# Patient Record
Sex: MALE | Race: WHITE | NOT HISPANIC OR LATINO | Employment: OTHER | URBAN - METROPOLITAN AREA
[De-identification: names, ages, dates, MRNs, and addresses within clinical notes are randomized per-mention and may not be internally consistent; named-entity substitution may affect disease eponyms.]

---

## 2021-08-11 ENCOUNTER — OFFICE VISIT (OUTPATIENT)
Dept: ENDOCRINOLOGY | Facility: CLINIC | Age: 66
End: 2021-08-11

## 2021-08-11 ENCOUNTER — LAB (OUTPATIENT)
Dept: LAB | Facility: HOSPITAL | Age: 66
End: 2021-08-11

## 2021-08-11 VITALS
DIASTOLIC BLOOD PRESSURE: 84 MMHG | OXYGEN SATURATION: 98 % | HEIGHT: 67 IN | BODY MASS INDEX: 33.9 KG/M2 | WEIGHT: 216 LBS | SYSTOLIC BLOOD PRESSURE: 132 MMHG | HEART RATE: 56 BPM

## 2021-08-11 DIAGNOSIS — E83.52 HYPERCALCEMIA: Primary | ICD-10-CM

## 2021-08-11 LAB
ALBUMIN SERPL-MCNC: 4.7 G/DL (ref 3.5–5.2)
ANION GAP SERPL CALCULATED.3IONS-SCNC: 10.8 MMOL/L (ref 5–15)
BUN SERPL-MCNC: 22 MG/DL (ref 8–23)
BUN/CREAT SERPL: 27.8 (ref 7–25)
CALCIUM SPEC-SCNC: 11 MG/DL (ref 8.6–10.5)
CHLORIDE SERPL-SCNC: 105 MMOL/L (ref 98–107)
CO2 SERPL-SCNC: 26.2 MMOL/L (ref 22–29)
CREAT SERPL-MCNC: 0.79 MG/DL (ref 0.76–1.27)
GFR SERPL CREATININE-BSD FRML MDRD: 98 ML/MIN/1.73
GLUCOSE SERPL-MCNC: 94 MG/DL (ref 65–99)
PHOSPHATE SERPL-MCNC: 2.7 MG/DL (ref 2.5–4.5)
POTASSIUM SERPL-SCNC: 5.2 MMOL/L (ref 3.5–5.2)
PTH-INTACT SERPL-MCNC: 45.8 PG/ML (ref 15–65)
SODIUM SERPL-SCNC: 142 MMOL/L (ref 136–145)

## 2021-08-11 PROCEDURE — 82652 VIT D 1 25-DIHYDROXY: CPT | Performed by: INTERNAL MEDICINE

## 2021-08-11 PROCEDURE — 83970 ASSAY OF PARATHORMONE: CPT | Performed by: INTERNAL MEDICINE

## 2021-08-11 PROCEDURE — 80069 RENAL FUNCTION PANEL: CPT | Performed by: INTERNAL MEDICINE

## 2021-08-11 PROCEDURE — 82306 VITAMIN D 25 HYDROXY: CPT | Performed by: INTERNAL MEDICINE

## 2021-08-11 PROCEDURE — 99203 OFFICE O/P NEW LOW 30 MIN: CPT | Performed by: INTERNAL MEDICINE

## 2021-08-11 RX ORDER — PHENOL 1.4 %
AEROSOL, SPRAY (ML) MUCOUS MEMBRANE
COMMUNITY

## 2021-08-11 RX ORDER — SODIUM PHOSPHATE,MONO-DIBASIC 19G-7G/118
ENEMA (ML) RECTAL
COMMUNITY
End: 2021-11-18 | Stop reason: HOSPADM

## 2021-08-11 RX ORDER — CHOLECALCIFEROL (VITAMIN D3) 125 MCG
4000 CAPSULE ORAL DAILY
COMMUNITY

## 2021-08-11 RX ORDER — CETIRIZINE HYDROCHLORIDE 10 MG/1
10 TABLET ORAL DAILY
COMMUNITY

## 2021-08-11 RX ORDER — LISINOPRIL 20 MG/1
20 TABLET ORAL NIGHTLY
COMMUNITY
Start: 2021-05-04

## 2021-08-11 NOTE — PROGRESS NOTES
Chief Complaint   Patient presents with   • Abnormal Lab        New patient who is being seen in consultation regarding hypercalcemia at the request of Mali Scanlon PA-C HPI   Chaz Chung is a 66 y.o. male who presents for evaluation of hypercalcemia.    Patient reports that diagnosis of elevated calcium was made earlier this year.  Patient denies taking calcium supplements or OTC medications containing calcium, such as TUMS.   Patient reports that he is taking vitamin D, 400 international units daily, started a couple of months ago.  Patient reports a history of hypertension but has never been treated with HCTZ or chlorthalidone.  Patient  denies history of kidney dysfunction.  Patient denies history of kidney stones.  Patient denies history of osteopenia or osteoporosis.  He has never had a bone density scan.  Patient denies family history of calcium disorders.  Patient reports reports a high volume of cheese intake.  He estimates that he eats 2 to 3 pounds of cheese per week.    Patient reports energy level is good, denies constipation, myalgias.  He does have some chronic knee pain.  He reports that he is diligent in maintaining hydration.  He denies any acute concerns.    Past Medical History:   Diagnosis Date   • Arthritis    • Hearing loss    • Hypertension      Past Surgical History:   Procedure Laterality Date   • APPENDECTOMY     • HERNIA REPAIR     • KNEE SURGERY     • SPINE SURGERY     • TONSILLECTOMY        Family History   Problem Relation Age of Onset   • Heart disease Mother    • Arthritis Mother    • Heart disease Father    • Heart disease Sister    • Asthma Sister    • No Known Problems Brother    • No Known Problems Sister       Social History     Socioeconomic History   • Marital status:      Spouse name: Not on file   • Number of children: Not on file   • Years of education: Not on file   • Highest education level: Not on file   Tobacco Use   • Smoking status: Never Smoker   •  "Smokeless tobacco: Former User     Types: Chew   Vaping Use   • Vaping Use: Never used   Substance and Sexual Activity   • Alcohol use: Yes     Alcohol/week: 5.0 standard drinks     Types: 4 Cans of beer, 1 Shots of liquor per week   • Drug use: Never   • Sexual activity: Defer      Allergies   Allergen Reactions   • Penicillins Hives      Current Outpatient Medications on File Prior to Visit   Medication Sig Dispense Refill   • cetirizine (zyrTEC) 10 MG tablet Take 10 mg by mouth Daily.     • cholecalciferol (VITAMIN D3) 10 MCG (400 UNIT) tablet Take 400 Units by mouth Daily.     • glucosamine sulfate 500 MG capsule capsule Take  by mouth 3 (Three) Times a Day With Meals.     • lisinopril (PRINIVIL,ZESTRIL) 20 MG tablet Take 20 mg by mouth Daily.     • Melatonin 10 MG tablet Take  by mouth.       No current facility-administered medications on file prior to visit.        Review of Systems   Constitutional: Negative for fatigue, unexpected weight gain and unexpected weight loss.   HENT: Negative for trouble swallowing and voice change.    Eyes: Negative for pain and visual disturbance.   Respiratory: Negative for cough and shortness of breath.    Cardiovascular: Negative for palpitations and leg swelling.   Gastrointestinal: Negative for constipation and diarrhea.   Endocrine: Negative for cold intolerance and heat intolerance.   Musculoskeletal: Positive for arthralgias. Negative for myalgias.   Skin: Negative for dry skin and rash.   Neurological: Negative for tremors and headache.   Psychiatric/Behavioral: Negative for sleep disturbance. The patient is not nervous/anxious.         Vitals:    08/11/21 0838   BP: 132/84   BP Location: Left arm   Patient Position: Sitting   Cuff Size: Adult   Pulse: 56   SpO2: 98%   Weight: 98 kg (216 lb)   Height: 170.2 cm (67\")   Body mass index is 33.83 kg/m².     Physical Exam  Vitals reviewed.   Constitutional:       General: He is not in acute distress.     Appearance: He is " obese.   HENT:      Head: Normocephalic and atraumatic.      Right Ear: Decreased hearing noted.      Left Ear: Decreased hearing noted.      Nose: Nose normal.   Eyes:      General: Lids are normal.      Conjunctiva/sclera: Conjunctivae normal.   Neck:      Thyroid: No thyromegaly or thyroid tenderness.   Cardiovascular:      Rate and Rhythm: Normal rate and regular rhythm.      Heart sounds: No murmur heard.     Pulmonary:      Effort: Pulmonary effort is normal.      Breath sounds: Normal breath sounds and air entry.   Abdominal:      General: Abdomen is protuberant. Bowel sounds are normal.      Palpations: Abdomen is soft.      Tenderness: There is no abdominal tenderness.   Lymphadenopathy:      Head:      Right side of head: No submandibular adenopathy.      Left side of head: No submandibular adenopathy.      Cervical: No cervical adenopathy.   Skin:     General: Skin is warm and dry.      Findings: No rash.   Neurological:      General: No focal deficit present.      Mental Status: He is alert.      Deep Tendon Reflexes: Reflexes are normal and symmetric.   Psychiatric:         Mood and Affect: Mood and affect normal.         Behavior: Behavior is cooperative.        Labs/Imaging  Labs dated 6/10/2020  eGFR 87  Calcium 10.6  Ionized calcium 6.1  TSH 2.57  PTH 41    Labs dated 5/4/2021  Calcium 9.5  Albumin 4.3    Labs dated 10/22/2020  Calcium 10.2  Albumin 4.4  25 hydroxy vitamin D 33.8    Assessment and Plan    Diagnoses and all orders for this visit:    1. Hypercalcemia (Primary)  -Patient initially noted to have mild hypercalcemia on labs in May 2021, repeat persistently elevated in June 2021 with concurrent PTH of 41.  -Vitamin D was normal in October 2020.  Discussed that patient's prior PTH level is inappropriately normal given mild hypercalcemia.  Discussed that this likely represents mild primary hyperparathyroidism.  Discussed evaluation required to confirm hyperparathyroidism.  We will plan to  repeat labs today to determine next steps.  -Reviewed surgical indications for primary hyperparathyroidism, patient has never had bone density evaluation and otherwise would not have an indication for surgical intervention.  Patient reports that he strongly prefers to avoid surgery unless absolutely indicated.  -Reviewed potential symptoms of hypercalcemia, patient not currently symptomatic.  Reviewed potential dangers of an acute rise in serum calcium including cardiac arrhythmias, mental status changes, renal dysfunction.  Discussed the role of hydration in preventing acute rise in serum calcium.  -     PTH, Intact  -     Renal Function Panel  -     Vitamin D 1,25 Dihydroxy  -     Vitamin D 25 Hydroxy      Return in about 4 months (around 12/11/2021). The patient was instructed to contact the clinic with any interval questions or concerns.    Margo Garg MD     Please note that portions of this document were completed using a voice recognition program. Efforts were made to edit the dictations, but occasionally words are mis-transcribed.

## 2021-08-12 LAB — 25(OH)D3 SERPL-MCNC: 48.3 NG/ML (ref 30–100)

## 2021-08-13 LAB — 1,25(OH)2D SERPL-MCNC: 45.5 PG/ML (ref 19.9–79.3)

## 2021-08-31 ENCOUNTER — PREP FOR SURGERY (OUTPATIENT)
Dept: OTHER | Facility: HOSPITAL | Age: 66
End: 2021-08-31

## 2021-08-31 ENCOUNTER — OFFICE VISIT (OUTPATIENT)
Dept: ORTHOPEDIC SURGERY | Facility: CLINIC | Age: 66
End: 2021-08-31

## 2021-08-31 VITALS
BODY MASS INDEX: 33.91 KG/M2 | SYSTOLIC BLOOD PRESSURE: 179 MMHG | WEIGHT: 216.05 LBS | HEART RATE: 65 BPM | HEIGHT: 67 IN | DIASTOLIC BLOOD PRESSURE: 80 MMHG

## 2021-08-31 DIAGNOSIS — M17.0 PRIMARY OSTEOARTHRITIS OF BOTH KNEES: Primary | ICD-10-CM

## 2021-08-31 DIAGNOSIS — M17.12 PRIMARY OSTEOARTHRITIS OF LEFT KNEE: Primary | ICD-10-CM

## 2021-08-31 DIAGNOSIS — M25.562 PAIN IN BOTH KNEES, UNSPECIFIED CHRONICITY: ICD-10-CM

## 2021-08-31 DIAGNOSIS — M25.561 PAIN IN BOTH KNEES, UNSPECIFIED CHRONICITY: ICD-10-CM

## 2021-08-31 PROBLEM — M17.9 OA (OSTEOARTHRITIS) OF KNEE: Status: ACTIVE | Noted: 2021-08-31

## 2021-08-31 PROCEDURE — 99204 OFFICE O/P NEW MOD 45 MIN: CPT | Performed by: ORTHOPAEDIC SURGERY

## 2021-08-31 RX ORDER — OXYCODONE HCL 10 MG/1
10 TABLET, FILM COATED, EXTENDED RELEASE ORAL ONCE
Status: CANCELLED | OUTPATIENT
Start: 2021-08-31 | End: 2021-08-31

## 2021-08-31 RX ORDER — TRANEXAMIC ACID 10 MG/ML
1000 INJECTION, SOLUTION INTRAVENOUS ONCE
Status: CANCELLED | OUTPATIENT
Start: 2021-08-31 | End: 2021-08-31

## 2021-08-31 RX ORDER — ACETAMINOPHEN 500 MG
1000 TABLET ORAL ONCE
Status: CANCELLED | OUTPATIENT
Start: 2021-08-31 | End: 2021-08-31

## 2021-08-31 RX ORDER — CHLORHEXIDINE GLUCONATE 4 G/100ML
SOLUTION TOPICAL DAILY
Qty: 236 ML | Refills: 0 | Status: ON HOLD | OUTPATIENT
Start: 2021-08-31 | End: 2021-11-17

## 2021-08-31 NOTE — PROGRESS NOTES
[facility] [place] [facility] Note        Subjective     Pain of the Left Knee and Pain of the Right Knee      HPI    Chaz Chung is a 66 y.o. male who presents with new problem of: bilateral knee pain.  Onset: atraumatic and gradual in nature. The issue has been ongoing for 8 year(s). Pain is a 5/10 on the pain scale. Pain is described as dull and aching. Associated symptoms include pain, swelling, grinding and giving way/buckling. The pain is worse with walking, standing, climbing stairs, working, leisure and rising from seated position; ice and pain medication and/or NSAID improve the pain. Previous treatments have included: bracing, NSAIDS, physical therapy and viscosupplementation (last injection January 2021 ).    I have reviewed the following portions of the patient's history:History of Present Illness and review of systems.      Patient is here today for evaluation of bilateral knee pain left greater than right.  This is bothered him for many years.  He had a left knee arthroscopy years ago by Dr. Rolo Nicholson in [place].  Patient has been reluctant to undergo arthroplasty because of a staph infection that occurred spontaneously in his spine (epidural abscess?).  Patient has tried injections and physical therapy and anti-inflammatories and bracing without a lot of long-term benefit.  He has significant pain anytime he is on his feet for long periods of time.    Past Medical History:   Diagnosis Date   • Arthritis    • Hearing loss    • Hypertension       Past Surgical History:   Procedure Laterality Date   • APPENDECTOMY     • HERNIA REPAIR     • KNEE SURGERY     • SPINE SURGERY     • TONSILLECTOMY        Family History   Problem Relation Age of Onset   • Heart disease Mother    • Arthritis Mother    • Heart disease Father    • Heart disease Sister    • Asthma Sister    • No Known Problems Brother    • No Known Problems Sister      Social History     Socioeconomic History   • Marital status:      " Spouse name: Not on file   • Number of children: Not on file   • Years of education: Not on file   • Highest education level: Not on file   Tobacco Use   • Smoking status: Never Smoker   • Smokeless tobacco: Former User     Types: Chew   Vaping Use   • Vaping Use: Never used   Substance and Sexual Activity   • Alcohol use: Yes     Alcohol/week: 5.0 standard drinks     Types: 4 Cans of beer, 1 Shots of liquor per week   • Drug use: Never   • Sexual activity: Defer      Current Outpatient Medications on File Prior to Visit   Medication Sig Dispense Refill   • cetirizine (zyrTEC) 10 MG tablet Take 10 mg by mouth Daily.     • cholecalciferol (VITAMIN D3) 10 MCG (400 UNIT) tablet Take 400 Units by mouth Daily.     • glucosamine sulfate 500 MG capsule capsule Take  by mouth 3 (Three) Times a Day With Meals.     • lisinopril (PRINIVIL,ZESTRIL) 20 MG tablet Take 20 mg by mouth Daily.     • Melatonin 10 MG tablet Take  by mouth.       No current facility-administered medications on file prior to visit.      Allergies   Allergen Reactions   • Penicillins Hives   • Sulfa Antibiotics Hives          Review of Systems   Constitutional: Negative.    HENT: Negative.    Eyes: Negative.    Respiratory: Negative.    Cardiovascular: Negative.    Gastrointestinal: Negative.    Endocrine: Negative.    Genitourinary: Negative.    Musculoskeletal: Positive for arthralgias.   Skin: Negative.    Allergic/Immunologic: Negative.    Neurological: Negative.    Hematological: Negative.    Psychiatric/Behavioral: Negative.         I reviewed the patient's chief complaint, history of present illness, review of systems, past medical history, surgical history, family history, social history, medications and allergy list.        Objective      Physical Exam  /80   Pulse 65   Ht 170.2 cm (67.01\")   Wt 98 kg (216 lb 0.8 oz)   BMI 33.83 kg/m²     Body mass index is 33.83 kg/m².    General  Mental Status - alert  General Appearance - " cooperative, well groomed, not in acute distress  Orientation - Oriented X3  Build & Nutrition - well developed and well nourished  Posture - normal posture  Gait - normal gait       Ortho Exam  Peripheral Vascular:    Upper Extremity:   Inspection:  Left--no cyanotic nail beds Right--no cyanotic nail beds   Bilateral:  Pink nail beds with brisk capillary refill   Palpation:  Bilateral radial pulse normal    Musculoskeletal:  Global Assessment:  Overall assessment of Lower Extremity Muscle Strength and Tone:  Left quadriceps--5/5   Left hamstrings--5/5       Left tibialis anterior--5/5  Left gastroc-soleus--5/5  Left EHL--5/5    Right quadriceps--5/5  Right hamstrings--5/5  Right tibialis anterior--5/5  Right gastroc soleus--5/5  Right EHL--5/5    Lower Extremity:  Knee/Patella:  No digital clubbing or cyanosis.    Examination of left and right knees reveals:  Normal deep tendon reflexes, coordination, strength, tone, sensation.  No known fractures or deformities.    Inspection and Palpation:    Left knee:  Tenderness:  Over the medial joint line and moderate severity  Effusion:  1+  Crepitus:  Positive  Pulses:  2+  Ecchymosis:  None  Warmth:  None     Right knee:  Tenderness:  Over the medial joint line and moderate severity  Effusion:  1+  Crepitus:  Positive  Pulses:  2+  Ecchymosis:  None  Warmth:  None     ROM:  Right:  Extension:5    Flexion:120  Left:  Extension:5     Flexion:120    Instability:    Left:  Lachman Test:  Negative, Varus stress test negative, Valgus stress test negative  Right:  Lachman Test:  Negative, Varus stress test negative, Valgus stress test negative    Deformities/Malalignments/Discrepancies:    Left:  Genu Varum   Right:  Genu Varum    Functional Testing:  Right:  Te's test:  Negative  Patella grind test:  Positive  Q-angle:  Normal    Left:  Te's test:  Negative  Patella grind test:  Positive  Q-angle:  normal    Imaging/Studies  Imaging Results (Last 24 Hours)      Procedure Component Value Units Date/Time    XR Knee 4+ View Bilateral [[id number]] Resulted: 08/31/21 1004     Updated: 08/31/21 1005    Narrative:      Right knee X-Ray    Indication: Pain    Study:  Upright AP, Skiers, Lateral, and Sunrise views of Right knee(s)    Comparison: None    Findings:    Patient appears to have severe hypertrophic degenerative changes in the   medial compartment.    There are mild early moderate degenerative changes in the lateral   compartment.    There are moderate changes in the patellofemoral compartment.    Patient has overall varus alignment.    Kellgren-Tino ndGndrndanddndend:nd nd2nd Impression:   Severe medial compartment and moderate patellofemoral compartment   degnerative changes of the knee           Left knee X-Ray    Indication: Pain    Study:  Upright AP, Skiers, Lateral, and Sunrise views of Left knee(s)    Comparison: None    Findings:    Patient appears to have severe hypertrophic degenerative changes in the   medial compartment.  There is mild subluxation of the femur on the tibia.  There are moderate to early severe degenerative changes in the lateral   compartment.    There are severe changes in the patellofemoral compartment.    Patient has overall varus alignment.    Kellgren-Tino thGthrthathdtheth:th th5th Impression:   Severe medial and patellofemoral compartment degnerative changes of the   knee.  Overall when compared to the right knee, changes are more   significant.            Assessment    Assessment:  1. Primary osteoarthritis of both knees    2. Pain in both knees, unspecified chronicity        Plan:  1. Continue over-the-counter medication as needed for discomfort  2. Bilateral knee arthritis left greater than right--patient would like to pursue arthroplasty on the left side.  We will do 1 knee at a time.  The risk, benefits, potential hazards of the surgery were discussed at length with him.  He has no personal history of DVT or first-degree relatives with DVT and  therefore we will use a full dose aspirin daily for 6 weeks.  He will do his physical therapy in [place].  We will likely need to do a 23-hour stay.  We stressed the importance of physical therapy.  3. Patient's deformity is fairly significant on the left side.  He is hypertrophic with regards to his arthritic pattern but is not clearly correctable.  He will need a fairly sizable medial dissection and possibly 5 degrees of external rotation on his cut.  I will see him back preoperatively and we will likely get a surgery done sometime in November provided no hospital shutdown secondary to Covid.        Connor Anderson MD  08/31/21  19:02 EDT    Dragon disclaimer:  Much of this encounter note is an electronic transcription/translation of spoken language to printed text. The electronic translation of spoken language may permit erroneous, or at times, nonsensical words or phrases to be inadvertently transcribed; Although I have reviewed the note for such errors, some may still exist.

## 2021-09-16 ENCOUNTER — LAB (OUTPATIENT)
Dept: ENDOCRINOLOGY | Facility: CLINIC | Age: 66
End: 2021-09-16

## 2021-09-16 ENCOUNTER — LAB (OUTPATIENT)
Dept: LAB | Facility: HOSPITAL | Age: 66
End: 2021-09-16

## 2021-09-16 DIAGNOSIS — E83.52 HYPERCALCEMIA: ICD-10-CM

## 2021-09-16 PROCEDURE — 82340 ASSAY OF CALCIUM IN URINE: CPT | Performed by: INTERNAL MEDICINE

## 2021-09-16 PROCEDURE — 81050 URINALYSIS VOLUME MEASURE: CPT | Performed by: INTERNAL MEDICINE

## 2021-09-17 LAB
CALCIUM 24H UR-MCNC: 15.8 MG/DL
CALCIUM 24H UR-MRATE: 347.6 MG/24 HR (ref 100–300)
COLLECT DURATION TIME UR: 24 HRS
SPECIMEN VOL 24H UR: 2200 ML

## 2021-11-11 ENCOUNTER — OFFICE VISIT (OUTPATIENT)
Dept: ORTHOPEDIC SURGERY | Facility: CLINIC | Age: 66
End: 2021-11-11

## 2021-11-11 ENCOUNTER — PRE-ADMISSION TESTING (OUTPATIENT)
Dept: PREADMISSION TESTING | Facility: HOSPITAL | Age: 66
End: 2021-11-11

## 2021-11-11 VITALS
DIASTOLIC BLOOD PRESSURE: 84 MMHG | BODY MASS INDEX: 32.8 KG/M2 | SYSTOLIC BLOOD PRESSURE: 138 MMHG | HEIGHT: 67 IN | WEIGHT: 209 LBS

## 2021-11-11 VITALS — BODY MASS INDEX: 33.49 KG/M2 | WEIGHT: 213.4 LBS | HEIGHT: 67 IN

## 2021-11-11 DIAGNOSIS — M17.12 PRIMARY OSTEOARTHRITIS OF LEFT KNEE: ICD-10-CM

## 2021-11-11 DIAGNOSIS — M17.0 PRIMARY OSTEOARTHRITIS OF BOTH KNEES: Primary | ICD-10-CM

## 2021-11-11 LAB
ANION GAP SERPL CALCULATED.3IONS-SCNC: 12 MMOL/L (ref 5–15)
BASOPHILS # BLD AUTO: 0.05 10*3/MM3 (ref 0–0.2)
BASOPHILS NFR BLD AUTO: 0.6 % (ref 0–1.5)
BUN SERPL-MCNC: 26 MG/DL (ref 8–23)
BUN/CREAT SERPL: 31 (ref 7–25)
CALCIUM SPEC-SCNC: 11.1 MG/DL (ref 8.6–10.5)
CHLORIDE SERPL-SCNC: 102 MMOL/L (ref 98–107)
CO2 SERPL-SCNC: 24 MMOL/L (ref 22–29)
CREAT SERPL-MCNC: 0.84 MG/DL (ref 0.76–1.27)
CRP SERPL-MCNC: 0.31 MG/DL (ref 0–0.5)
DEPRECATED RDW RBC AUTO: 42.4 FL (ref 37–54)
EOSINOPHIL # BLD AUTO: 0.33 10*3/MM3 (ref 0–0.4)
EOSINOPHIL NFR BLD AUTO: 3.9 % (ref 0.3–6.2)
ERYTHROCYTE [DISTWIDTH] IN BLOOD BY AUTOMATED COUNT: 13 % (ref 12.3–15.4)
ERYTHROCYTE [SEDIMENTATION RATE] IN BLOOD: 10 MM/HR (ref 0–20)
GFR SERPL CREATININE-BSD FRML MDRD: 91 ML/MIN/1.73
GLUCOSE SERPL-MCNC: 112 MG/DL (ref 65–99)
HBA1C MFR BLD: 5.6 % (ref 4.8–5.6)
HCT VFR BLD AUTO: 45.9 % (ref 37.5–51)
HGB BLD-MCNC: 15.2 G/DL (ref 13–17.7)
IMM GRANULOCYTES # BLD AUTO: 0.02 10*3/MM3 (ref 0–0.05)
IMM GRANULOCYTES NFR BLD AUTO: 0.2 % (ref 0–0.5)
LYMPHOCYTES # BLD AUTO: 1.11 10*3/MM3 (ref 0.7–3.1)
LYMPHOCYTES NFR BLD AUTO: 13.2 % (ref 19.6–45.3)
MCH RBC QN AUTO: 30.2 PG (ref 26.6–33)
MCHC RBC AUTO-ENTMCNC: 33.1 G/DL (ref 31.5–35.7)
MCV RBC AUTO: 91.1 FL (ref 79–97)
MONOCYTES # BLD AUTO: 0.47 10*3/MM3 (ref 0.1–0.9)
MONOCYTES NFR BLD AUTO: 5.6 % (ref 5–12)
NEUTROPHILS NFR BLD AUTO: 6.45 10*3/MM3 (ref 1.7–7)
NEUTROPHILS NFR BLD AUTO: 76.5 % (ref 42.7–76)
NRBC BLD AUTO-RTO: 0 /100 WBC (ref 0–0.2)
PLATELET # BLD AUTO: 234 10*3/MM3 (ref 140–450)
PMV BLD AUTO: 10 FL (ref 6–12)
POTASSIUM SERPL-SCNC: 5 MMOL/L (ref 3.5–5.2)
QT INTERVAL: 376 MS
QTC INTERVAL: 381 MS
RBC # BLD AUTO: 5.04 10*6/MM3 (ref 4.14–5.8)
SODIUM SERPL-SCNC: 138 MMOL/L (ref 136–145)
WBC # BLD AUTO: 8.43 10*3/MM3 (ref 3.4–10.8)

## 2021-11-11 PROCEDURE — 85652 RBC SED RATE AUTOMATED: CPT

## 2021-11-11 PROCEDURE — 93005 ELECTROCARDIOGRAM TRACING: CPT

## 2021-11-11 PROCEDURE — 36415 COLL VENOUS BLD VENIPUNCTURE: CPT

## 2021-11-11 PROCEDURE — 80048 BASIC METABOLIC PNL TOTAL CA: CPT

## 2021-11-11 PROCEDURE — 93010 ELECTROCARDIOGRAM REPORT: CPT | Performed by: INTERNAL MEDICINE

## 2021-11-11 PROCEDURE — 83036 HEMOGLOBIN GLYCOSYLATED A1C: CPT

## 2021-11-11 PROCEDURE — S0260 H&P FOR SURGERY: HCPCS | Performed by: ORTHOPAEDIC SURGERY

## 2021-11-11 PROCEDURE — 85025 COMPLETE CBC W/AUTO DIFF WBC: CPT

## 2021-11-11 PROCEDURE — 86140 C-REACTIVE PROTEIN: CPT

## 2021-11-11 ASSESSMENT — KOOS JR
KOOS JR SCORE: 44.905
KOOS JR SCORE: 17

## 2021-11-11 NOTE — H&P (VIEW-ONLY)
"    [facility] Note        Subjective     CC: Follow-up (Primary osteoarthritis of both knees)      HPI    Ashok Chung is a 66 y.o. male.  Patient here today for his preop appointment for left total knee arthroplasty.  Patient is with his wife today.    Overall, patient's symptoms are unchanged from 8/31/2021.  Please see note from 8/31/2021 for complete history.  Patient does have history of a cervical spine infection that occurred spontaneously that has made him a little bit leery.    ROS:    Constiutional:Pt denies fever, chills, nausea, or vomiting.  MSK:as above        Objective      Past Medical History  Past Medical History:   Diagnosis Date   • Arthritis    • Elevated cholesterol    • Hearing loss    • History of COVID-19 02/2021    blood test showed positive antibodies in Feb 2021 (no symptoms)   • History of staph infection 2004    Lost two vertebrae as a result of staph infection   • Hypercalcemia    • Hypertension    • Wears glasses          Physical Exam  /84   Ht 170.2 cm (67.01\")   Wt 94.8 kg (209 lb)   BMI 32.73 kg/m²     Body mass index is 32.73 kg/m².    Patient is well nourished and well developed.        Ortho Exam      Musculoskeletal:  Global Assessment:  Overall assessment of Lower Extremity Muscle Strength and Tone:  Left quadriceps--5/5   Left hamstrings--5/5       Left tibialis anterior--5/5  Left gastroc-soleus--5/5  Left EHL --5/5    Lower Extremity:    Inspection and Palpation:  Left knee:  Tenderness:  Over the medial joint line and moderate severity  Effusion:  1+  Crepitus:  Positive  Pulses:  2+  Ecchymosis:  None  Warmth:  None     ROM:  Left:  Extension: 5     Flexion:120    Instability:    Left:    Lachman Test:  Negative   Varus stress test negative  Valgus stress test negative    Deformities/Malalignments/Discrepancies:    Left:  Genu Varum     Functional Testing:  Te's test:  Negative  Patella grind test:  Positive  Q-angle:  " normal      Imaging/Labs/EMG Reviewed:  Imaging Results (Last 24 Hours)     ** No results found for the last 24 hours. **        We reviewed his laboratory studies from today at the time of this dictation.  Patient's sed rate and CRP are normal.  Hemoglobin A1c is within normal limits.  Hematocrit and platelet count are normal.  Creatinine is normal.    Assessment    Assessment:  1. Primary osteoarthritis of both knees        Plan:  1. Recommend over the counter anti-inflammatories for pain and/or swelling  2. Severe osteoarthritis bilateral knees left greater than right--we had a lengthy discussion with the patient regarding treatment options and alternatives.  At this point, we will continue to plan on 1 to 2 weeks of home health followed by outpatient PT.  We will use [facility] in [place].  He will get a full dose aspirin for DVT prophylaxis for 6 weeks.  He will need vancomycin and will need the entire dose in prior to starting surgery.  We will do the surgery as a 23-hour admit.  Will need likely significant releases given the severity of his varus deformity.      Connor Anderson MD  11/11/21  13:04 EST      Dictated Utilizing Dragon Dictation.

## 2021-11-11 NOTE — PAT
An arrival time for procedure was not given during PAT visit. If patient had any questions or concerns about their arrival time, they were instructed to contact their surgeon/physician.  Additionally, if the patient referred to an arrival time that was acquired from their my chart account, patient was encouraged to verify that time with their surgeon/physician.  NO arrival times given in Pre Admission Testing Department.    Patient to apply Chlorhexadine wipes  to surgical area (as instructed) the night before procedure and the AM of procedure. Wipes provided.    Patient instructed to drink 20 ounces (or until full) of Gatorade and it needs to be completed 1 hour (for Main OR patients) or 2 hours (scheduled  section patients) before given arrival time for procedure (NO RED Gatorade)    Patient verbalized understanding.    Prescription for Bactroban and Chlorhexidine shower called into patient's pharmacy or [facility] pharmacy by patient's surgeon.  Reinforced with patient to  the prescription from applicable pharmacy if they haven't already.  Verbal and written instructions given regarding proper use of the Bactroban and Chlorhexidine to patient and/or famlily during PAT visit. Patient/family also instructed to complete checklist and return it to Pre-op on the day of surgery.  Patient and/or family verbalized understanding.    Per Anesthesia Request, patient instructed not to take their ACE/ARB medications on the AM of surgery.    Discussed with patient options for receiving total joint replacement education and assessed patient's ability and preference. Joint Replacement Guide given to patient during PAT visit since not received a copy within the last year. Encouraged patient/family to read guide thoroughly and notify PAT staff with any questions or concerns. Handout provided directing patient to links to watch online videos related to joint replacement surgery on the [facility] website. The handout  gives detailed instructions for joining an online joint replacement class through Zoom or phone conference offered on Thursdays. Patient agreed to participate by watching videos online. Patient verbalized understanding of instructions and to complete the online learning tool survey. Encouraged to share information with family and/or . An overview of the joint replacement education was provided during the visit including general perioperative instructions that are routine for all surgical patients (PAT PASS, wipes, directions to pre-op, etc.).    Clean catch urinalysis not indicated because patient denied recent urinary frequency, urinary urgency, burning or pain upon urination, or flank pain. No recent UTIs.    Patient is aware of COVID test appointment at [facility] on 11/15/21.

## 2021-11-11 NOTE — PROGRESS NOTES
"    [facility] [place] [facility] Note        Subjective     CC: Follow-up (Primary osteoarthritis of both knees)      HPI    Ashok Chung is a 66 y.o. male.  Patient here today for his preop appointment for left total knee arthroplasty.  Patient is with his wife today.    Overall, patient's symptoms are unchanged from 8/31/2021.  Please see note from 8/31/2021 for complete history.  Patient does have history of a cervical spine infection that occurred spontaneously that has made him a little bit leery.    ROS:    Constiutional:Pt denies fever, chills, nausea, or vomiting.  MSK:as above        Objective      Past Medical History  Past Medical History:   Diagnosis Date   • Arthritis    • Elevated cholesterol    • Hearing loss    • History of COVID-19 02/2021    blood test showed positive antibodies in Feb 2021 (no symptoms)   • History of staph infection 2004    Lost two vertebrae as a result of staph infection   • Hypercalcemia    • Hypertension    • Wears glasses          Physical Exam  /84   Ht 170.2 cm (67.01\")   Wt 94.8 kg (209 lb)   BMI 32.73 kg/m²     Body mass index is 32.73 kg/m².    Patient is well nourished and well developed.        Ortho Exam      Musculoskeletal:  Global Assessment:  Overall assessment of Lower Extremity Muscle Strength and Tone:  Left quadriceps--5/5   Left hamstrings--5/5       Left tibialis anterior--5/5  Left gastroc-soleus--5/5  Left EHL --5/5    Lower Extremity:    Inspection and Palpation:  Left knee:  Tenderness:  Over the medial joint line and moderate severity  Effusion:  1+  Crepitus:  Positive  Pulses:  2+  Ecchymosis:  None  Warmth:  None     ROM:  Left:  Extension: 5     Flexion:120    Instability:    Left:    Lachman Test:  Negative   Varus stress test negative  Valgus stress test negative    Deformities/Malalignments/Discrepancies:    Left:  Genu Varum     Functional Testing:  Te's test:  Negative  Patella grind test:  Positive  Q-angle:  " normal      Imaging/Labs/EMG Reviewed:  Imaging Results (Last 24 Hours)     ** No results found for the last 24 hours. **        We reviewed his laboratory studies from today at the time of this dictation.  Patient's sed rate and CRP are normal.  Hemoglobin A1c is within normal limits.  Hematocrit and platelet count are normal.  Creatinine is normal.    Assessment    Assessment:  1. Primary osteoarthritis of both knees        Plan:  1. Recommend over the counter anti-inflammatories for pain and/or swelling  2. Severe osteoarthritis bilateral knees left greater than right--we had a lengthy discussion with the patient regarding treatment options and alternatives.  At this point, we will continue to plan on 1 to 2 weeks of home health followed by outpatient PT.  We will use [facility] in [place].  He will get a full dose aspirin for DVT prophylaxis for 6 weeks.  He will need vancomycin and will need the entire dose in prior to starting surgery.  We will do the surgery as a 23-hour admit.  Will need likely significant releases given the severity of his varus deformity.      Connor Anderson MD  11/11/21  13:04 EST      Dictated Utilizing Dragon Dictation.

## 2021-11-12 DIAGNOSIS — M17.0 PRIMARY OSTEOARTHRITIS OF BOTH KNEES: Primary | ICD-10-CM

## 2021-11-15 ENCOUNTER — APPOINTMENT (OUTPATIENT)
Dept: PREADMISSION TESTING | Facility: HOSPITAL | Age: 66
End: 2021-11-15

## 2021-11-15 DIAGNOSIS — M17.0 PRIMARY OSTEOARTHRITIS OF BOTH KNEES: ICD-10-CM

## 2021-11-15 LAB — SARS-COV-2 RNA PNL SPEC NAA+PROBE: NOT DETECTED

## 2021-11-15 PROCEDURE — U0004 COV-19 TEST NON-CDC HGH THRU: HCPCS

## 2021-11-15 PROCEDURE — C9803 HOPD COVID-19 SPEC COLLECT: HCPCS

## 2021-11-15 PROCEDURE — U0005 INFEC AGEN DETEC AMPLI PROBE: HCPCS

## 2021-11-16 ENCOUNTER — ANESTHESIA EVENT (OUTPATIENT)
Dept: PERIOP | Facility: HOSPITAL | Age: 66
End: 2021-11-16

## 2021-11-16 RX ORDER — FAMOTIDINE 10 MG/ML
20 INJECTION, SOLUTION INTRAVENOUS ONCE
Status: CANCELLED | OUTPATIENT
Start: 2021-11-16 | End: 2021-11-16

## 2021-11-17 ENCOUNTER — ANESTHESIA EVENT CONVERTED (OUTPATIENT)
Dept: ANESTHESIOLOGY | Facility: HOSPITAL | Age: 66
End: 2021-11-17

## 2021-11-17 ENCOUNTER — ANESTHESIA (OUTPATIENT)
Dept: PERIOP | Facility: HOSPITAL | Age: 66
End: 2021-11-17

## 2021-11-17 ENCOUNTER — APPOINTMENT (OUTPATIENT)
Dept: GENERAL RADIOLOGY | Facility: HOSPITAL | Age: 66
End: 2021-11-17

## 2021-11-17 ENCOUNTER — HOSPITAL ENCOUNTER (OUTPATIENT)
Facility: HOSPITAL | Age: 66
Discharge: HOME | End: 2021-11-18
Attending: ORTHOPAEDIC SURGERY | Admitting: ORTHOPAEDIC SURGERY

## 2021-11-17 DIAGNOSIS — Z96.652 STATUS POST TOTAL LEFT KNEE REPLACEMENT: Primary | ICD-10-CM

## 2021-11-17 DIAGNOSIS — M17.12 PRIMARY OSTEOARTHRITIS OF LEFT KNEE: ICD-10-CM

## 2021-11-17 PROBLEM — I10 HYPERTENSION: Status: ACTIVE | Noted: 2021-11-17

## 2021-11-17 LAB — GLUCOSE BLDC GLUCOMTR-MCNC: 94 MG/DL (ref 70–130)

## 2021-11-17 PROCEDURE — 97161 PT EVAL LOW COMPLEX 20 MIN: CPT

## 2021-11-17 PROCEDURE — A9270 NON-COVERED ITEM OR SERVICE: HCPCS | Performed by: ANESTHESIOLOGY

## 2021-11-17 PROCEDURE — 63710000001 LISINOPRIL 20 MG TABLET: Performed by: NURSE PRACTITIONER

## 2021-11-17 PROCEDURE — A9270 NON-COVERED ITEM OR SERVICE: HCPCS | Performed by: NURSE PRACTITIONER

## 2021-11-17 PROCEDURE — C1776 JOINT DEVICE (IMPLANTABLE): HCPCS | Performed by: ORTHOPAEDIC SURGERY

## 2021-11-17 PROCEDURE — 27447 TOTAL KNEE ARTHROPLASTY: CPT | Performed by: ORTHOPAEDIC SURGERY

## 2021-11-17 PROCEDURE — 25010000002 DEXAMETHASONE PER 1 MG: Performed by: NURSE ANESTHETIST, CERTIFIED REGISTERED

## 2021-11-17 PROCEDURE — 63710000001 OXYCODONE-ACETAMINOPHEN 7.5-325 MG TABLET: Performed by: ORTHOPAEDIC SURGERY

## 2021-11-17 PROCEDURE — 97116 GAIT TRAINING THERAPY: CPT

## 2021-11-17 PROCEDURE — C1713 ANCHOR/SCREW BN/BN,TIS/BN: HCPCS | Performed by: ORTHOPAEDIC SURGERY

## 2021-11-17 PROCEDURE — 25010000002 ROPIVACAINE PER 1 MG: Performed by: ORTHOPAEDIC SURGERY

## 2021-11-17 PROCEDURE — 63710000001 OXYCODONE 10 MG TABLET EXTENDED-RELEASE 12 HOUR: Performed by: ORTHOPAEDIC SURGERY

## 2021-11-17 PROCEDURE — C1889 IMPLANT/INSERT DEVICE, NOC: HCPCS | Performed by: ORTHOPAEDIC SURGERY

## 2021-11-17 PROCEDURE — 25010000002 VANCOMYCIN 10 G RECONSTITUTED SOLUTION: Performed by: ORTHOPAEDIC SURGERY

## 2021-11-17 PROCEDURE — A9270 NON-COVERED ITEM OR SERVICE: HCPCS | Performed by: ORTHOPAEDIC SURGERY

## 2021-11-17 PROCEDURE — 63710000001 POVIDONE-IODINE 10 % SOLUTION 30 ML BOTTLE: Performed by: ORTHOPAEDIC SURGERY

## 2021-11-17 PROCEDURE — C1755 CATHETER, INTRASPINAL: HCPCS | Performed by: ORTHOPAEDIC SURGERY

## 2021-11-17 PROCEDURE — 82962 GLUCOSE BLOOD TEST: CPT

## 2021-11-17 PROCEDURE — 25010000002 ONDANSETRON PER 1 MG: Performed by: NURSE ANESTHETIST, CERTIFIED REGISTERED

## 2021-11-17 PROCEDURE — 25010000002 KETOROLAC TROMETHAMINE PER 15 MG: Performed by: ORTHOPAEDIC SURGERY

## 2021-11-17 PROCEDURE — 27447 TOTAL KNEE ARTHROPLASTY: CPT | Performed by: PHYSICIAN ASSISTANT

## 2021-11-17 PROCEDURE — 73560 X-RAY EXAM OF KNEE 1 OR 2: CPT

## 2021-11-17 PROCEDURE — 25010000002 PROPOFOL 10 MG/ML EMULSION: Performed by: NURSE ANESTHETIST, CERTIFIED REGISTERED

## 2021-11-17 PROCEDURE — 63710000001 FAMOTIDINE 20 MG TABLET: Performed by: ANESTHESIOLOGY

## 2021-11-17 PROCEDURE — 25010000002 MORPHINE PER 10 MG: Performed by: ORTHOPAEDIC SURGERY

## 2021-11-17 PROCEDURE — 63710000001 ACETAMINOPHEN 500 MG TABLET: Performed by: ORTHOPAEDIC SURGERY

## 2021-11-17 PROCEDURE — 25010000002 SODIUM CHLORIDE 0.9 % WITH KCL 20 MEQ 20-0.9 MEQ/L-% SOLUTION: Performed by: ORTHOPAEDIC SURGERY

## 2021-11-17 PROCEDURE — 25010000002 ROPIVACINE HCL-NACL: Performed by: NURSE ANESTHETIST, CERTIFIED REGISTERED

## 2021-11-17 DEVICE — CAP TOTL KN CMT PRIMARY: Type: IMPLANTABLE DEVICE | Site: KNEE | Status: FUNCTIONAL

## 2021-11-17 DEVICE — COMP FEM/KN PERSONA CR CMT COCR STD SZ8 LT: Type: IMPLANTABLE DEVICE | Site: KNEE | Status: FUNCTIONAL

## 2021-11-17 DEVICE — CMT BONE R 1X40: Type: IMPLANTABLE DEVICE | Site: KNEE | Status: FUNCTIONAL

## 2021-11-17 DEVICE — IMPLANTABLE DEVICE
Type: IMPLANTABLE DEVICE | Site: KNEE | Status: FUNCTIONAL
Brand: PERSONA® NATURAL TIBIA®

## 2021-11-17 DEVICE — IMPLANTABLE DEVICE
Type: IMPLANTABLE DEVICE | Site: KNEE | Status: FUNCTIONAL
Brand: PERSONA® VIVACIT-E®

## 2021-11-17 DEVICE — DEV CONTRL TISS STRATAFIX SYMM PDS PLUS VIL CT-1 45CM: Type: IMPLANTABLE DEVICE | Site: KNEE | Status: FUNCTIONAL

## 2021-11-17 DEVICE — IMPLANTABLE DEVICE
Type: IMPLANTABLE DEVICE | Site: KNEE | Status: FUNCTIONAL
Brand: PERSONA™

## 2021-11-17 RX ORDER — LABETALOL HYDROCHLORIDE 5 MG/ML
10 INJECTION, SOLUTION INTRAVENOUS EVERY 4 HOURS PRN
Status: DISCONTINUED | OUTPATIENT
Start: 2021-11-17 | End: 2021-11-18 | Stop reason: HOSPADM

## 2021-11-17 RX ORDER — FAMOTIDINE 20 MG/1
20 TABLET, FILM COATED ORAL ONCE
Status: COMPLETED | OUTPATIENT
Start: 2021-11-17 | End: 2021-11-17

## 2021-11-17 RX ORDER — BUPIVACAINE HYDROCHLORIDE 2.5 MG/ML
INJECTION, SOLUTION EPIDURAL; INFILTRATION; INTRACAUDAL
Status: COMPLETED | OUTPATIENT
Start: 2021-11-17 | End: 2021-11-17

## 2021-11-17 RX ORDER — ONDANSETRON 2 MG/ML
INJECTION INTRAMUSCULAR; INTRAVENOUS AS NEEDED
Status: DISCONTINUED | OUTPATIENT
Start: 2021-11-17 | End: 2021-11-17 | Stop reason: SURG

## 2021-11-17 RX ORDER — OXYCODONE AND ACETAMINOPHEN 7.5; 325 MG/1; MG/1
1 TABLET ORAL EVERY 4 HOURS PRN
Status: DISCONTINUED | OUTPATIENT
Start: 2021-11-17 | End: 2021-11-18 | Stop reason: HOSPADM

## 2021-11-17 RX ORDER — LISINOPRIL 20 MG/1
20 TABLET ORAL NIGHTLY
Status: DISCONTINUED | OUTPATIENT
Start: 2021-11-17 | End: 2021-11-18 | Stop reason: HOSPADM

## 2021-11-17 RX ORDER — LIDOCAINE HYDROCHLORIDE 10 MG/ML
0.5 INJECTION, SOLUTION EPIDURAL; INFILTRATION; INTRACAUDAL; PERINEURAL ONCE AS NEEDED
Status: COMPLETED | OUTPATIENT
Start: 2021-11-17 | End: 2021-11-17

## 2021-11-17 RX ORDER — PROPOFOL 10 MG/ML
VIAL (ML) INTRAVENOUS AS NEEDED
Status: DISCONTINUED | OUTPATIENT
Start: 2021-11-17 | End: 2021-11-17 | Stop reason: SURG

## 2021-11-17 RX ORDER — SODIUM CHLORIDE AND POTASSIUM CHLORIDE 150; 900 MG/100ML; MG/100ML
50 INJECTION, SOLUTION INTRAVENOUS CONTINUOUS
Status: DISCONTINUED | OUTPATIENT
Start: 2021-11-17 | End: 2021-11-18 | Stop reason: HOSPADM

## 2021-11-17 RX ORDER — SODIUM CHLORIDE 0.9 % (FLUSH) 0.9 %
3-10 SYRINGE (ML) INJECTION AS NEEDED
Status: DISCONTINUED | OUTPATIENT
Start: 2021-11-17 | End: 2021-11-18 | Stop reason: HOSPADM

## 2021-11-17 RX ORDER — SODIUM CHLORIDE 0.9 % (FLUSH) 0.9 %
3 SYRINGE (ML) INJECTION EVERY 12 HOURS SCHEDULED
Status: DISCONTINUED | OUTPATIENT
Start: 2021-11-17 | End: 2021-11-18 | Stop reason: HOSPADM

## 2021-11-17 RX ORDER — DEXAMETHASONE SODIUM PHOSPHATE 4 MG/ML
INJECTION, SOLUTION INTRA-ARTICULAR; INTRALESIONAL; INTRAMUSCULAR; INTRAVENOUS; SOFT TISSUE AS NEEDED
Status: DISCONTINUED | OUTPATIENT
Start: 2021-11-17 | End: 2021-11-17 | Stop reason: SURG

## 2021-11-17 RX ORDER — DROPERIDOL 2.5 MG/ML
0.62 INJECTION, SOLUTION INTRAMUSCULAR; INTRAVENOUS ONCE AS NEEDED
Status: DISCONTINUED | OUTPATIENT
Start: 2021-11-17 | End: 2021-11-17 | Stop reason: HOSPADM

## 2021-11-17 RX ORDER — HYDROMORPHONE HYDROCHLORIDE 1 MG/ML
0.5 INJECTION, SOLUTION INTRAMUSCULAR; INTRAVENOUS; SUBCUTANEOUS
Status: DISCONTINUED | OUTPATIENT
Start: 2021-11-17 | End: 2021-11-17 | Stop reason: HOSPADM

## 2021-11-17 RX ORDER — SODIUM CHLORIDE 0.9 % (FLUSH) 0.9 %
10 SYRINGE (ML) INJECTION EVERY 12 HOURS SCHEDULED
Status: DISCONTINUED | OUTPATIENT
Start: 2021-11-17 | End: 2021-11-17 | Stop reason: HOSPADM

## 2021-11-17 RX ORDER — NALOXONE HCL 0.4 MG/ML
0.4 VIAL (ML) INJECTION
Status: DISCONTINUED | OUTPATIENT
Start: 2021-11-17 | End: 2021-11-18 | Stop reason: HOSPADM

## 2021-11-17 RX ORDER — ACETAMINOPHEN 500 MG
1000 TABLET ORAL ONCE
Status: COMPLETED | OUTPATIENT
Start: 2021-11-17 | End: 2021-11-17

## 2021-11-17 RX ORDER — ONDANSETRON 2 MG/ML
4 INJECTION INTRAMUSCULAR; INTRAVENOUS EVERY 6 HOURS PRN
Status: DISCONTINUED | OUTPATIENT
Start: 2021-11-17 | End: 2021-11-17 | Stop reason: SDUPTHER

## 2021-11-17 RX ORDER — OXYCODONE HCL 10 MG/1
10 TABLET, FILM COATED, EXTENDED RELEASE ORAL ONCE
Status: COMPLETED | OUTPATIENT
Start: 2021-11-17 | End: 2021-11-17

## 2021-11-17 RX ORDER — SODIUM CHLORIDE, SODIUM LACTATE, POTASSIUM CHLORIDE, CALCIUM CHLORIDE 600; 310; 30; 20 MG/100ML; MG/100ML; MG/100ML; MG/100ML
9 INJECTION, SOLUTION INTRAVENOUS CONTINUOUS
Status: DISCONTINUED | OUTPATIENT
Start: 2021-11-17 | End: 2021-11-18 | Stop reason: HOSPADM

## 2021-11-17 RX ORDER — CHOLECALCIFEROL (VITAMIN D3) 125 MCG
10 CAPSULE ORAL NIGHTLY PRN
Status: DISCONTINUED | OUTPATIENT
Start: 2021-11-17 | End: 2021-11-18 | Stop reason: HOSPADM

## 2021-11-17 RX ORDER — MORPHINE SULFATE 4 MG/ML
4 INJECTION, SOLUTION INTRAMUSCULAR; INTRAVENOUS
Status: DISCONTINUED | OUTPATIENT
Start: 2021-11-17 | End: 2021-11-18 | Stop reason: HOSPADM

## 2021-11-17 RX ORDER — ONDANSETRON 2 MG/ML
4 INJECTION INTRAMUSCULAR; INTRAVENOUS EVERY 6 HOURS PRN
Status: DISCONTINUED | OUTPATIENT
Start: 2021-11-17 | End: 2021-11-18 | Stop reason: HOSPADM

## 2021-11-17 RX ORDER — MAGNESIUM HYDROXIDE 1200 MG/15ML
LIQUID ORAL AS NEEDED
Status: DISCONTINUED | OUTPATIENT
Start: 2021-11-17 | End: 2021-11-17 | Stop reason: HOSPADM

## 2021-11-17 RX ORDER — LIDOCAINE HYDROCHLORIDE 10 MG/ML
INJECTION, SOLUTION EPIDURAL; INFILTRATION; INTRACAUDAL; PERINEURAL AS NEEDED
Status: DISCONTINUED | OUTPATIENT
Start: 2021-11-17 | End: 2021-11-17 | Stop reason: SURG

## 2021-11-17 RX ORDER — MIDAZOLAM HYDROCHLORIDE 1 MG/ML
0.5 INJECTION INTRAMUSCULAR; INTRAVENOUS
Status: DISCONTINUED | OUTPATIENT
Start: 2021-11-17 | End: 2021-11-17 | Stop reason: HOSPADM

## 2021-11-17 RX ORDER — LABETALOL HYDROCHLORIDE 5 MG/ML
5 INJECTION, SOLUTION INTRAVENOUS
Status: DISCONTINUED | OUTPATIENT
Start: 2021-11-17 | End: 2021-11-17 | Stop reason: HOSPADM

## 2021-11-17 RX ORDER — FENTANYL CITRATE 50 UG/ML
50 INJECTION, SOLUTION INTRAMUSCULAR; INTRAVENOUS
Status: DISCONTINUED | OUTPATIENT
Start: 2021-11-17 | End: 2021-11-17 | Stop reason: HOSPADM

## 2021-11-17 RX ORDER — SODIUM CHLORIDE 0.9 % (FLUSH) 0.9 %
10 SYRINGE (ML) INJECTION AS NEEDED
Status: DISCONTINUED | OUTPATIENT
Start: 2021-11-17 | End: 2021-11-17 | Stop reason: HOSPADM

## 2021-11-17 RX ORDER — ONDANSETRON 4 MG/1
4 TABLET, FILM COATED ORAL EVERY 6 HOURS PRN
Status: DISCONTINUED | OUTPATIENT
Start: 2021-11-17 | End: 2021-11-18 | Stop reason: HOSPADM

## 2021-11-17 RX ORDER — ASPIRIN 325 MG
325 TABLET, DELAYED RELEASE (ENTERIC COATED) ORAL DAILY
Status: DISCONTINUED | OUTPATIENT
Start: 2021-11-18 | End: 2021-11-18 | Stop reason: HOSPADM

## 2021-11-17 RX ORDER — BUPIVACAINE HYDROCHLORIDE 5 MG/ML
INJECTION, SOLUTION PERINEURAL
Status: COMPLETED | OUTPATIENT
Start: 2021-11-17 | End: 2021-11-17

## 2021-11-17 RX ADMIN — ONDANSETRON 4 MG: 2 INJECTION INTRAMUSCULAR; INTRAVENOUS at 12:42

## 2021-11-17 RX ADMIN — POTASSIUM CHLORIDE AND SODIUM CHLORIDE 50 ML/HR: 900; 150 INJECTION, SOLUTION INTRAVENOUS at 16:19

## 2021-11-17 RX ADMIN — ACETAMINOPHEN 1000 MG: 500 TABLET ORAL at 08:24

## 2021-11-17 RX ADMIN — OXYCODONE HYDROCHLORIDE 10 MG: 10 TABLET, FILM COATED, EXTENDED RELEASE ORAL at 08:24

## 2021-11-17 RX ADMIN — SODIUM CHLORIDE, POTASSIUM CHLORIDE, SODIUM LACTATE AND CALCIUM CHLORIDE: 600; 310; 30; 20 INJECTION, SOLUTION INTRAVENOUS at 13:11

## 2021-11-17 RX ADMIN — OXYCODONE HYDROCHLORIDE AND ACETAMINOPHEN 1 TABLET: 7.5; 325 TABLET ORAL at 22:54

## 2021-11-17 RX ADMIN — SODIUM CHLORIDE, PRESERVATIVE FREE 3 ML: 5 INJECTION INTRAVENOUS at 22:00

## 2021-11-17 RX ADMIN — DEXAMETHASONE SODIUM PHOSPHATE 8 MG: 4 INJECTION, SOLUTION INTRA-ARTICULAR; INTRALESIONAL; INTRAMUSCULAR; INTRAVENOUS; SOFT TISSUE at 10:33

## 2021-11-17 RX ADMIN — PROPOFOL 50 MG: 10 INJECTION, EMULSION INTRAVENOUS at 10:18

## 2021-11-17 RX ADMIN — BUPIVACAINE HYDROCHLORIDE 30 ML: 2.5 INJECTION, SOLUTION EPIDURAL; INFILTRATION; INTRACAUDAL; PERINEURAL at 13:29

## 2021-11-17 RX ADMIN — LISINOPRIL 20 MG: 20 TABLET ORAL at 21:58

## 2021-11-17 RX ADMIN — OXYCODONE HYDROCHLORIDE AND ACETAMINOPHEN 1 TABLET: 7.5; 325 TABLET ORAL at 17:07

## 2021-11-17 RX ADMIN — ROPIVACAINE HYDROCHLORIDE 10 ML/HR: 2 INJECTION, SOLUTION EPIDURAL; INFILTRATION at 13:32

## 2021-11-17 RX ADMIN — LIDOCAINE HYDROCHLORIDE 50 MG: 10 INJECTION, SOLUTION EPIDURAL; INFILTRATION; INTRACAUDAL; PERINEURAL at 10:18

## 2021-11-17 RX ADMIN — FAMOTIDINE 20 MG: 20 TABLET ORAL at 08:24

## 2021-11-17 RX ADMIN — PROPOFOL 85 MCG/KG/MIN: 10 INJECTION, EMULSION INTRAVENOUS at 10:22

## 2021-11-17 RX ADMIN — VANCOMYCIN HYDROCHLORIDE 1500 MG: 10 INJECTION, POWDER, LYOPHILIZED, FOR SOLUTION INTRAVENOUS at 08:25

## 2021-11-17 RX ADMIN — BUPIVACAINE HYDROCHLORIDE 2 ML: 5 INJECTION, SOLUTION PERINEURAL at 10:19

## 2021-11-17 RX ADMIN — VANCOMYCIN HYDROCHLORIDE 1500 MG: 10 INJECTION, POWDER, LYOPHILIZED, FOR SOLUTION INTRAVENOUS at 21:57

## 2021-11-17 RX ADMIN — PROPOFOL 20 MG: 10 INJECTION, EMULSION INTRAVENOUS at 10:21

## 2021-11-17 RX ADMIN — LIDOCAINE HYDROCHLORIDE 0.5 ML: 10 INJECTION, SOLUTION EPIDURAL; INFILTRATION; INTRACAUDAL; PERINEURAL at 08:24

## 2021-11-17 RX ADMIN — SODIUM CHLORIDE, POTASSIUM CHLORIDE, SODIUM LACTATE AND CALCIUM CHLORIDE 9 ML/HR: 600; 310; 30; 20 INJECTION, SOLUTION INTRAVENOUS at 08:30

## 2021-11-17 NOTE — ANESTHESIA POSTPROCEDURE EVALUATION
Patient: Ashok Chung    Procedure Summary     Date: 11/17/21 [facility]  [facility]    Anesthesia Start: 1013 Anesthesia Stop:     Procedure: LEFT TOTAL KNEE ARTHROPLASTY (Left Knee) Diagnosis:       Primary osteoarthritis of left knee      (Primary osteoarthritis of left knee [M17.12])    Surgeons: Connor Anderson MD Provider: Mikael Carrasco MD    Anesthesia Type: MAC, spinal ASA Status: 2          Anesthesia Type: MAC, spinal    Vitals  Vitals Value Taken Time   /67 11/17/21 1325   Temp 97 °F (36.1 °C) 11/17/21 1321   Pulse 59 11/17/21 1330   Resp 16 11/17/21 1321   SpO2 99 % 11/17/21 1330   Vitals shown include unvalidated device data.        Post Anesthesia Care and Evaluation    Patient location during evaluation: PACU  Patient participation: complete - patient participated  Level of consciousness: awake and alert  Pain score: 0  Pain management: adequate  Airway patency: patent  Anesthetic complications: No anesthetic complications  PONV Status: none  Cardiovascular status: hemodynamically stable and acceptable  Respiratory status: nonlabored ventilation, acceptable and nasal cannula  Hydration status: acceptable    Comments: Pt transferred to PACU with O2. Vital signs stable. Report to PACU RN and care accepted.

## 2021-11-17 NOTE — THERAPY EVALUATION
Patient Name: Ashok Chung  : 1955    MRN: [id number]                              Today's Date: 2021       Admit Date: 2021    Visit Dx:     ICD-10-CM ICD-9-CM   1. Primary osteoarthritis of left knee  M17.12 715.16     Patient Active Problem List   Diagnosis   • OA (osteoarthritis) of knee   • Status post total left knee replacement   • Hypertension     Past Medical History:   Diagnosis Date   • Arthritis    • Elevated cholesterol    • Hearing loss    • History of COVID-19 2021    blood test showed positive antibodies in 2021 (no symptoms)   • History of staph infection 2004    Lost two vertebrae as a result of staph infection   • Hypercalcemia    • Hypertension    • Wears glasses      Past Surgical History:   Procedure Laterality Date   • APPENDECTOMY     • COLONOSCOPY     • HERNIA REPAIR     • KNEE ARTHROSCOPY Left    • KNEE SURGERY     • SPINE SURGERY     • TONSILLECTOMY        General Information     Row Name 21 1555          Physical Therapy Time and Intention    Document Type evaluation  -TATIANA     Mode of Treatment individual therapy; physical therapy  -TATIANA     Row Name 21 1556          General Information    Patient Profile Reviewed yes  -TATIANA     Prior Level of Function min assist:; all household mobility; transfer; bed mobility; ADL's  -TATIANA     Existing Precautions/Restrictions fall; other (see comments)  L adductor nerve cath  -TATIANA     Barriers to Rehab none identified  -TATIANA     Row Name 21 1557          Living Environment    Lives With spouse  -TATIANA     Row Name 21 1558          Home Main Entrance    Number of Stairs, Main Entrance three  -TATIANA     Stair Railings, Main Entrance none  -TATIANA     Row Name 21 1554          Stairs Within Home, Primary    Stairs, Within Home, Primary 0  -TATIANA     Number of Stairs, Within Home, Primary none  -TATIANA     Row Name 21 1558          Cognition    Orientation Status (Cognition) oriented x 4  -TATIANA     Row Name 21  1555          Safety Issues, Functional Mobility    Safety Issues Affecting Function (Mobility) safety precaution awareness; safety precautions follow-through/compliance  -     Impairments Affecting Function (Mobility) endurance/activity tolerance; strength; pain; range of motion (ROM)  -TATIANA           User Key  (r) = Recorded By, (t) = Taken By, (c) = Cosigned By    Initials Name Provider Type    TATIANA Ector Hernandez, WILLIAM Physical Therapist               Mobility     Row Name 11/17/21 1555          Bed Mobility    Bed Mobility scooting/bridging; supine-sit; sit-supine  -TATIANA     Scooting/Bridging [place] (Bed Mobility) supervision; verbal cues  -TATIANA     Supine-Sit [place] (Bed Mobility) supervision; verbal cues  -TATIANA     Sit-Supine [place] (Bed Mobility) supervision; verbal cues  -TATIANA     Assistive Device (Bed Mobility) bed rails; head of bed elevated  -     Comment (Bed Mobility) Verbal cues for LE sequencing off of/onto EOB and trunk control into sitting/supine  -     Row Name 11/17/21 1555          Transfers    Comment (Transfers) Verbal cues for safe hand placement during standing/sitting and moving L LE out for comfort prior to sitting  -     Row Name 11/17/21 1555          Sit-Stand Transfer    Sit-Stand [place] (Transfers) verbal cues; contact guard  -     Assistive Device (Sit-Stand Transfers) walker, front-wheeled  -     Row Name 11/17/21 1552          Gait/Stairs (Locomotion)    [place] Level (Gait) verbal cues; contact guard; 1 person to manage equipment  -     Assistive Device (Gait) walker, front-wheeled  -     Distance in Feet (Gait) 250  -TATIANA     Deviations/Abnormal Patterns (Gait) bilateral deviations; leisa decreased; gait speed decreased; stride length decreased  -     Bilateral Gait Deviations forward flexed posture  -     Left Sided Gait Deviations heel strike decreased; weight shift ability decreased  -     [place] Level (Stairs) not tested  -     Comment  (Gait/Stairs) Pt ambulated with step through pattern and decreased speed. Verbal cues for maintaining upright posture, body within walker, increase step length, and WB through LEs. Gait limited by fatigue and weakness. No knee buckling noted.  -TATIANA     Row Name 11/17/21 1555          Mobility    Extremity Weight-bearing Status left lower extremity  -     Left Lower Extremity (Weight-bearing Status) weight-bearing as tolerated (WBAT)  -           User Key  (r) = Recorded By, (t) = Taken By, (c) = Cosigned By    Initials Name Provider Type    Ector Rice, PT Physical Therapist               Obj/Interventions     Row Name 11/17/21 1555          Range of Motion Comprehensive    General Range of Motion lower extremity range of motion deficits identified  -     Comment, General Range of Motion R LE AROM WFL; L LE AROM impaired 25%; able to actively DF/PF  -TATIANA     Row Name 11/17/21 1555          Strength Comprehensive (MMT)    General Manual Muscle Testing (MMT) Assessment lower extremity strength deficits identified  -     Comment, General Manual Muscle Testing (MMT) Assessment R LE functionally 4+/5; L LE functionally 4-/5; IND with SLR  -TATIANA     Row Name 11/17/21 1555          Motor Skills    Therapeutic Exercise hip; ankle; knee  -TATIANA     Row Name 11/17/21 [facility]          Hip (Therapeutic Exercise)    Hip (Therapeutic Exercise) isometric exercises  -     Hip Isometrics (Therapeutic Exercise) gluteal sets; 10 repetitions  -TATIANA     Row Name 11/17/21 1555          Knee (Therapeutic Exercise)    Knee (Therapeutic Exercise) isometric exercises  -     Knee Isometrics (Therapeutic Exercise) quad sets; 10 repetitions  -TATIANA     Row Name 11/17/21 [facility]          Ankle (Therapeutic Exercise)    Ankle (Therapeutic Exercise) AROM (active range of motion)  -     Ankle AROM (Therapeutic Exercise) bilateral; dorsiflexion; plantarflexion; 10 repetitions  -TATIANA     Row Name 11/17/21 1555          Sensory Assessment (Somatosensory)     Sensory Assessment (Somatosensory) LE sensation intact  -TATIANA           User Key  (r) = Recorded By, (t) = Taken By, (c) = Cosigned By    Initials Name Provider Type    Ector Rice, PT Physical Therapist               Goals/Plan     Row Name 11/17/21 1555          Bed Mobility Goal 1 (PT)    Activity/Assistive Device (Bed Mobility Goal 1, PT) sit to supine/supine to sit  -TATIANA     [place] Level/Cues Needed (Bed Mobility Goal 1, PT) modified independence  -TATIANA     Time Frame (Bed Mobility Goal 1, PT) long term goal (LTG); 3 days  -TATIANA     Row Name 11/17/21 1555          Transfer Goal 1 (PT)    Activity/Assistive Device (Transfer Goal 1, PT) sit-to-stand/stand-to-sit; walker, rolling  -TATIANA     [place] Level/Cues Needed (Transfer Goal 1, PT) modified independence  -TATIANA     Time Frame (Transfer Goal 1, PT) long term goal (LTG); 3 days  -TATIANA     Row Name 11/17/21 1555          Gait Training Goal 1 (PT)    Activity/Assistive Device (Gait Training Goal 1, PT) gait (walking locomotion); walker, rolling  -TATIANA     [place] Level (Gait Training Goal 1, PT) modified independence  -TATIANA     Distance (Gait Training Goal 1, PT) 300 feet  -TATIANA     Time Frame (Gait Training Goal 1, PT) long term goal (LTG); 3 days  -TATIANA     [facility] Name 11/17/21 1555          ROM Goal 1 (PT)    ROM Goal 1 (PT) L knee AROM 0-90 degrees  -TATIANA     Time Frame (ROM Goal 1, PT) long-term goal (LTG); 3 days  -TATIANA     Row Name 11/17/21 1555          Stairs Goal 1 (PT)    Activity/Assistive Device (Stairs Goal 1, PT) stairs, all skills; cane, straight  -TATIANA     [place] Level/Cues Needed (Stairs Goal 1, PT) contact guard assist  -TATIANA     Number of Stairs (Stairs Goal 1, PT) 3  -TATIANA     Time Frame (Stairs Goal 1, PT) long term goal (LTG); 3 days  -TATIANA           User Key  (r) = Recorded By, (t) = Taken By, (c) = Cosigned By    Initials Name Provider Type    Ector Rice, PT Physical Therapist               Clinical Impression     Row Name 11/17/21 6650           Pain    Additional Documentation Pain Scale: Numbers Pre/Post-Treatment (Group)  -TATIANA     Row Name 11/17/21 4283          Pain Scale: Numbers Pre/Post-Treatment    Pretreatment Pain Rating 0/10 - no pain  -TATIANA     Posttreatment Pain Rating 4/10  -TATIANA     Pain Location - Side Left  -TATIANA     Pain Location - Orientation anterior  -TATIANA     Pain Location knee  -TATIANA     Pain Intervention(s) Ambulation/increased activity; Repositioned  -TATIANA     Row Name 11/17/21 6797          Therapy Assessment/Plan (PT)    Patient/Family Therapy Goals Statement (PT) To return home  -TATIANA     Rehab Potential (PT) good, to achieve stated therapy goals  -TATIANA     Criteria for Skilled Interventions Met (PT) yes; meets criteria; skilled treatment is necessary  -TATIANA     Row Name 11/17/21 8540          Positioning and Restraints    Pre-Treatment Position in bed  -TATIANA     Post Treatment Position bed  -TATIANA     In Bed notified nsg; fowlers; call light within reach; encouraged to call for assist; exit alarm on; with nsg  -TATIANA           User Key  (r) = Recorded By, (t) = Taken By, (c) = Cosigned By    Initials Name Provider Type    Ector Rice, PT Physical Therapist               Outcome Measures     Row Name 11/17/21 1247          How much help from another person do you currently need...    Turning from your back to your side while in flat bed without using bedrails? 4  -TATIANA     Moving from lying on back to sitting on the side of a flat bed without bedrails? 4  -TATIANA     Moving to and from a bed to a chair (including a wheelchair)? 3  -TATIANA     Standing up from a chair using your arms (e.g., wheelchair, bedside chair)? 3  -TATIANA     Climbing 3-5 steps with a railing? 3  -TATIANA     To walk in hospital room? 3  -TATIANA     AM-PAC 6 Clicks Score (PT) 20  -TATIANA     Row Name 11/17/21 9454          PADD    Diagnosis 1  -TATIANA     Gender 2  -TATIANA     Age Group 1  -TATIANA     Gait Distance 1  -TATIANA     Assist Level 1  -TATIANA     Home Support 3  -TATIANA     PADD Score 9  -TATIANA     Patient Preference  home with home health  -     Prediction by PADD Score directly home (with home health or out-patient rehab)  -     Row Name 11/17/21 2068          Functional Assessment    Outcome Measure Options AM-PAC 6 Clicks Basic Mobility (PT); PADD  -           User Key  (r) = Recorded By, (t) = Taken By, (c) = Cosigned By    Initials Name Provider Type    TATIANA Ector Hernandez, PT Physical Therapist                             Physical Therapy Education                 Title: PT OT SLP Therapies (Done)     Topic: Physical Therapy (Done)     Point: Mobility training (Done)     Learning Progress Summary           Patient Acceptance, D,E, VU by TATIANA at 11/17/2021 1555    Comment: Educated on safe sequencing with bed mobility, ambulatory transfers, gait. Reviewed HEP and knee precautions.                   Point: Home exercise program (Done)     Learning Progress Summary           Patient Acceptance, D,E, VU by TATIANA at 11/17/2021 1555    Comment: Educated on safe sequencing with bed mobility, ambulatory transfers, gait. Reviewed HEP and knee precautions.                   Point: Body mechanics (Done)     Learning Progress Summary           Patient Acceptance, D,E, VU by TATIANA at 11/17/2021 1555    Comment: Educated on safe sequencing with bed mobility, ambulatory transfers, gait. Reviewed HEP and knee precautions.                   Point: Precautions (Done)     Learning Progress Summary           Patient Acceptance, D,E, VU by TATIANA at 11/17/2021 1555    Comment: Educated on safe sequencing with bed mobility, ambulatory transfers, gait. Reviewed HEP and knee precautions.                               User Key     Initials Effective Dates Name Provider Type Discipline     06/16/21 -  Ector Hernandez, PT Physical Therapist PT              PT Recommendation and Plan  Planned Therapy Interventions (PT): balance training, bed mobility training, gait training, home exercise program, patient/family education, transfer training, ROM (range of motion),  stair training, strengthening  Plan of Care Reviewed With: patient  Progress: improving  Outcome Summary: PT eval complete. Pt ambulated 250 feet using RW, CGA, and one person to manage equipment. Gait limited by fatigue and weakness. Bed mobility performed with supervision and STS with CGA. No knee buckling noted. Pt IND with SLR. Will assess L knee AROM POD#1. Reviewed HEP and knee precautions. PADD score = 9. Recommend pt d/c home with assist and HHPT when appropriate.     Time Calculation:    PT Charges     Row Name 11/17/21 1555             Time Calculation    Start Time 1555  -TATIANA      PT Received On 11/17/21  -TATIANA      PT Goal Re-Cert Due Date 11/27/21  -TATIANA              Time Calculation- PT    Total Timed Code Minutes- PT 10 minute(s)  -TATIANA              Timed Charges    38256 - PT Therapeutic Exercise Minutes 2  -TATIANA      19736 - Gait Training Minutes  8  -TATIANA              Untimed Charges    PT Eval/Re-eval Minutes 34  -TATIANA              Total Minutes    Timed Charges Total Minutes 10  -TATIANA      Untimed Charges Total Minutes 34  -TATIANA       Total Minutes 44  -TATIANA            User Key  (r) = Recorded By, (t) = Taken By, (c) = Cosigned By    Initials Name Provider Type    Ector Rice, PT Physical Therapist              Therapy Charges for Today     Code Description Service Date Service Provider Modifiers Qty    10051288460 HC GAIT TRAINING EA 15 MIN 11/17/2021 Ector Hernandez, PT GP 1    58419765830 HC PT EVAL LOW COMPLEXITY 3 11/17/2021 Ector Hernandez, PT GP 1    97095602680 HC PT THER SUPP EA 15 MIN 11/17/2021 Ector Hernandez, PT GP 3          PT G-Codes  Outcome Measure Options: AM-PAC 6 Clicks Basic Mobility (PT), PADD  AM-PAC 6 Clicks Score (PT): 20    Ector Hernandez PT  11/17/2021

## 2021-11-17 NOTE — ANESTHESIA PROCEDURE NOTES
Left Adductor Canal Catheter      Patient reassessed immediately prior to procedure    Patient location during procedure: post-op  Reason for block: at surgeon's request and post-op pain management  Performed by  CRNA: Amy Lund CRNA  Assisted by: Hetal Palmer RN  Preanesthetic Checklist  Completed: patient identified, IV checked, site marked, risks and benefits discussed, surgical consent, monitors and equipment checked, pre-op evaluation and timeout performed  Prep:  Pt Position: supine  Sterile barriers:cap, gloves, mask and sterile barriers  Prep: ChloraPrep  Patient monitoring: blood pressure monitoring, continuous pulse oximetry and EKG  Procedure  Performed under: spinal  Guidance:ultrasound guided  Images:still images obtained, printed/placed on chart    Laterality:left  Block Type:adductor canal block  Injection Technique:catheter  Needle Type:Tuohy and echogenic  Needle Gauge:18 G  Resistance on Injection: none  Catheter Size:20 G (20g)  Cath Depth at skin: 9 cm    Medications Used: bupivacaine PF (MARCAINE) 0.25 % injection, 30 mL  Med administered at 11/17/2021 1:29 PM      Post Assessment  Injection Assessment: negative aspiration for heme, incremental injection and no paresthesia on injection  Patient Tolerance:comfortable throughout block  Complications:no  Additional Notes  Procedure:             The pt was placed in the Supine position.  The Insertion site was  prepped and Draped in sterile fashion.  The pt was anesthetized with  IV Sedation( see meds).  Skin and cutaneous tissue was infiltrated and anesthetized with 1% Lidocaine 3 mls via a 25g needle.  A BBraun 4 inch 18g echogenic needle was then  inserted approximately midline, mid-thigh and advanced In-plane with Ultrasound guidance.  Normal Saline PSF was utilized for hydrodissection of tissue.  The Vastus medialis and Sartorius muscle where visualized and the needle tip was placed in the adductor canal,  lateral to the femoral  artery.  LA injection spread was visualized, injection was incremental 1-5ml, injection pressure was normal or little, no intraneural injection, no vascular injection.  LA dose was injected thru the needle(see dose above).  A BBraun 20g wire stylet catheter was placed via the needle with ultrasound visualization and confirmation with NS fluid bolus. The catheter insertion site was sealed with exofin tissue adhesive. The labeled catheter was then coiled and secured to skin with benzoin,  steristrips and CHG transparent dressing.  Appropriate labels were applied.  Thank you.

## 2021-11-17 NOTE — ANESTHESIA PROCEDURE NOTES
Spinal Block      Patient reassessed immediately prior to procedure    Patient location during procedure: OR  Indication:at surgeon's request  Performed By  CRNA: Amy Lund CRNA  Preanesthetic Checklist  Completed: patient identified, IV checked, site marked, risks and benefits discussed, surgical consent, monitors and equipment checked, pre-op evaluation and timeout performed  Spinal Block Prep:  Patient Position:sitting  Sterile Tech:cap, gloves, sterile barriers and mask  Prep:Chloraprep  Patient Monitoring:continuous pulse oximetry  Spinal Block Procedure  Approach:midline  Guidance:landmark technique and palpation technique  Location:L4-L5  Needle Type:Quincke  Needle Gauge:22 G  Placement of Spinal needle event:cerebrospinal fluid aspirated  Paresthesia: no  Fluid Appearance:clear  Medications: bupivacaine (MARCAINE) 0.5 % injection, 2 mL  Med Administered at 11/17/2021 10:19 AM   Post Assessment  Patient Tolerance:patient tolerated the procedure well with no apparent complications  Complications no  Additional Notes  Procedure:  Pt assisted to sitting position, with legs in position of comfort over side of bed.  Pt. instructed in optimal spine presentation, the spine was prepped/ Draped and the skin at insertion site was anesthetized with 1% Lidocaine 2 ml.  The spinal needle was then advanced until CSF flow was obtained and LA was injected:

## 2021-11-17 NOTE — H&P
Patient Name: Ashok Chung  MRN: [id number]  : 1955  DOS: 2021    Attending: Connor Anderson,*    Primary Care Provider: Marina Cobb PA-C      Chief complaint: Left knee pain    Subjective   Patient is a pleasant 66 y.o. male presented for scheduled surgery by Dr. Anderson.  He underwent left total knee arthroplasty under spinal anesthesia.  He tolerated surgery well and was admitted for further medical management.  His knee has been painful for more than 8 years.  He uses a brace occasionally with ambulation.  He denies recent falls.    When seen in PACU he is doing well.  His pain is well controlled.  He denies nausea, shortness of breath or chest pain.  No history of DVT or PE.    Allergies:  Allergies   Allergen Reactions   • Penicillins Anaphylaxis and Hives   • Sulfa Antibiotics Hives       Meds:  Medications Prior to Admission   Medication Sig Dispense Refill Last Dose   • cetirizine (zyrTEC) 10 MG tablet Take 10 mg by mouth Daily.   2021 at 2300   • Cholecalciferol (Vitamin D3) 50 MCG ( UT) tablet Take 4,000 Units by mouth Daily.   2021 at 2300   • lisinopril (PRINIVIL,ZESTRIL) 20 MG tablet Take 20 mg by mouth Every Night.   2021 at 2300   • Melatonin 10 MG tablet Take  by mouth.   2021 at 2300   • glucosamine sulfate 500 MG capsule capsule Take  by mouth 3 (Three) Times a Day With Meals.   11/3/2021         History:   Past Medical History:   Diagnosis Date   • Arthritis    • Elevated cholesterol    • Hearing loss    • History of COVID-19 2021    blood test showed positive antibodies in 2021 (no symptoms)   • History of staph infection 2004    Lost two vertebrae as a result of staph infection   • Hypercalcemia    • Hypertension    • Wears glasses      Past Surgical History:   Procedure Laterality Date   • APPENDECTOMY     • COLONOSCOPY     • HERNIA REPAIR     • KNEE ARTHROSCOPY Left    • KNEE SURGERY     • SPINE SURGERY     • TONSILLECTOMY    "    Family History   Problem Relation Age of Onset   • Heart disease Mother    • Arthritis Mother    • Heart disease Father    • Heart disease Sister    • Asthma Sister    • No Known Problems Brother    • No Known Problems Sister      Social History     Tobacco Use   • Smoking status: Never Smoker   • Smokeless tobacco: Former User     Types: Chew   Vaping Use   • Vaping Use: Never used   Substance Use Topics   • Alcohol use: Yes     Alcohol/week: 5.0 standard drinks     Types: 4 Cans of beer, 1 Shots of liquor per week   • Drug use: Never   He is  with 2 children.  He is retired from a  home.    Review of Systems  Pertinent items are noted in HPI, all other systems reviewed and negative    Vital Signs  /95 (BP Location: Right arm)   Pulse 56   Temp 98 °F (36.7 °C) (Temporal)   Resp 16   Ht 170.2 cm (67\")   Wt 96.8 kg (213 lb 6.4 oz)   SpO2 99%   BMI 33.42 kg/m²     Physical Exam:    General Appearance:    Alert, cooperative, in no acute distress   Head:    Normocephalic, without obvious abnormality, atraumatic   Eyes:            Lids and lashes normal, conjunctivae and sclerae normal, no   icterus, no pallor, corneas clear,    Ears:    Ears appear intact with no abnormalities noted   Throat:   No oral lesions, no thrush, oral mucosa moist   Neck:   No adenopathy, supple, trachea midline, no thyromegaly    Lungs:     Clear to auscultation,respirations regular, even and unlabored    Heart:    Regular rhythm and normal rate, normal S1 and S2, no murmur, no gallop   Abdomen:     Normal bowel sounds, no masses, no organomegaly, soft non-tender, non-distended, no guarding, no rebound  tenderness   Genitalia:    Deferred   Extremities:  Left knee Ace wrap CDI.  No blood present.   Pulses:   Pulses palpable and equal bilaterally   Skin:   No bleeding, bruising or rash   Neurologic:   Cranial nerves 2 - 12 grossly intact. Flexion and dorsiflexion intact bilateral feet.        I reviewed the " patient's new clinical results.       Results from last 7 days   Lab Units 11/11/21  1108   WBC 10*3/mm3 8.43   HEMOGLOBIN g/dL 15.2   HEMATOCRIT % 45.9   PLATELETS 10*3/mm3 234     Results from last 7 days   Lab Units 11/11/21  1108   SODIUM mmol/L 138   POTASSIUM mmol/L 5.0   CHLORIDE mmol/L 102   CO2 mmol/L 24.0   BUN mg/dL 26*   CREATININE mg/dL 0.84   CALCIUM mg/dL 11.1*   GLUCOSE mg/dL 112*     Lab Results   Component Value Date    HGBA1C 5.60 11/11/2021         Assessment and Plan:     Status post total left knee replacement    OA (osteoarthritis) of knee    Hypertension      Plan  1. PT/OT- WBAT LLE  2. Pain control-prns, AC nerve block   3. IS-encourage  4. DVT proph- Mechs/ASA  5. Bowel regimen  6. Resume home medications as appropriate  7. Monitor post-op labs  8. DC planning for home    HTN  - Continue home lisinopril  - Monitor BP   - Holding parameters for BP meds  - Labetalol PRN for SBP>170      ELVA Woodson  11/17/21  16:15 EST

## 2021-11-17 NOTE — PLAN OF CARE
Problem: Adult Inpatient Plan of Care  Goal: Plan of Care Review  Outcome: Ongoing, Progressing  Flowsheets (Taken 11/17/2021 1703)  Outcome Summary: VSS, RA. Pt resting and recovering from spinal. Still slightly numb in feet. Waiting for PT clearance. Minimal complaints of pain at this time. Continue to monitor.   Goal Outcome Evaluation:

## 2021-11-17 NOTE — PLAN OF CARE
Problem: Adult Inpatient Plan of Care  Goal: Plan of Care Review  Flowsheets (Taken 11/17/2021 3428)  Progress: improving  Plan of Care Reviewed With: patient  Outcome Summary: PT eval complete. Pt ambulated 250 feet using RW, CGA, and one person to manage equipment. Gait limited by fatigue and weakness. Bed mobility performed with supervision and STS with CGA. No knee buckling noted. Pt IND with SLR. Will assess L knee AROM POD#1. Reviewed HEP and knee precautions. PADD score = 9. Recommend pt d/c home with assist and HHPT when appropriate.   Goal Outcome Evaluation:  Plan of Care Reviewed With: patient        Progress: improving  Outcome Summary: PT eval complete. Pt ambulated 250 feet using RW, CGA, and one person to manage equipment. Gait limited by fatigue and weakness. Bed mobility performed with supervision and STS with CGA. No knee buckling noted. Pt IND with SLR. Will assess L knee AROM POD#1. Reviewed HEP and knee precautions. PADD score = 9. Recommend pt d/c home with assist and HHPT when appropriate.

## 2021-11-17 NOTE — ANESTHESIA PREPROCEDURE EVALUATION
Anesthesia Evaluation     Patient summary reviewed and Nursing notes reviewed                Airway   Mallampati: II  TM distance: >3 FB  Neck ROM: full  No difficulty expected  Dental - normal exam     Pulmonary - negative pulmonary ROS and normal exam   Cardiovascular - normal exam    (+) hypertension, hyperlipidemia,       Neuro/Psych- negative ROS  GI/Hepatic/Renal/Endo    (+) obesity,       Musculoskeletal     Abdominal  - normal exam    Bowel sounds: normal.   Substance History - negative use     OB/GYN negative ob/gyn ROS         Other   arthritis,                      Anesthesia Plan    ASA 2     MAC and spinal   (Peripheral nerve block + cath for post op pain relief)  intravenous induction     Anesthetic plan, all risks, benefits, and alternatives have been provided, discussed and informed consent has been obtained with: patient.    Plan discussed with CRNA.

## 2021-11-17 NOTE — OP NOTE
Orthopaedics Operative Report    PREOPERATIVE DIAGNOSIS: Primary osteoarthritis left knee    POSTOPERATIVE DIAGNOSIS: Same    PROCEDURE PERFORMED: Left total knee arthroplasty, CPT 42205    SURGEON: Connor Anderson MD    ANESTHESIA:  Spinal    STAFF:  Circulator: Shasta Tarango RN; Eric Martin, DARNELL Extern; Randolph Licona, DARNELL  Scrub Person: Amanda Yi; Nighat Mckeon RN  Nursing Assistant: Shantell Alvarez PCT; Shantell Bustillos PCT  Assistant: Mary Kay Doll PA-C    TOURNIQUET TIME: 105 min    ESTIMATED BLOOD LOSS: 50 mL    COMPLICATIONS: None apparent.    RELEASES: Lateralizing of the tibial component with slight downsizing and removal of proximal medial tibial osteophyte, full posterior medial release of the MCL    TXA: IV    IMPLANTS:     Implant Name Type Inv. Item Serial No.  Lot No. LRB No. Used Action   CMT BONE R 1X40 - [id number] Implant CMT BONE R 1X40  [employer] [id number] Left 2 Implanted   PAT PERSONA ALLPOLY CMT 8.5X32MM - [id number] Implant PAT PERSONA ALLPOLY CMT 8.5X32MM  [employer] [id number] Left 1 Implanted   STEM TIB PERSONA CMT 5D SZF LT - [id number] Implant STEM TIB PERSONA CMT 5D SZF LT  [employer] [id number] Left 1 Implanted   COMP FEM/KN PERSONA CR CMT COCR STD SZ8 LT - [id number] Implant COMP FEM/KN PERSONA CR CMT COCR STD SZ8 LT  [employer] [id number] Left 1 Implanted   ART/SRF KN PERSONA/VE PS EF 8TO11 12MM LT - [id number] Implant ART/SRF KN PERSONA/VE PS EF 8TO11 12MM LT  [employer] [id number] Left 1 Implanted       Marisela Persona CR femur size 8 standard  size F tibia  32 patella button   Size 12 Vitamin E Medial Congruent highly crosslinked polyethylene articular surface    PREOPERATIVE ANTIBIOTICS: Vancomycin    REFERRING PHYSICIAN: CARRINGTON Douglas    INDICATIONS: Failure of nonoperative treatment including injections, bracing, and activity modification.    DESCRIPTION OF PROCEDURE: After informed consent was obtained, the patient was  taken to the operating room. The patient was given a dose of IV antibiotics prior to incision.After the smooth induction of spinal anesthesia, the patient’s left lower extremity was prepped and draped in the usual fashion for this type of procedure. We performed a timeout to verify site and the procedure to be performed.  We began with exsanguination of the left lower extremity using an Esmarch bandage and inflation of tourniquet to 300 mmHg. We made our standard midline incision and medial parapatellar approach. We took 20% of the quadriceps tendon medially and a sleeve of tissue around the patella for repair as well a sliver of patellar tendon. Dissected extra-ostially but subperiosteally on the medial side taking off the superficial and deep MCL from the proximal tibia. These were protected throughout the procedure. Visible medial meniscus was excised. The retropatellar fat pad was excised. The ACL and visible lateral meniscus was excised as well as the synovium from the anterior surface of the distal femur.  Osteophytes were removed from the distal femur and proximal tibia at this point.       We then everted the patella and this was resurfaced, restoring patellar height. The patellar height was 24 mm preoperatively and we cut the patella to 15 mm and sized the patella to a 32.  The lugholes were drilled and the component slightly medialized.      We then turned our attention to preparation of the femur. We placed our intramedullary distal femoral guide into place set on 5 degrees of valgus and due to preoperative flexion contracture of approximately 20 degrees, we took an extra 3 mm of bone off of the distal femur. The distal femur was cut protecting medial and lateral structures. We then marked Shawnee's line and sized our femur to be a size 8.  With an geo wing, we initially felt that our anterior cut would potentially notch and therefore we shifted the block up to 2 mm.  We marked 3° of external rotation  which correlated well with Hamilton's line. We then secured this block into place and made our anterior, posterior, and chamfer cuts. The pins were removed and the bone cuts were completed and freshened up. We then excised our posterior cruciate ligament and exposed the proximal tibia. We took 10 mm of bone off the lateral side. We protected medial and lateral structures during our cut as well as the patellar tendon. We completed the cut. We sized the tibia to be a size F. We then removed meniscus from the back of the knee. We used our flexion extension blocks to make sure our flexion and extension gaps were acceptable. We then completed the preparation of the tibia, aiming the center of the baseplate at the medial third of the tibial tubercle.     We then completed the preparation of our femoral component.  We then trialed and were stable on the medial side at 0°, 30°, 60°, 90° and 120°. The lug holes were then drilled.  We then cemented these components into place. Once the cement had cured, we removed excess cement. We then trialed and a size 12 polyethylene spacer had good stability and full range of motion.  We then deflated the tourniquet prior to placement of our final polyethylene spacer. Any bleeding seen in the back of the knee was controlled. The final spacer was then secured into place. We then used a final irrigation consisting of Irricept and diluted Betadine throughout the knee. We obtained hemostasis. We then closed our deep parapatellar approach using 0 Ethibond in an interrupted fashion. We then injected our periarticular injection into the knee which consisted of 20 ml of NS, 20 ml of 0.5% lidocaine with epi, 20 ml of 0.5% bupivicaine, 30 mg of toradol, and 8 mg of morphine. We then closed our subcutaneous layer using running 2-0 Vicryl and interrupted 2-0 Vicryl. We closed our skin using a running 3-0 Monocryl. We placed an Exofin Fusion dressing system over the incision and took down the  drapes. The patient was transferred back to their hospital bed and then taken to the recovery room in stable condition. All counts were correct postoperatively. I performed the case.    First assistant: Mary Kay Doll PA-C    The skilled assistance of the above noted first assistant was necessary during this complex surgical procedure.  The surgical assistant assisted with every aspect of the operation including, but not limited to, proper and safe positioning of the patient, obtaining adequate surgical exposure, manipulation of surgical instruments to make the proper bone cuts, cementing of the final implants, the continual process of hemostasis during the procedure itself in addition to surgical wound closure and removal of the patient from the operating table and returning the patient back to the [facility].  The assistance of the surgical assistant allowed me to perform the most sensitive and technical potions of this operation using 2 hands, thus enhancing efficiency and patient safety.  This would not be possible without the help of a skilled assistant familiar with the procedure and capable of safely performing the aforementioned tasks.       POSTOPERATIVE PLAN:  1. Weight bearing as tolerated left lower extremity.  2. The patient will receive an indwelling low femoral nerve block in the recovery room.  3. 24-hours of IV vancomycin.  4.  Full dose aspirin daily x6 weeks for DVT prophylaxis.  5. The patient will be under the medical care of Dr. Fransisco Anderson M.D.*

## 2021-11-18 VITALS
DIASTOLIC BLOOD PRESSURE: 62 MMHG | SYSTOLIC BLOOD PRESSURE: 136 MMHG | TEMPERATURE: 98.1 F | HEART RATE: 69 BPM | HEIGHT: 67 IN | WEIGHT: 213.4 LBS | OXYGEN SATURATION: 99 % | BODY MASS INDEX: 33.49 KG/M2 | RESPIRATION RATE: 18 BRPM

## 2021-11-18 DIAGNOSIS — Z96.652 STATUS POST TOTAL LEFT KNEE REPLACEMENT: Primary | ICD-10-CM

## 2021-11-18 PROBLEM — G89.18 POSTOPERATIVE PAIN: Status: ACTIVE | Noted: 2021-11-18

## 2021-11-18 LAB
ANION GAP SERPL CALCULATED.3IONS-SCNC: 7 MMOL/L (ref 5–15)
BASOPHILS # BLD AUTO: 0.04 10*3/MM3 (ref 0–0.2)
BASOPHILS NFR BLD AUTO: 0.3 % (ref 0–1.5)
BUN SERPL-MCNC: 22 MG/DL (ref 8–23)
BUN/CREAT SERPL: 23.9 (ref 7–25)
CALCIUM SPEC-SCNC: 9.8 MG/DL (ref 8.6–10.5)
CHLORIDE SERPL-SCNC: 104 MMOL/L (ref 98–107)
CO2 SERPL-SCNC: 25 MMOL/L (ref 22–29)
CREAT SERPL-MCNC: 0.92 MG/DL (ref 0.76–1.27)
DEPRECATED RDW RBC AUTO: 43.8 FL (ref 37–54)
EOSINOPHIL # BLD AUTO: 0.1 10*3/MM3 (ref 0–0.4)
EOSINOPHIL NFR BLD AUTO: 0.8 % (ref 0.3–6.2)
ERYTHROCYTE [DISTWIDTH] IN BLOOD BY AUTOMATED COUNT: 12.9 % (ref 12.3–15.4)
GFR SERPL CREATININE-BSD FRML MDRD: 82 ML/MIN/1.73
GLUCOSE SERPL-MCNC: 101 MG/DL (ref 65–99)
HCT VFR BLD AUTO: 36.4 % (ref 37.5–51)
HGB BLD-MCNC: 11.9 G/DL (ref 13–17.7)
IMM GRANULOCYTES # BLD AUTO: 0.04 10*3/MM3 (ref 0–0.05)
IMM GRANULOCYTES NFR BLD AUTO: 0.3 % (ref 0–0.5)
LYMPHOCYTES # BLD AUTO: 1.15 10*3/MM3 (ref 0.7–3.1)
LYMPHOCYTES NFR BLD AUTO: 9.2 % (ref 19.6–45.3)
MCH RBC QN AUTO: 30.4 PG (ref 26.6–33)
MCHC RBC AUTO-ENTMCNC: 32.7 G/DL (ref 31.5–35.7)
MCV RBC AUTO: 92.9 FL (ref 79–97)
MONOCYTES # BLD AUTO: 1.2 10*3/MM3 (ref 0.1–0.9)
MONOCYTES NFR BLD AUTO: 9.6 % (ref 5–12)
NEUTROPHILS NFR BLD AUTO: 10 10*3/MM3 (ref 1.7–7)
NEUTROPHILS NFR BLD AUTO: 79.8 % (ref 42.7–76)
NRBC BLD AUTO-RTO: 0 /100 WBC (ref 0–0.2)
PLATELET # BLD AUTO: 201 10*3/MM3 (ref 140–450)
PMV BLD AUTO: 10.1 FL (ref 6–12)
POTASSIUM SERPL-SCNC: 4.1 MMOL/L (ref 3.5–5.2)
RBC # BLD AUTO: 3.92 10*6/MM3 (ref 4.14–5.8)
SODIUM SERPL-SCNC: 136 MMOL/L (ref 136–145)
WBC NRBC COR # BLD: 12.53 10*3/MM3 (ref 3.4–10.8)

## 2021-11-18 PROCEDURE — 63710000001 ACETAMINOPHEN 500 MG TABLET: Performed by: INTERNAL MEDICINE

## 2021-11-18 PROCEDURE — 63710000001 ASPIRIN EC 325 MG TABLET DELAYED-RELEASE: Performed by: ORTHOPAEDIC SURGERY

## 2021-11-18 PROCEDURE — 63710000001 OXYCODONE-ACETAMINOPHEN 7.5-325 MG TABLET: Performed by: ORTHOPAEDIC SURGERY

## 2021-11-18 PROCEDURE — 80048 BASIC METABOLIC PNL TOTAL CA: CPT | Performed by: ORTHOPAEDIC SURGERY

## 2021-11-18 PROCEDURE — A9270 NON-COVERED ITEM OR SERVICE: HCPCS | Performed by: ORTHOPAEDIC SURGERY

## 2021-11-18 PROCEDURE — 99024 POSTOP FOLLOW-UP VISIT: CPT | Performed by: ORTHOPAEDIC SURGERY

## 2021-11-18 PROCEDURE — 97165 OT EVAL LOW COMPLEX 30 MIN: CPT

## 2021-11-18 PROCEDURE — 97535 SELF CARE MNGMENT TRAINING: CPT

## 2021-11-18 PROCEDURE — 97116 GAIT TRAINING THERAPY: CPT

## 2021-11-18 PROCEDURE — A9270 NON-COVERED ITEM OR SERVICE: HCPCS | Performed by: INTERNAL MEDICINE

## 2021-11-18 PROCEDURE — 85025 COMPLETE CBC W/AUTO DIFF WBC: CPT | Performed by: ORTHOPAEDIC SURGERY

## 2021-11-18 PROCEDURE — 97110 THERAPEUTIC EXERCISES: CPT

## 2021-11-18 RX ORDER — DOCUSATE SODIUM 100 MG/1
100 CAPSULE, LIQUID FILLED ORAL 2 TIMES DAILY
Qty: 30 CAPSULE | Refills: 0 | Status: SHIPPED | OUTPATIENT
Start: 2021-11-18 | End: 2021-12-03

## 2021-11-18 RX ORDER — ACETAMINOPHEN 500 MG
1000 TABLET ORAL EVERY 8 HOURS
Qty: 42 TABLET | Refills: 0
Start: 2021-11-18 | End: 2021-11-25

## 2021-11-18 RX ORDER — ASPIRIN 325 MG
325 TABLET, DELAYED RELEASE (ENTERIC COATED) ORAL DAILY
Qty: 42 TABLET | Refills: 0 | Status: SHIPPED | OUTPATIENT
Start: 2021-11-19 | End: 2021-12-31

## 2021-11-18 RX ORDER — MELOXICAM 15 MG/1
15 TABLET ORAL DAILY
Qty: 15 TABLET | Refills: 0 | Status: SHIPPED | OUTPATIENT
Start: 2021-11-18 | End: 2021-12-03

## 2021-11-18 RX ORDER — ACETAMINOPHEN 500 MG
1000 TABLET ORAL ONCE
Status: COMPLETED | OUTPATIENT
Start: 2021-11-18 | End: 2021-11-18

## 2021-11-18 RX ORDER — ACETAMINOPHEN 500 MG
1000 TABLET ORAL EVERY 6 HOURS PRN
Status: DISCONTINUED | OUTPATIENT
Start: 2021-11-18 | End: 2021-11-18

## 2021-11-18 RX ORDER — OXYCODONE HYDROCHLORIDE 5 MG/1
5 TABLET ORAL EVERY 4 HOURS PRN
Qty: 40 TABLET | Refills: 0 | Status: SHIPPED | OUTPATIENT
Start: 2021-11-18 | End: 2021-11-24 | Stop reason: SDUPTHER

## 2021-11-18 RX ADMIN — ASPIRIN 325 MG: 325 TABLET, COATED ORAL at 08:49

## 2021-11-18 RX ADMIN — ACETAMINOPHEN 1000 MG: 500 TABLET, FILM COATED ORAL at 09:36

## 2021-11-18 RX ADMIN — OXYCODONE HYDROCHLORIDE AND ACETAMINOPHEN 1 TABLET: 7.5; 325 TABLET ORAL at 12:20

## 2021-11-18 RX ADMIN — OXYCODONE HYDROCHLORIDE AND ACETAMINOPHEN 1 TABLET: 7.5; 325 TABLET ORAL at 05:46

## 2021-11-18 NOTE — THERAPY DISCHARGE NOTE
Acute Care - Occupational Therapy Discharge  Psychiatric    Patient Name: Ashok Chung  : 1955    MRN: [id number]                              Today's Date: 2021       Admit Date: 2021    Visit Dx:     ICD-10-CM ICD-9-CM   1. Status post total left knee replacement  Z96.652 V43.65   2. Primary osteoarthritis of left knee  M17.12 715.16     Patient Active Problem List   Diagnosis   • OA (osteoarthritis) of knee   • Status post total left knee replacement   • Hypertension   • Postoperative pain     Past Medical History:   Diagnosis Date   • Arthritis    • Elevated cholesterol    • Hearing loss    • History of COVID-19 2021    blood test showed positive antibodies in 2021 (no symptoms)   • History of staph infection 2004    Lost two vertebrae as a result of staph infection   • Hypercalcemia    • Hypertension    • Wears glasses      Past Surgical History:   Procedure Laterality Date   • APPENDECTOMY     • COLONOSCOPY     • HERNIA REPAIR     • KNEE ARTHROSCOPY Left    • KNEE SURGERY     • SPINE SURGERY     • TONSILLECTOMY        General Information     Row Name 21 1020          OT Time and Intention    Document Type evaluation; discharge evaluation/summary  -     Mode of Treatment occupational therapy  -     Row Name 21 1020          General Information    Patient Profile Reviewed yes  -LC     Prior Level of Function all household mobility; transfer; ADL's  -     Existing Precautions/Restrictions fall  L adductor nerve cath  -     Barriers to Rehab none identified  -     Row Name 21 1020          Living Environment    Lives With spouse  -     Row Name 21 1020          Home Main Entrance    Number of Stairs, Main Entrance three  -LC     Stair Railings, Main Entrance none  -LC     Row Name 21 1020          Stairs Within Home, Primary    Number of Stairs, Within Home, Primary three  -LC     Stair Railings, Within Home, Primary none  -LC     Row Name  11/18/21 1020          Cognition    Orientation Status (Cognition) oriented x 4  -     Row Name 11/18/21 1020          Safety Issues, Functional Mobility    Safety Issues Affecting Function (Mobility) safety precaution awareness; safety precautions follow-through/compliance  -     Impairments Affecting Function (Mobility) strength; pain; range of motion (ROM); endurance/activity tolerance  -           User Key  (r) = Recorded By, (t) = Taken By, (c) = Cosigned By    Initials Name Provider Type     Michelle Juárez OT Occupational Therapist               Mobility/ADL's     Row Name 11/18/21 1101          Bed Mobility    Bed Mobility scooting/bridging; supine-sit  -     Scooting/Bridging [place] (Bed Mobility) standby assist; verbal cues  -     Supine-Sit [place] (Bed Mobility) standby assist; verbal cues  -     Assistive Device (Bed Mobility) bed rails; head of bed elevated  -     Comment (Bed Mobility) Educated pt. on use of leg  to increase comfort and independence with bed mobility. Pt. demonstrated and verbalized understanding.  -     Row Name 11/18/21 1101          Transfers    Comment (Transfers) VCs for hand placement and to step L LE forward to improve comfort during T/Fs. Pt. educated on adductor nerve catheter mgmt duringh T/Fs. Demonstrated understanding.  -     Bed-Chair [place] (Transfers) contact guard; verbal cues  RW  -     Sit-Stand [place] (Transfers) contact guard; verbal cues  -     Row Name 11/18/21 1101          Sit-Stand Transfer    Assistive Device (Sit-Stand Transfers) walker, front-wheeled  -     Row Name 11/18/21 1101          Functional Mobility    Functional Mobility- Ind. Level contact guard assist  -     Functional Mobility- Device rolling walker  -     Functional Mobility-Distance (Feet) 30  -     Functional Mobility- Comment Pt. ambulated to bathroom with RW and CGA for safety. No LOB.  -     Row Name 11/18/21 1101           Activities of Daily Living    BADL Assessment/Intervention bathing; upper body dressing; lower body dressing; grooming; toileting  -     Row Name 11/18/21 1101          Mobility    Extremity Weight-bearing Status left lower extremity  -     Left Lower Extremity (Weight-bearing Status) weight-bearing as tolerated (WBAT)  -     Row Name 11/18/21 [facility]          Bathing Assessment/Intervention    Comment (Bathing) Educated pt. to not shower until adductor nerve catheter is discontinued and cleared with MD.  -     Row Name 11/18/21 [facility]          Upper Body Dressing Assessment/Training    [place] Level (Upper Body Dressing) upper body dressing skills; pull-over garment; independent  -     Position (Upper Body Dressing) unsupported sitting  -     Row Name 11/18/21 [facility]          Lower Body Dressing Assessment/Training    [place] Level (Lower Body Dressing) verbal cues; standby assist; don; pants/bottoms  -     Position (Lower Body Dressing) unsupported sitting; supported standing  -     Comment (Lower Body Dressing) Educated pt. on august dressing technique and adductor nerve catheter mgmt during LBD. Pt. verbalized and demonstrated understanding.  -     Row Name 11/18/21 [facility]          Grooming Assessment/Training    [place] Level (Grooming) wash face, hands; oral care regimen; set up  -     Position (Grooming) supported standing  -     Comment (Grooming) Pt. completed grooming tasks standing at sink with RW.  -[facility] Name 11/18/21 1101          Toileting Assessment/Training    [place] Level (Toileting) adjust/manage clothing; perform perineal hygiene; standby assist  -     Assistive Devices (Toileting) commode; grab bar/safety frame  -     Position (Toileting) supported sitting; supported standing  -     Comment (Toileting) Pt. educated on adductor nerve catheter mgmt during clothing mgmt portion of toileting. Pt. demonstrated understanding.  -           User Key  (r) =  Recorded By, (t) = Taken By, (c) = Cosigned By    Initials Name Provider Type     Michelle Juárez OT Occupational Therapist               Obj/Interventions     [facility] Name 11/18/21 1108          Sensory Assessment (Somatosensory)    Sensory Assessment (Somatosensory) UE sensation intact  -[facility] Name 11/18/21 1108          Vision Assessment/Intervention    Visual Impairment/Limitations corrective lenses full-time  -[facility] Name 11/18/21 1108          Range of Motion Comprehensive    General Range of Motion bilateral upper extremity ROM WNL  -[facility] Name 11/18/21 1108          Strength Comprehensive (MMT)    General Manual Muscle Testing (MMT) Assessment upper extremity strength deficits identified  -[facility] Name 11/18/21 1108          Upper Extremity (Manual Muscle Testing)    Upper Extremity: Manual Muscle Testing (MMT) left UE strength is WFL; right UE strength is WFL  -[facility] Name 11/18/21 1108          Motor Skills    Motor Skills coordination  -     Coordination upper extremity; bilateral; fine motor deficit; WNL  -[facility] Name 11/18/21 1108          Balance    Balance Assessment sitting static balance; sitting dynamic balance; standing static balance; standing dynamic balance  -     Static Sitting Balance WNL; unsupported; sitting, edge of bed  -     Dynamic Sitting Balance WFL; unsupported; sitting in chair  -     Static Standing Balance WFL; supported; standing  -     Dynamic Standing Balance WFL; supported; standing  -     Balance Interventions sitting; standing; sit to stand; occupation based/functional task; weight shifting activity  -     Comment, Balance No LOB.  -           User Key  (r) = Recorded By, (t) = Taken By, (c) = Cosigned By    Initials Name Provider Type     Michelle Juárez OT Occupational Therapist               Goals/Plan     Row Name 11/18/21 1111          Transfer Goal 1 (OT)    Activity/Assistive Device (Transfer Goal 1, OT)  sit-to-stand/stand-to-sit; bed-to-chair/chair-to-bed; walker, rolling  -     [place] Level/Cues Needed (Transfer Goal 1, OT) contact guard assist  -LC     Time Frame (Transfer Goal 1, OT) 1 day; long term goal (LTG)  -LC     Progress/Outcome (Transfer Goal 1, OT) goal met  -     Row Name 11/18/21 1111          Dressing Goal 1 (OT)    Activity/Device (Dressing Goal 1, OT) lower body dressing  -LC     [place]/Cues Needed (Dressing Goal 1, OT) standby assist; verbal cues required  -LC     Time Frame (Dressing Goal 1, OT) 1 day; long term goal (LTG)  -LC     Progress/Outcome (Dressing Goal 1, OT) goal met  -     Row Name 11/18/21 1111          Toileting Goal 1 (OT)    Activity/Device (Toileting Goal 1, OT) adjust/manage clothing; perform perineal hygiene; commode; grab bar/safety frame  -     [place] Level/Cues Needed (Toileting Goal 1, OT) standby assist; verbal cues required  -LC     Time Frame (Toileting Goal 1, OT) 1 day; long term goal (LTG)  -LC     Progress/Outcome (Toileting Goal 1, OT) goal met  -           User Key  (r) = Recorded By, (t) = Taken By, (c) = Cosigned By    Initials Name Provider Type    Michelle Sanz, WAYNE Occupational Therapist               Clinical Impression     Row Name 11/18/21 1105          Pain Assessment    Additional Documentation Pain Scale: Numbers Pre/Post-Treatment (Group)  -     Row Name 11/18/21 1101          Pain Scale: Numbers Pre/Post-Treatment    Pretreatment Pain Rating 7/10  -LC     Posttreatment Pain Rating 6/10  -LC     Pain Location - Side Left  -LC     Pain Location - Orientation posterior  -LC     Pain Location knee  -LC     Pain Intervention(s) Repositioned; Ambulation/increased activity  -     Row Name 11/18/21 1104          Plan of Care Review    Plan of Care Reviewed With patient  -LC     Progress improving  -     Outcome Summary OT eval completed. Pt. educated on august dressing technique and adductor nerve catheter mgmt during T/Fs,  LBD, and toileting. Pt. demonstrated understanding. Completed bed mobility with SBA and T/Fs with CGA. Demonstrates LBD and toileting with SBA. Recommend home with assistance and home health at discharge.  -     Row Name 11/18/21 1109          Therapy Assessment/Plan (OT)    Patient/Family Therapy Goal Statement (OT) To take care of self  -LC     Therapy Frequency (OT) evaluation only  -     Row Name 11/18/21 1109          Therapy Plan Review/Discharge Plan (OT)    Anticipated Discharge Disposition (OT) home with assist; home with home health  -     Row Name 11/18/21 1109          Vital Signs    Pre Systolic BP Rehab --  VSS  -LC     Pre Patient Position Supine  -LC     Intra Patient Position Standing  -LC     Post Patient Position Sitting  -     Row Name 11/18/21 1109          Positioning and Restraints    Pre-Treatment Position in bed  -LC     Post Treatment Position chair  -LC     In Bed notified nsg; supine; call light within reach; encouraged to call for assist; exit alarm on  -LC           User Key  (r) = Recorded By, (t) = Taken By, (c) = Cosigned By    Initials Name Provider Type    Michelle Sanz, OT Occupational Therapist               Outcome Measures     Row Name 11/18/21 1112          How much help from another is currently needed...    Putting on and taking off regular lower body clothing? 3  -LC     Bathing (including washing, rinsing, and drying) 3  -LC     Toileting (which includes using toilet bed pan or urinal) 3  -LC     Putting on and taking off regular upper body clothing 4  -LC     Taking care of personal grooming (such as brushing teeth) 4  -LC     Eating meals 4  -LC     AM-PAC 6 Clicks Score (OT) 21  -LC     Row Name 11/18/21 1005 11/18/21 0800       How much help from another person do you currently need...    Turning from your back to your side while in flat bed without using bedrails? 4  -LR 4  -EH    Moving from lying on back to sitting on the side of a flat bed without  bedrails? 4  -LR 4  -EH    Moving to and from a bed to a chair (including a wheelchair)? 3  -LR 3  -EH    Standing up from a chair using your arms (e.g., wheelchair, bedside chair)? 3  -LR 3  -EH    Climbing 3-5 steps with a railing? 3  -LR 1  -EH    To walk in hospital room? 3  -LR 2  -EH    AM-PAC 6 Clicks Score (PT) 20  -LR 17  -EH    Row Name 11/18/21 1112 11/18/21 1005       Functional Assessment    Outcome Measure Options AM-PAC 6 Clicks Daily Activity (OT)  - AM-PAC 6 Clicks Basic Mobility (PT)  -LR          User Key  (r) = Recorded By, (t) = Taken By, (c) = Cosigned By    Initials Name Provider Type    Michelle Cook, PT Physical Therapist    Brandie Weinstein, RN Registered Nurse    Michelle Sanz, OT Occupational Therapist              Occupational Therapy Education                 Title: PT OT SLP Therapies (In Progress)     Topic: Occupational Therapy (Not Started)     Point: ADL training (Not Started)     Description:   Instruct learner(s) on proper safety adaptation and remediation techniques during self care or transfers.   Instruct in proper use of assistive devices.              Learner Progress:  Not documented in this visit.          Point: Home exercise program (Not Started)     Description:   Instruct learner(s) on appropriate technique for monitoring, assisting and/or progressing therapeutic exercises/activities.              Learner Progress:  Not documented in this visit.          Point: Precautions (Not Started)     Description:   Instruct learner(s) on prescribed precautions during self-care and functional transfers.              Learner Progress:  Not documented in this visit.          Point: Body mechanics (Not Started)     Description:   Instruct learner(s) on proper positioning and spine alignment during self-care, functional mobility activities and/or exercises.              Learner Progress:  Not documented in this visit.                          OT Recommendation and  Plan  Retired Outcome Summary/Treatment Plan (OT)  Anticipated Discharge Disposition (OT): home with assist, home with home health  Therapy Frequency (OT): evaluation only  Plan of Care Review  Plan of Care Reviewed With: patient  Progress: improving  Outcome Summary: OT eval completed. Pt. educated on august dressing technique and adductor nerve catheter mgmt during T/Fs, LBD, and toileting. Pt. demonstrated understanding. Completed bed mobility with SBA and T/Fs with CGA. Demonstrates LBD and toileting with SBA. Recommend home with assistance and home health at discharge.  Plan of Care Reviewed With: patient  Outcome Summary: OT eval completed. Pt. educated on august dressing technique and adductor nerve catheter mgmt during T/Fs, LBD, and toileting. Pt. demonstrated understanding. Completed bed mobility with SBA and T/Fs with CGA. Demonstrates LBD and toileting with SBA. Recommend home with assistance and home health at discharge.     Time Calculation:    Time Calculation- OT     Row Name 11/18/21 1115 11/18/21 1005          Time Calculation- OT    OT Start Time 0857  - --     OT Received On 11/18/21  - --     OT Goal Re-Cert Due Date 11/28/21  -LC --            Timed Charges    49153 - Gait Training Minutes  -- 13  -LR     67342 - OT Self Care/Mgmt Minutes 20  -LC --            Untimed Charges    OT Eval/Re-eval Minutes 48  -LC --            Total Minutes    Timed Charges Total Minutes 20  -LC 13  -LR     Untimed Charges Total Minutes 48  -LC --      Total Minutes 68  -LC 13  -LR           User Key  (r) = Recorded By, (t) = Taken By, (c) = Cosigned By    Initials Name Provider Type    Michelle Cook, PT Physical Therapist    LC Michelle Juárez OT Occupational Therapist              Therapy Charges for Today     Code Description Service Date Service Provider Modifiers Qty    67430678411 HC OT SELF CARE/MGMT/TRAIN EA 15 MIN 11/18/2021 Michelle Juárez OT GO 1    45946241233 HC OT EVAL LOW COMPLEXITY 4  11/18/2021 Michelle Juárez, OT GO 1               Michelle Juárez, OT  11/18/2021

## 2021-11-18 NOTE — DISCHARGE SUMMARY
Patient Name: Ashok Chung  MRN: [id number]  : 1955  DOS: 2021    Attending: Connor Anderson,*    Primary Care Provider: Marina Cobb PA-C    Date of Admission:.2021  7:31 AM    Date of Discharge:  2021    Discharge Diagnosis:     Status post total left knee replacement    OA (osteoarthritis) of knee    Hypertension    Postoperative pain      Hospital Course    At admit:  Patient is a pleasant 66 y.o. male presented for scheduled surgery by Dr. Anderson.  He underwent left total knee arthroplasty under spinal anesthesia.  He tolerated surgery well and was admitted for further medical management.  His knee has been painful for more than 8 years.  He uses a brace occasionally with ambulation.  He denies recent falls.     When seen in PACU he is doing well.  His pain is well controlled.  He denies nausea, shortness of breath or chest pain.  No history of DVT or PE.     After admit:    Patient was provided pain medications as needed for pain control, along with adductor canal nerve block infusion of Ropivacaine.      Adjustments were made to pain medications to optimize postop pain management. Risks and benefits of opiate medications discussed with patient. HAMZAH report was reviewed.    Patient was seen by PT and OT and has progressed well over his stay.    He used an IS for atelectasis prophylaxis and aspirin along with mechanicals for DVT prophylaxis.    Home medications were resumed as appropriate, and labs were monitored and remained fairly stable.     With the progress he has made, Mr. Chung is ready for DC home today.      A will have an Arrow pump ( instructed on it during this admit).    Discussed with patient regarding plan and he shows understanding and agreement.    Patient will have HHPT following discharge.        Procedures Performed  Procedure(s):  LEFT TOTAL KNEE ARTHROPLASTY       Pertinent Test Results:    I reviewed the patient's new clinical results.  "  Results from last 7 days   Lab Units 21  1108   WBC 10*3/mm3 8.43   HEMOGLOBIN g/dL 15.2   HEMATOCRIT % 45.9   PLATELETS 10*3/mm3 234     Results from last 7 days   Lab Units 21  1108   SODIUM mmol/L 138   POTASSIUM mmol/L 5.0   CHLORIDE mmol/L 102   CO2 mmol/L 24.0   BUN mg/dL 26*   CREATININE mg/dL 0.84   CALCIUM mg/dL 11.1*   GLUCOSE mg/dL 112*     I reviewed the patient's new imaging including images and reports.      Physical therapy  Plan of Care Reviewed With: patient  Progress: improving  Outcome Summary: Patient ambulated 450 feet with RW and step through gait pattern, CGA, limited by fatigue and pain. ROM limited by pain and stiffness, L knee lacking 17 degrees extension AROM. L knee flexion AAROM 70 degrees in sitting. Patient climbed 3 steps with HHA and SPC with CGA, had difficulty clearing L foot. Descended 3 steps with SPC and one handrail to simulate stairs at home. Patient has been d/c home with family and HHPT.    Discharge Assessment:       Visit Vitals  /49 (BP Location: Right arm, Patient Position: Lying)   Pulse 69   Temp 97.3 °F (36.3 °C) (Oral)   Resp 18   Ht 170.2 cm (67\")   Wt 96.8 kg (213 lb 6.4 oz)   SpO2 98%   BMI 33.42 kg/m²     Temp (24hrs), Av.8 °F (36.6 °C), Min:97 °F (36.1 °C), Max:99.4 °F (37.4 °C)      General Appearance:    Alert, cooperative, in no acute distress   Lungs:     Clear to auscultation,respirations regular, even and                   unlabored    Heart:    Regular rhythm and normal rate, normal S1 and S2   Abdomen:     Normal bowel sounds, no masses, no organomegaly, soft        non-tender, non-distended, no guarding, no rebound                 tenderness   Extremities:   CDI dressing surgical knee . ACB cath present, arrow pump.   Pulses:   Pulses palpable and equal bilaterally   Skin:   No bleeding, bruising or rash   Neurologic:   Cranial nerves 2 - 12 grossly intact, sensation intact, Flexion and dorsiflexion intact bilateral feet.   "       Discharge Disposition: Home          Discharge Medications      New Medications      Instructions Start Date   acetaminophen 500 MG tablet  Commonly known as: TYLENOL   1,000 mg, Oral, Every 8 Hours, Take every 8 hours  as needed after 1 week      aspirin  MG tablet   325 mg, Oral, Daily   Start Date: November 19, 2021     docusate sodium 100 MG capsule  Commonly known as: COLACE   100 mg, Oral, 2 Times Daily      meloxicam 15 MG tablet  Commonly known as: MOBIC   15 mg, Oral, Daily      oxyCODONE 5 MG immediate release tablet  Commonly known as: Roxicodone   5 mg, Oral, Every 4 Hours PRN         Continue These Medications      Instructions Start Date   cetirizine 10 MG tablet  Commonly known as: zyrTEC   10 mg, Oral, Daily      lisinopril 20 MG tablet  Commonly known as: PRINIVIL,ZESTRIL   20 mg, Oral, Nightly      Melatonin 10 MG tablet   Oral      Vitamin D3 50 MCG (2000 UT) tablet   4,000 Units, Oral, Daily         Stop These Medications    glucosamine sulfate 500 MG capsule capsule            Discharge Diet: Resume prehospital diet    Activity at Discharge: Weightbearing as tolerated left lower extremity    Follow-up Appointments  Dr. Kauffman per his orders, in 3 weeks time        Dragon disclaimer:  Part of this encounter note is an electronic transcription/translation of spoken language to printed text. The electronic translation of spoken language may permit erroneous, or at times, nonsensical words or phrases to be inadvertently transcribed; Although I have reviewed the note for such errors, some may still exist.       Duane Moody MD  11/18/21  10:06 EST

## 2021-11-18 NOTE — PLAN OF CARE
Goal Outcome Evaluation:  Plan of Care Reviewed With: patient        Progress: improving  Outcome Summary: OT eval completed. Pt. educated on august dressing technique and adductor nerve catheter mgmt during T/Fs, LBD, and toileting. Pt. demonstrated understanding. Completed bed mobility with SBA and T/Fs with CGA. Demonstrates LBD and toileting with SBA. Recommend home with assistance and home health at discharge.

## 2021-11-18 NOTE — PLAN OF CARE
Problem: Adult Inpatient Plan of Care  Goal: Plan of Care Review  Recent Flowsheet Documentation  Taken 11/18/2021 1005 by Michelle Antonio, PT  Progress: improving  Plan of Care Reviewed With: patient  Outcome Summary: Patient ambulated 450 feet with RW and step through gait pattern, CGA, limited by fatigue and pain. ROM limited by pain and stiffness, L knee lacking 17 degrees extension AROM. L knee flexion AAROM 70 degrees in sitting. Patient climbed 3 steps with HHA and SPC with CGA, had difficulty clearing L foot. Descended 3 steps with SPC and one handrail to simulate stairs at home. Patient has been d/c home with family and HHPT.   Goal Outcome Evaluation:  Plan of Care Reviewed With: patient        Progress: improving  Outcome Summary: Patient ambulated 450 feet with RW and step through gait pattern, CGA, limited by fatigue and pain. ROM limited by pain and stiffness, L knee lacking 17 degrees extension AROM. L knee flexion AAROM 70 degrees in sitting. Patient climbed 3 steps with HHA and SPC with CGA, had difficulty clearing L foot. Descended 3 steps with SPC and one handrail to simulate stairs at home. Patient has been d/c home with family and HHPT.

## 2021-11-18 NOTE — PLAN OF CARE
Problem: Adult Inpatient Plan of Care  Goal: Plan of Care Review  11/18/2021 1320 by Brandie Gillis RN  Outcome: Met  11/18/2021 1319 by Brandie Gillis RN  Outcome: Ongoing, Progressing  Flowsheets (Taken 11/18/2021 1318)  Progress: improving  Plan of Care Reviewed With: patient  Outcome Summary: VSS.  Goal: Patient-Specific Goal (Individualized)  11/18/2021 1320 by Brandie Gillis RN  Outcome: Met  11/18/2021 1319 by Brandie Gillis RN  Outcome: Ongoing, Progressing  Goal: Absence of Hospital-Acquired Illness or Injury  11/18/2021 1320 by Brandie Gillis RN  Outcome: Met  11/18/2021 1319 by Brandie Gillis RN  Outcome: Ongoing, Progressing  Intervention: Identify and Manage Fall Risk  Recent Flowsheet Documentation  Taken 11/18/2021 1000 by Brandie Gillis RN  Safety Promotion/Fall Prevention:   assistive device/personal items within reach   clutter free environment maintained   activity supervised   fall prevention program maintained   gait belt   lighting adjusted   mobility aid in reach   nonskid shoes/slippers when out of bed   room organization consistent   safety round/check completed  Taken 11/18/2021 0800 by Brandie Gillis RN  Safety Promotion/Fall Prevention:   activity supervised   assistive device/personal items within reach   clutter free environment maintained   fall prevention program maintained   gait belt   lighting adjusted   mobility aid in reach   nonskid shoes/slippers when out of bed   room organization consistent   safety round/check completed  Intervention: Prevent Skin Injury  Recent Flowsheet Documentation  Taken 11/18/2021 1000 by Brandie Gillis RN  Body Position: position changed independently  Taken 11/18/2021 0800 by Brandie Gillis RN  Body Position: position changed independently  Intervention: Prevent and Manage VTE (venous thromboembolism) Risk  Recent Flowsheet Documentation  Taken 11/18/2021 0800 by Brandie Gillis RN  VTE Prevention/Management:   bilateral   dorsiflexion/plantar  flexion performed  Intervention: Prevent Infection  Recent Flowsheet Documentation  Taken 11/18/2021 1000 by Brandie Gillis RN  Infection Prevention:   hand hygiene promoted   rest/sleep promoted   single patient room provided  Taken 11/18/2021 0800 by Brandie Gillis RN  Infection Prevention:   hand hygiene promoted   rest/sleep promoted   single patient room provided  Goal: Optimal Comfort and Wellbeing  11/18/2021 1320 by Brandie Gillis RN  Outcome: Met  11/18/2021 1319 by Brandie Gillis RN  Outcome: Ongoing, Progressing  Intervention: Provide Person-Centered Care  Recent Flowsheet Documentation  Taken 11/18/2021 0800 by Brandie Gillis RN  Trust Relationship/Rapport:   care explained   choices provided  Goal: Readiness for Transition of Care  11/18/2021 1320 by Brandie Gillis RN  Outcome: Met  11/18/2021 1319 by Brandie Gillis RN  Outcome: Ongoing, Progressing     Problem: Bleeding (Surgery Nonspecified)  Goal: Absence of Bleeding  11/18/2021 1320 by Brandie Gillis RN  Outcome: Met  11/18/2021 1319 by Brandie Gillis RN  Outcome: Ongoing, Progressing     Problem: Bowel Elimination Impaired (Surgery Nonspecified)  Goal: Effective Bowel Elimination  11/18/2021 1320 by Brandie Gillis RN  Outcome: Met  11/18/2021 1319 by Brandie Gillis RN  Outcome: Ongoing, Progressing  Intervention: Promote Effective Bowel Elimination  Recent Flowsheet Documentation  Taken 11/18/2021 0800 by Brandie Gillis RN  Bowel Elimination Promotion: adequate fluid intake promoted     Problem: Infection (Surgery Nonspecified)  Goal: Absence of Infection Signs and Symptoms  11/18/2021 1320 by Brandie Gillis RN  Outcome: Met  11/18/2021 1319 by Brandie Gillis RN  Outcome: Ongoing, Progressing     Problem: Ongoing Anesthesia Effects (Surgery Nonspecified)  Goal: Anesthesia/Sedation Recovery  11/18/2021 1320 by Brandie Gillis RN  Outcome: Met  11/18/2021 1319 by Brandie Gillis RN  Outcome: Ongoing, Progressing  Intervention: Optimize Anesthesia  Recovery  Recent Flowsheet Documentation  Taken 11/18/2021 1000 by Brandie Gillis RN  Safety Promotion/Fall Prevention:   assistive device/personal items within reach   clutter free environment maintained   activity supervised   fall prevention program maintained   gait belt   lighting adjusted   mobility aid in reach   nonskid shoes/slippers when out of bed   room organization consistent   safety round/check completed  Taken 11/18/2021 0800 by Brandie Gillis RN  Safety Promotion/Fall Prevention:   activity supervised   assistive device/personal items within reach   clutter free environment maintained   fall prevention program maintained   gait belt   lighting adjusted   mobility aid in reach   nonskid shoes/slippers when out of bed   room organization consistent   safety round/check completed     Problem: Pain (Surgery Nonspecified)  Goal: Acceptable Pain Control  11/18/2021 1320 by Brandie Gillis RN  Outcome: Met  11/18/2021 1319 by Brandie Gillis RN  Outcome: Ongoing, Progressing  Intervention: Prevent or Manage Pain  Recent Flowsheet Documentation  Taken 11/18/2021 0800 by Brandie Gillis RN  Diversional Activities: television     Problem: Postoperative Nausea and Vomiting (Surgery Nonspecified)  Goal: Nausea and Vomiting Relief  11/18/2021 1320 by Brandie Gillis RN  Outcome: Met  11/18/2021 1319 by Brandie Gillis RN  Outcome: Ongoing, Progressing     Problem: Postoperative Urinary Retention (Surgery Nonspecified)  Goal: Effective Urinary Elimination  11/18/2021 1320 by Brandie Gillis RN  Outcome: Met  11/18/2021 1319 by Brandie Gillis RN  Outcome: Ongoing, Progressing     Problem: Fall Injury Risk  Goal: Absence of Fall and Fall-Related Injury  11/18/2021 1320 by Brandie Gillis RN  Outcome: Met  11/18/2021 1319 by Brandie Gillis RN  Outcome: Ongoing, Progressing  Intervention: Identify and Manage Contributors to Fall Injury Risk  Recent Flowsheet Documentation  Taken 11/18/2021 1000 by Brandie Gillis  RN  Medication Review/Management:   medications reviewed   dosing adjusted  Taken 11/18/2021 0800 by Brandie Gillis RN  Medication Review/Management:   medications reviewed   dosing adjusted  Intervention: Promote Injury-Free Environment  Recent Flowsheet Documentation  Taken 11/18/2021 1000 by Brandie Gillis RN  Safety Promotion/Fall Prevention:   assistive device/personal items within reach   clutter free environment maintained   activity supervised   fall prevention program maintained   gait belt   lighting adjusted   mobility aid in reach   nonskid shoes/slippers when out of bed   room organization consistent   safety round/check completed  Taken 11/18/2021 0800 by Brandie Gillis RN  Safety Promotion/Fall Prevention:   activity supervised   assistive device/personal items within reach   clutter free environment maintained   fall prevention program maintained   gait belt   lighting adjusted   mobility aid in reach   nonskid shoes/slippers when out of bed   room organization consistent   safety round/check completed   Goal Outcome Evaluation:  Plan of Care Reviewed With: patient        Progress: improving  Outcome Summary: VSS.

## 2021-11-18 NOTE — PLAN OF CARE
Problem: Adult Inpatient Plan of Care  Goal: Plan of Care Review  Outcome: Ongoing, Progressing  Flowsheets (Taken 11/18/2021 1318)  Progress: improving  Plan of Care Reviewed With: patient  Outcome Summary: VSS.  Goal: Patient-Specific Goal (Individualized)  Outcome: Ongoing, Progressing  Goal: Absence of Hospital-Acquired Illness or Injury  Outcome: Ongoing, Progressing  Intervention: Identify and Manage Fall Risk  Recent Flowsheet Documentation  Taken 11/18/2021 1000 by Brandie Gillis RN  Safety Promotion/Fall Prevention:   assistive device/personal items within reach   clutter free environment maintained   activity supervised   fall prevention program maintained   gait belt   lighting adjusted   mobility aid in reach   nonskid shoes/slippers when out of bed   room organization consistent   safety round/check completed  Taken 11/18/2021 0800 by Brandie Gillis RN  Safety Promotion/Fall Prevention:   activity supervised   assistive device/personal items within reach   clutter free environment maintained   fall prevention program maintained   gait belt   lighting adjusted   mobility aid in reach   nonskid shoes/slippers when out of bed   room organization consistent   safety round/check completed  Intervention: Prevent Skin Injury  Recent Flowsheet Documentation  Taken 11/18/2021 1000 by Brandie Gillis RN  Body Position: position changed independently  Taken 11/18/2021 0800 by Brandie Gillis RN  Body Position: position changed independently  Intervention: Prevent and Manage VTE (venous thromboembolism) Risk  Recent Flowsheet Documentation  Taken 11/18/2021 0800 by Brandie Gillis RN  VTE Prevention/Management:   bilateral   dorsiflexion/plantar flexion performed  Intervention: Prevent Infection  Recent Flowsheet Documentation  Taken 11/18/2021 1000 by Brandie Gillis RN  Infection Prevention:   hand hygiene promoted   rest/sleep promoted   single patient room provided  Taken 11/18/2021 0800 by Brandie Gillis  RN  Infection Prevention:   hand hygiene promoted   rest/sleep promoted   single patient room provided  Goal: Optimal Comfort and Wellbeing  Outcome: Ongoing, Progressing  Intervention: Provide Person-Centered Care  Recent Flowsheet Documentation  Taken 11/18/2021 0800 by Brandie Gillis RN  Trust Relationship/Rapport:   care explained   choices provided  Goal: Readiness for Transition of Care  Outcome: Ongoing, Progressing     Problem: Bleeding (Surgery Nonspecified)  Goal: Absence of Bleeding  Outcome: Ongoing, Progressing     Problem: Bowel Elimination Impaired (Surgery Nonspecified)  Goal: Effective Bowel Elimination  Outcome: Ongoing, Progressing  Intervention: Promote Effective Bowel Elimination  Recent Flowsheet Documentation  Taken 11/18/2021 0800 by Brandie Gillis RN  Bowel Elimination Promotion: adequate fluid intake promoted     Problem: Infection (Surgery Nonspecified)  Goal: Absence of Infection Signs and Symptoms  Outcome: Ongoing, Progressing     Problem: Ongoing Anesthesia Effects (Surgery Nonspecified)  Goal: Anesthesia/Sedation Recovery  Outcome: Ongoing, Progressing  Intervention: Optimize Anesthesia Recovery  Recent Flowsheet Documentation  Taken 11/18/2021 1000 by Brandie Gillis RN  Safety Promotion/Fall Prevention:   assistive device/personal items within reach   clutter free environment maintained   activity supervised   fall prevention program maintained   gait belt   lighting adjusted   mobility aid in reach   nonskid shoes/slippers when out of bed   room organization consistent   safety round/check completed  Taken 11/18/2021 0800 by Brandie Gillis RN  Safety Promotion/Fall Prevention:   activity supervised   assistive device/personal items within reach   clutter free environment maintained   fall prevention program maintained   gait belt   lighting adjusted   mobility aid in reach   nonskid shoes/slippers when out of bed   room organization consistent   safety round/check completed      Problem: Pain (Surgery Nonspecified)  Goal: Acceptable Pain Control  Outcome: Ongoing, Progressing  Intervention: Prevent or Manage Pain  Recent Flowsheet Documentation  Taken 11/18/2021 0800 by Brandie Gillis RN  Diversional Activities: television     Problem: Postoperative Nausea and Vomiting (Surgery Nonspecified)  Goal: Nausea and Vomiting Relief  Outcome: Ongoing, Progressing     Problem: Postoperative Urinary Retention (Surgery Nonspecified)  Goal: Effective Urinary Elimination  Outcome: Ongoing, Progressing     Problem: Fall Injury Risk  Goal: Absence of Fall and Fall-Related Injury  Outcome: Ongoing, Progressing  Intervention: Identify and Manage Contributors to Fall Injury Risk  Recent Flowsheet Documentation  Taken 11/18/2021 1000 by Brandie Gillis RN  Medication Review/Management:   medications reviewed   dosing adjusted  Taken 11/18/2021 0800 by Brandie Gillis RN  Medication Review/Management:   medications reviewed   dosing adjusted  Intervention: Promote Injury-Free Environment  Recent Flowsheet Documentation  Taken 11/18/2021 1000 by Brandie Gillis RN  Safety Promotion/Fall Prevention:   assistive device/personal items within reach   clutter free environment maintained   activity supervised   fall prevention program maintained   gait belt   lighting adjusted   mobility aid in reach   nonskid shoes/slippers when out of bed   room organization consistent   safety round/check completed  Taken 11/18/2021 0800 by Brandie Gillis RN  Safety Promotion/Fall Prevention:   activity supervised   assistive device/personal items within reach   clutter free environment maintained   fall prevention program maintained   gait belt   lighting adjusted   mobility aid in reach   nonskid shoes/slippers when out of bed   room organization consistent   safety round/check completed   Goal Outcome Evaluation:  Plan of Care Reviewed With: patient        Progress: improving  Outcome Summary: JOHNSON

## 2021-11-18 NOTE — PROGRESS NOTES
"          Orthopaedic Surgery Progress Note      LOS: 0 days   Patient Care Team:  Marina Cobb PA-C as PCP - General (Physician Assistant)  Margo Garg MD as Consulting Physician (Endocrinology)    POD 1    Subjective     Interval History:   Patient did well overnight.  He has walked with therapy.  Having some posterior and lateral knee pain.  Denies chest pain or shortness of breath.    Objective     Vital Signs:  Temp (24hrs), Av.7 °F (36.5 °C), Min:97 °F (36.1 °C), Max:99.4 °F (37.4 °C)    /63 (BP Location: Right arm, Patient Position: Lying)   Pulse 69   Temp 98.1 °F (36.7 °C) (Oral)   Resp 16   Ht 170.2 cm (67\")   Wt 96.8 kg (213 lb 6.4 oz)   SpO2 97%   BMI 33.42 kg/m²     Labs:  Lab Results (last 24 hours)     Procedure Component Value Units Date/Time    POC Glucose Once [[id number]]  (Normal) Collected: 21    Specimen: Blood Updated: 21     Glucose 94 mg/dL      Comment: Meter: [id number] : [id number] Pastor Shavon             Physical Exam:  EHL, FHL, gastroc soleus, and tibialis anterior are intact  Toes are pink and warm  Surgical dressing is in place  Palpable dorsalis pedis pulse  Calf is soft and nontender    Postoperative x-ray has been reviewed and looks acceptable    Assessment/Plan     Postoperative day number 1 status post left total knee arthroplasty.    Labs: CBC and BMP pending    Pain Control: Good overall    PT and OT     DVT prophylaxis: Full dose aspirin daily x6 weeks.  Begin this morning.    Discharge planning: Anticipate discharge home around lunchtime today.    Upon discharge, patient will need follow-up with me in 3 weeks' time.  They will need [facility] or [facility] for physical therapy until the Monday after .  At that time, outpatient PT will need to be initiated at [facility] in [facility].  I will go ahead and write a prescription for that PT today.  Please fax.  Standard Exofin Fusion " dressing care instructions.  Do not remove.  DME per case management.    Admission Status:  I believe this patient meets INPATIENT status due to the need for care which can only be reasonably provided in a hospital setting such as aggressive/expedited ancillary services and/or consultation services, the necessity for IV medications, close physician monitoring and/or the possible need for procedures.  In such, I feel patient’s risk for adverse outcomes and need for care warrant INPATIENT evaluation and predict the patient’s care encounter to likely last beyond 2 midnights.        Connor Anderson MD  11/18/21  07:39 EST

## 2021-11-18 NOTE — PLAN OF CARE
Goal Outcome Evaluation:  Plan of Care Reviewed With: patient        Progress: improving   VSS, RA, A/Ox4. Pt has ambulation multiple times, tolerated well. Complaints of pain treated with PRNs. IS encouraged. Currently resting in bed with no complaints. Will continue plan of care.

## 2021-11-18 NOTE — THERAPY DISCHARGE NOTE
Patient Name: Ashok Chung  : 1955    MRN: [id number]                              Today's Date: 2021       Admit Date: 2021    Visit Dx:     ICD-10-CM ICD-9-CM   1. Status post total left knee replacement  Z96.652 V43.65   2. Primary osteoarthritis of left knee  M17.12 715.16     Patient Active Problem List   Diagnosis   • OA (osteoarthritis) of knee   • Status post total left knee replacement   • Hypertension   • Postoperative pain     Past Medical History:   Diagnosis Date   • Arthritis    • Elevated cholesterol    • Hearing loss    • History of COVID-19 2021    blood test showed positive antibodies in 2021 (no symptoms)   • History of staph infection 2004    Lost two vertebrae as a result of staph infection   • Hypercalcemia    • Hypertension    • Wears glasses      Past Surgical History:   Procedure Laterality Date   • APPENDECTOMY     • COLONOSCOPY     • HERNIA REPAIR     • KNEE ARTHROSCOPY Left    • KNEE SURGERY     • SPINE SURGERY     • TONSILLECTOMY        General Information     Row Name 21 1005          Physical Therapy Time and Intention    Document Type therapy note (daily note); discharge treatment; discharge evaluation/summary  -LR     Mode of Treatment physical therapy; individual therapy  -LR     Row Name 21 1005          General Information    Patient Profile Reviewed yes  -LR     Existing Precautions/Restrictions fall; other (see comments)  L adductor canal nerve catheter  -LR     Barriers to Rehab previous functional deficit  -LR     Row Name 21 1005          Home Main Entrance    Number of Stairs, Main Entrance three  -LR     Stair Railings, Main Entrance other (see comments)  can use bookself on R descending for UE support  -LR     Row Name 21 1005          Cognition    Orientation Status (Cognition) oriented x 4  -LR     Row Name 21 1005          Safety Issues, Functional Mobility    Safety Issues Affecting Function (Mobility)  positioning of assistive device; safety precautions follow-through/compliance; safety precaution awareness  -LR     Impairments Affecting Function (Mobility) strength; pain; range of motion (ROM); endurance/activity tolerance  -LR           User Key  (r) = Recorded By, (t) = Taken By, (c) = Cosigned By    Initials Name Provider Type    Michelle Cook, PT Physical Therapist               Mobility     Row Name 11/18/21 1005          Bed Mobility    Supine-Sit [place] (Bed Mobility) not tested  -LR     Sit-Supine [place] (Bed Mobility) not tested  -LR     Comment (Bed Mobility) [facility] on arrival and at end of treatment.  -LR     Row Name 11/18/21 1005          Transfers    Comment (Transfers) Verbal cues for correct hand placement with t/f and to step L LE out before t/f for comfort. Patient did not reach back for chair despite cues to do so when sitting.  -LR     Row Name 11/18/21 1005          Bed-Chair Transfer    Bed-Chair [place] (Transfers) not tested  -LR     Row Name 11/18/21 1005          Sit-Stand Transfer    Sit-Stand [place] (Transfers) verbal cues; standby assist  -LR     Assistive Device (Sit-Stand Transfers) walker, front-wheeled  -LR     Row Name 11/18/21 1005          Gait/Stairs (Locomotion)    [place] Level (Gait) verbal cues; contact guard  -LR     Assistive Device (Gait) walker, front-wheeled  -LR     Distance in Feet (Gait) 450  -LR     Deviations/Abnormal Patterns (Gait) bilateral deviations; leisa decreased; gait speed decreased; stride length decreased; left sided deviations; antalgic  -LR     Bilateral Gait Deviations forward flexed posture; heel strike decreased  -LR     Left Sided Gait Deviations weight shift ability decreased  -LR     [place] Level (Stairs) verbal cues; contact guard  -LR     Assistive Device (Stairs) cane, straight  -LR     Handrail Location (Stairs) left side (descending); other (see comments)  HHA on L when ascending  -LR      Number of Steps (Stairs) 3  -LR     Ascending Technique (Stairs) step-to-step  -LR     Descending Technique (Stairs) step-to-step  -LR     Comment (Gait/Stairs) Patient ambulated with step through gait pattern at slow pace. Verbal cues for correct sequencing of steps, increased L LE weight bearing/stance phase, decreased UE weight bearing, increased L knee flexion during swing phase. Improved with cues for correction. Cues for equal step length. Cues to shift hips forward and shoulders back for upright posture, improved with cues. Two instances of moderate antalgia d/t pain in posterior knee. Gait limited by fatigue and pain. Verbal cues to climb stairs one at a time and to step up with strong LE first and down with weak LE first. Cues for correct placement and sequencing of SPC with stairs. Had difficulty with clearing L foot when ascending, used HHA on L and SPC on R when ascending. Used handrail on L and SPC on R when descending to simulate bookcase for support at home. No LOB or unsteadiness with stairs.  -LR     Row Name 11/18/21 1005          Mobility    Extremity Weight-bearing Status left lower extremity  -LR     Left Lower Extremity (Weight-bearing Status) weight-bearing as tolerated (WBAT)  -LR           User Key  (r) = Recorded By, (t) = Taken By, (c) = Cosigned By    Initials Name Provider Type    LR Michelle Antonio, PT Physical Therapist               Obj/Interventions     Row Name 11/18/21 1005          Motor Skills    Therapeutic Exercise ankle; knee; other (see comments)  cues for technique; min overpressure required with LAQ and SAQ for full extension; had patient perform 2 reps of standing calf raises to ensure correct technique, deferred further reps d/t significant posterior knee pain  -LR     Row Name 11/18/21 1005          Knee (Therapeutic Exercise)    Knee (Therapeutic Exercise) AROM (active range of motion); strengthening exercise; isometric exercises  -LR     Knee AROM (Therapeutic  Exercise) left; heel slides; sitting; 10 repetitions; 5 repetitions  and reclined  -LR     Knee Isometrics (Therapeutic Exercise) left; quad sets; sitting; 10 repetitions; 5 repetitions  -LR     Knee Strengthening (Therapeutic Exercise) left; SLR (straight leg raise); SAQ (short arc quad); LAQ (long arc quad); sitting; 10 repetitions; 5 repetitions  -LR     Row Name 11/18/21 1005          Ankle (Therapeutic Exercise)    Ankle (Therapeutic Exercise) AROM (active range of motion)  -LR     Ankle AROM (Therapeutic Exercise) bilateral; dorsiflexion; plantarflexion; sitting; 10 repetitions; 5 repetitions  -LR     Row Name 11/18/21 1005          Balance    Balance Assessment sitting static balance; sitting dynamic balance; standing static balance; standing dynamic balance  -LR     Static Sitting Balance WFL; unsupported; sitting in chair  -LR     Dynamic Sitting Balance WFL; unsupported; sitting in chair  -LR     Static Standing Balance WFL; supported; standing  -LR     Dynamic Standing Balance WFL; supported; standing  -LR     Row Name 11/18/21 1005          General Lower Extremity Assessment (Range of Motion)    Comment: Lower Extremity ROM 17-70 degrees L knee ROM; lacking 17 degrees extension AROM, 70 degrees flexion AAROM in sitting  -LR           User Key  (r) = Recorded By, (t) = Taken By, (c) = Cosigned By    Initials Name Provider Type    LR Michelle Antonio, PT Physical Therapist               Goals/Plan     Row Name 11/18/21 1005          Bed Mobility Goal 1 (PT)    Activity/Assistive Device (Bed Mobility Goal 1, PT) sit to supine/supine to sit  -LR     [place] Level/Cues Needed (Bed Mobility Goal 1, PT) modified independence  -LR     Time Frame (Bed Mobility Goal 1, PT) long term goal (LTG); 3 days  -LR     Progress/Outcomes (Bed Mobility Goal 1, PT) goal not met; discharged from facility  -LR     Row Name 11/18/21 1005          Transfer Goal 1 (PT)    Activity/Assistive Device (Transfer Goal 1, PT)  sit-to-stand/stand-to-sit; walker, rolling  -LR     [place] Level/Cues Needed (Transfer Goal 1, PT) modified independence  -LR     Time Frame (Transfer Goal 1, PT) long term goal (LTG); 3 days  -LR     Progress/Outcome (Transfer Goal 1, PT) good progress toward goal; goal not met; discharged from facility  -     Row Name 11/18/21 1005          Gait Training Goal 1 (PT)    Activity/Assistive Device (Gait Training Goal 1, PT) gait (walking locomotion); walker, rolling  -LR     [place] Level (Gait Training Goal 1, PT) modified independence  -LR     Distance (Gait Training Goal 1, PT) 300 feet  -LR     Time Frame (Gait Training Goal 1, PT) long term goal (LTG); 3 days  -LR     Progress/Outcome (Gait Training Goal 1, PT) good progress toward goal; goal partially met; discharged from facility  -     Row Name 11/18/21 1005          ROM Goal 1 (PT)    ROM Goal 1 (PT) L knee AROM 0-90 degrees  -LR     Time Frame (ROM Goal 1, PT) long-term goal (LTG); 3 days  -LR     Progress/Outcome (ROM Goal 1, PT) discharged from facility; continuing progress toward goal; goal not met  -     Row Name 11/18/21 1005          Stairs Goal 1 (PT)    Activity/Assistive Device (Stairs Goal 1, PT) stairs, all skills; cane, straight  -LR     [place] Level/Cues Needed (Stairs Goal 1, PT) contact guard assist  -LR     Number of Stairs (Stairs Goal 1, PT) 3  -LR     Time Frame (Stairs Goal 1, PT) long term goal (LTG); 3 days  -LR     Progress/Outcome (Stairs Goal 1, PT) goal met  -LR           User Key  (r) = Recorded By, (t) = Taken By, (c) = Cosigned By    Initials Name Provider Type    LR Michelle Antonio, PT Physical Therapist               Clinical Impression     Row Name 11/18/21 1005          Pain    Additional Documentation Pain Scale: Numbers Pre/Post-Treatment (Group)  -LR     Row Name 11/18/21 1005          Pain Scale: Numbers Pre/Post-Treatment    Pretreatment Pain Rating 6/10  -LR     Posttreatment Pain Rating  7/10  -LR     Pain Location - Side Left  -LR     Pain Location - Orientation posterior  -LR     Pain Location knee  -LR     Pain Intervention(s) Ambulation/increased activity; Repositioned  -LR     Row Name 11/18/21 1005          Plan of Care Review    Plan of Care Reviewed With patient  -LR     Progress improving  -LR     Outcome Summary Patient ambulated 450 feet with RW and step through gait pattern, CGA, limited by fatigue and pain. ROM limited by pain and stiffness, L knee lacking 17 degrees extension AROM. L knee flexion AAROM 70 degrees in sitting. Patient climbed 3 steps with HHA and SPC with CGA, had difficulty clearing L foot. Descended 3 steps with SPC and one handrail to simulate stairs at home. Patient has been d/c home with family and HHPT.  -LR     Row Name 11/18/21 1005          Therapy Assessment/Plan (PT)    Rehab Potential (PT) good, to achieve stated therapy goals  -LR     Criteria for Skilled Interventions Met (PT) yes; meets criteria; skilled treatment is necessary  -LR     Row Name 11/18/21 1005          Positioning and Restraints    Pre-Treatment Position sitting in chair/recliner  -LR     Post Treatment Position chair  -LR     In Chair notified nsg; reclined; sitting; call light within reach; encouraged to call for assist; exit alarm on; legs elevated  -LR           User Key  (r) = Recorded By, (t) = Taken By, (c) = Cosigned By    Initials Name Provider Type    LR Michelle Antonio, PT Physical Therapist               Outcome Measures     Row Name 11/18/21 1005 11/18/21 0800       How much help from another person do you currently need...    Turning from your back to your side while in flat bed without using bedrails? 4  -LR 4  -EH    Moving from lying on back to sitting on the side of a flat bed without bedrails? 4  -LR 4  -EH    Moving to and from a bed to a chair (including a wheelchair)? 3  -LR 3  -EH    Standing up from a chair using your arms (e.g., wheelchair, bedside chair)? 3   -LR 3  -EH    Climbing 3-5 steps with a railing? 3  -LR 1  -EH    To walk in hospital room? 3  -LR 2  -EH    AM-PAC 6 Clicks Score (PT) 20  -LR 17  -EH    Row Name 11/18/21 1005          Functional Assessment    Outcome Measure Options AM-PAC 6 Clicks Basic Mobility (PT)  -LR           User Key  (r) = Recorded By, (t) = Taken By, (c) = Cosigned By    Initials Name Provider Type    Michelle Cook, PT Physical Therapist     Brandie Gillis, RN Registered Nurse              Physical Therapy Education                 Title: PT OT SLP Therapies (Done)     Topic: Physical Therapy (Done)     Point: Mobility training (Done)     Learning Progress Summary           Patient Acceptance, E,D,H, VU,DU by STANFORD at 11/18/2021 1005    Comment: Issued and reviewed written/illustrated HEP. Educated on precautions, weight bearing status, correct sit<->stand t/f technique, correct gait mechanics, safety with mobility, correct stair training technique, correct car t/f technique.    Acceptance, D,E, VU by TATIANA at 11/17/2021 1555    Comment: Educated on safe sequencing with bed mobility, ambulatory transfers, gait. Reviewed HEP and knee precautions.                   Point: Home exercise program (Done)     Learning Progress Summary           Patient Acceptance, E,D,H, VU,DU by STANFORD at 11/18/2021 1005    Comment: Issued and reviewed written/illustrated HEP. Educated on precautions, weight bearing status, correct sit<->stand t/f technique, correct gait mechanics, safety with mobility, correct stair training technique, correct car t/f technique.    Acceptance, D,E, VU by TATIANA at 11/17/2021 1555    Comment: Educated on safe sequencing with bed mobility, ambulatory transfers, gait. Reviewed HEP and knee precautions.                   Point: Body mechanics (Done)     Learning Progress Summary           Patient Acceptance, E,D,H, VU,DU by STANFORD at 11/18/2021 1005    Comment: Issued and reviewed written/illustrated HEP. Educated on precautions,  weight bearing status, correct sit<->stand t/f technique, correct gait mechanics, safety with mobility, correct stair training technique, correct car t/f technique.    Acceptance, D,E, VU by TATIANA at 11/17/2021 1555    Comment: Educated on safe sequencing with bed mobility, ambulatory transfers, gait. Reviewed HEP and knee precautions.                   Point: Precautions (Done)     Learning Progress Summary           Patient Acceptance, E,D,H, VU,DU by LR at 11/18/2021 1005    Comment: Issued and reviewed written/illustrated HEP. Educated on precautions, weight bearing status, correct sit<->stand t/f technique, correct gait mechanics, safety with mobility, correct stair training technique, correct car t/f technique.    Acceptance, D,E, VU by TATIANA at 11/17/2021 1555    Comment: Educated on safe sequencing with bed mobility, ambulatory transfers, gait. Reviewed HEP and knee precautions.                               User Key     Initials Effective Dates Name Provider Type Discipline     06/16/21 -  Michelle Antonio, PT Physical Therapist PT    TATIANA 06/16/21 -  Ector Hernandez, PT Physical Therapist PT              PT Recommendation and Plan     Plan of Care Reviewed With: patient  Progress: improving  Outcome Summary: Patient ambulated 450 feet with RW and step through gait pattern, CGA, limited by fatigue and pain. ROM limited by pain and stiffness, L knee lacking 17 degrees extension AROM. L knee flexion AAROM 70 degrees in sitting. Patient climbed 3 steps with HHA and SPC with CGA, had difficulty clearing L foot. Descended 3 steps with SPC and one handrail to simulate stairs at home. Patient has been d/c home with family and HHPT.     Time Calculation:    PT Charges     Row Name 11/18/21 1005             Time Calculation    Start Time 1005  -LR      PT Received On 11/18/21  -      PT Goal Re-Cert Due Date 11/27/21  -              Timed Charges    38423 - PT Therapeutic Exercise Minutes 12  -LR      70417 - Gait  Training Minutes  13  -LR              Total Minutes    Timed Charges Total Minutes 25  -LR       Total Minutes 25  -LR            User Key  (r) = Recorded By, (t) = Taken By, (c) = Cosigned By    Initials Name Provider Type    Michelle Cook, PT Physical Therapist              Therapy Charges for Today     Code Description Service Date Service Provider Modifiers Qty    [id number]  PT THER PROC EA 15 MIN 11/18/2021 Michelle Antonio, PT GP 1    [id number] HC GAIT TRAINING EA 15 MIN 11/18/2021 Michelle Antonio, PT GP 1          PT G-Codes  Outcome Measure Options: AM-PAC 6 Clicks Basic Mobility (PT)  AM-PAC 6 Clicks Score (PT): 20    PT Discharge Summary  Anticipated Discharge Disposition (PT): home with assist, home with home health  Reason for Discharge: Discharge from facility  Outcomes Achieved: Patient able to partially acheive established goals  Discharge Destination: Home with assist, Home with home health    Michelle Antonio, PT  11/18/2021

## 2021-11-18 NOTE — PROGRESS NOTES
[facility]    Acute pain service Inpatient Progress Note    Patient Name: Ashok Chung  :  1955  MRN:  [id number]        Acute Pain  Service Inpatient Progress Note:    Analgesia:Good  Pain Score:4/10  LOC: alert and awake  Resp Status: room air  Cardiac: VS stable  Side Effects:None  Catheter Site:clean, dressing intact and dry  Cath type: peripheral nerve cath with ON Q  Infusion rate: 10ml/hr  Catheter Plan:Catheter to remain Insitu and Continue catheter infusion rate unchanged  Comments: Anterior = 0  Posterior = 4

## 2021-11-19 ENCOUNTER — TELEPHONE (OUTPATIENT)
Dept: ORTHOPEDIC SURGERY | Facility: CLINIC | Age: 66
End: 2021-11-19

## 2021-11-19 DIAGNOSIS — Z96.652 STATUS POST TOTAL LEFT KNEE REPLACEMENT: Primary | ICD-10-CM

## 2021-11-19 NOTE — TELEPHONE ENCOUNTER
Hub staff attempted to follow warm transfer process and was unsuccessful     Caller: JANE MUNIZ    Relationship to patient: SELF    Best call back number: [phone]    Patient is needing: PT IS CALLING IN REGARDS TO HOME HEALTH FOR THIS WEEK AND NEXT WEEK BEFORE GOING TO OUTPATIENT PHYSICAL THERAPY, BUT HE HAS NOT HEARD ANYTHING ABOUT IT. [facility] IN [place] IS WHERE HE WOULD PREFER TO GO. PLEASE CALL HIM BACK AT THE NUMBER ABOVE.

## 2021-11-19 NOTE — TELEPHONE ENCOUNTER
Home health PT ordered--Per Dr. Anderson's last note he only wanted home health PT for 1 to 2 weeks followed by outpatient PT.

## 2021-11-19 NOTE — TELEPHONE ENCOUNTER
LVM with pt letting him know I had faxed over Home Health order for [facility] at [phone] (along with last office note, op note, discharge summary, and demographics as requested by [facility]). Told him to call back if he has any other questions.    Mu

## 2021-11-19 NOTE — PROGRESS NOTES
[facility]    Nerve Cath Post Op Call    Patient Name: Ashok Chung  :  1955  MRN:  [id number]  Date of Discharge: 2021    Nerve Cath Post Op Call:    Analgesia:Fair  Pain Score:4/10  Side Effects:None  Catheter Site:clean  Patient Controlled ON Q pump infusion rate: 10ml/hr  Catheter Plan:Will continue with plan at home without changes and The patient was instructed to call ON CALL Anesthesia provider for any questions or problems  Patient/Family instructed to call ON CALL anesthesia provider for any questions or problems.  Patient Follow Up:      Pt prefers to be called to his cell phone number at: [phone]

## 2021-11-21 NOTE — PROGRESS NOTES
[facility]    Nerve Cath Post Op Call    Patient Name: Ashok Chung  :  1955  MRN:  [id number]  Date of Discharge: 2021    Nerve Cath Post Op Call:    Catheter Plan:Patient/Family member report nerve catheter previously discontinued, tip intact  Patient/Family instructed to call ON CALL anesthesia provider for any questions or problems.  Patient Follow Up:  Patient provided the educational video about the nerve catheter and pain pump.  Video adequately prepared patient to manage pain pump at home.  Tylenol use: acetaminophen use

## 2021-11-24 DIAGNOSIS — Z96.652 STATUS POST TOTAL LEFT KNEE REPLACEMENT: ICD-10-CM

## 2021-11-24 RX ORDER — OXYCODONE HYDROCHLORIDE 5 MG/1
5 TABLET ORAL EVERY 6 HOURS PRN
Qty: 30 TABLET | Refills: 0 | Status: SHIPPED | OUTPATIENT
Start: 2021-11-24 | End: 2021-12-02

## 2021-11-24 NOTE — TELEPHONE ENCOUNTER
Patient called about his script of Oxycodone.  He is now in therapy and it causes him more pain and he stated he is running at about a 7 constantly on the pain scale, so he has been using his medication more... he has about 12 pills left now.  He is asking for a refill of the medication to be sent to [facility] in [place].  He is also asking if taking ibuprofen with the oxy would be acceptable or safe for him.  He is asking for a call to be advised on this matter.

## 2021-12-02 ENCOUNTER — HOSPITAL ENCOUNTER (OUTPATIENT)
Dept: CARDIOLOGY | Facility: HOSPITAL | Age: 66
Discharge: HOME | End: 2021-12-02
Admitting: ORTHOPAEDIC SURGERY

## 2021-12-02 ENCOUNTER — OFFICE VISIT (OUTPATIENT)
Dept: ORTHOPEDIC SURGERY | Facility: CLINIC | Age: 66
End: 2021-12-02

## 2021-12-02 DIAGNOSIS — M79.89 PAIN AND SWELLING OF LEFT LOWER LEG: ICD-10-CM

## 2021-12-02 DIAGNOSIS — M79.662 PAIN AND SWELLING OF LEFT LOWER LEG: ICD-10-CM

## 2021-12-02 DIAGNOSIS — Z96.652 STATUS POST TOTAL LEFT KNEE REPLACEMENT: ICD-10-CM

## 2021-12-02 DIAGNOSIS — Z96.652 STATUS POST TOTAL LEFT KNEE REPLACEMENT: Primary | ICD-10-CM

## 2021-12-02 LAB
BH CV LOWER VASCULAR LEFT COMMON FEMORAL AUGMENT: NORMAL
BH CV LOWER VASCULAR LEFT COMMON FEMORAL COMPETENT: NORMAL
BH CV LOWER VASCULAR LEFT COMMON FEMORAL COMPRESS: NORMAL
BH CV LOWER VASCULAR LEFT COMMON FEMORAL PHASIC: NORMAL
BH CV LOWER VASCULAR LEFT COMMON FEMORAL SPONT: NORMAL
BH CV LOWER VASCULAR LEFT DISTAL FEMORAL AUGMENT: NORMAL
BH CV LOWER VASCULAR LEFT DISTAL FEMORAL COMPETENT: NORMAL
BH CV LOWER VASCULAR LEFT DISTAL FEMORAL COMPRESS: NORMAL
BH CV LOWER VASCULAR LEFT DISTAL FEMORAL PHASIC: NORMAL
BH CV LOWER VASCULAR LEFT DISTAL FEMORAL SPONT: NORMAL
BH CV LOWER VASCULAR LEFT GASTRONEMIUS COMPRESS: NORMAL
BH CV LOWER VASCULAR LEFT GREATER SAPH AK COMPRESS: NORMAL
BH CV LOWER VASCULAR LEFT GREATER SAPH BK COMPRESS: NORMAL
BH CV LOWER VASCULAR LEFT LESSER SAPH COMPRESS: NORMAL
BH CV LOWER VASCULAR LEFT MID FEMORAL AUGMENT: NORMAL
BH CV LOWER VASCULAR LEFT MID FEMORAL COMPETENT: NORMAL
BH CV LOWER VASCULAR LEFT MID FEMORAL COMPRESS: NORMAL
BH CV LOWER VASCULAR LEFT MID FEMORAL PHASIC: NORMAL
BH CV LOWER VASCULAR LEFT MID FEMORAL SPONT: NORMAL
BH CV LOWER VASCULAR LEFT PERONEAL AUGMENT: NORMAL
BH CV LOWER VASCULAR LEFT PERONEAL COMPRESS: NORMAL
BH CV LOWER VASCULAR LEFT POPLITEAL AUGMENT: NORMAL
BH CV LOWER VASCULAR LEFT POPLITEAL COMPETENT: NORMAL
BH CV LOWER VASCULAR LEFT POPLITEAL COMPRESS: NORMAL
BH CV LOWER VASCULAR LEFT POPLITEAL PHASIC: NORMAL
BH CV LOWER VASCULAR LEFT POPLITEAL SPONT: NORMAL
BH CV LOWER VASCULAR LEFT POSTERIOR TIBIAL AUGMENT: NORMAL
BH CV LOWER VASCULAR LEFT POSTERIOR TIBIAL COMPRESS: NORMAL
BH CV LOWER VASCULAR LEFT PROFUNDA FEMORAL AUGMENT: NORMAL
BH CV LOWER VASCULAR LEFT PROFUNDA FEMORAL COMPETENT: NORMAL
BH CV LOWER VASCULAR LEFT PROFUNDA FEMORAL COMPRESS: NORMAL
BH CV LOWER VASCULAR LEFT PROFUNDA FEMORAL PHASIC: NORMAL
BH CV LOWER VASCULAR LEFT PROFUNDA FEMORAL SPONT: NORMAL
BH CV LOWER VASCULAR LEFT PROXIMAL FEMORAL AUGMENT: NORMAL
BH CV LOWER VASCULAR LEFT PROXIMAL FEMORAL COMPETENT: NORMAL
BH CV LOWER VASCULAR LEFT PROXIMAL FEMORAL COMPRESS: NORMAL
BH CV LOWER VASCULAR LEFT PROXIMAL FEMORAL PHASIC: NORMAL
BH CV LOWER VASCULAR LEFT PROXIMAL FEMORAL SPONT: NORMAL
BH CV LOWER VASCULAR LEFT SAPHENOFEMORAL JUNCTION AUGMENT: NORMAL
BH CV LOWER VASCULAR LEFT SAPHENOFEMORAL JUNCTION COMPETENT: NORMAL
BH CV LOWER VASCULAR LEFT SAPHENOFEMORAL JUNCTION COMPRESS: NORMAL
BH CV LOWER VASCULAR LEFT SAPHENOFEMORAL JUNCTION PHASIC: NORMAL
BH CV LOWER VASCULAR LEFT SAPHENOFEMORAL JUNCTION SPONT: NORMAL
BH CV LOWER VASCULAR RIGHT COMMON FEMORAL AUGMENT: NORMAL
BH CV LOWER VASCULAR RIGHT COMMON FEMORAL COMPETENT: NORMAL
BH CV LOWER VASCULAR RIGHT COMMON FEMORAL COMPRESS: NORMAL
BH CV LOWER VASCULAR RIGHT COMMON FEMORAL PHASIC: NORMAL
BH CV LOWER VASCULAR RIGHT COMMON FEMORAL SPONT: NORMAL
MAXIMAL PREDICTED HEART RATE: 152 BPM
STRESS TARGET HR: 129 BPM

## 2021-12-02 PROCEDURE — 99024 POSTOP FOLLOW-UP VISIT: CPT | Performed by: ORTHOPAEDIC SURGERY

## 2021-12-02 PROCEDURE — 93971 EXTREMITY STUDY: CPT

## 2021-12-02 RX ORDER — HYDROCODONE BITARTRATE AND ACETAMINOPHEN 10; 325 MG/1; MG/1
1 TABLET ORAL EVERY 6 HOURS PRN
Qty: 40 TABLET | Refills: 0 | Status: SHIPPED | OUTPATIENT
Start: 2021-12-02 | End: 2021-12-10 | Stop reason: SDUPTHER

## 2021-12-02 NOTE — PROGRESS NOTES
[facility] Note        Subjective     CC: Post-op (2 weeks s/p (L) TKA 11/17/2021)      HPI    Ashok Chung is a 66 y.o. male.  Patient is now 2 weeks out from left total knee arthroplasty 11/17/2021.  He denies chest pain or shortness of breath.  He denies fever or chills as well.  He is having quite a bit of pain in his leg.  He is having some bruising.  He says that physical therapy in the hospital was suboptimal and did not provide any equipment or significant guidance.  He did stay 23 hours.  He told me he was not walked aggressively.  Furthermore, home health PT was not set up to begin expediently after surgery.  His wife tells me that home health PT was not set up at all.    Overall, patient's symptoms are as above.    ROS:    Constiutional:Pt denies fever, chills, nausea, or vomiting.  MSK:as above        Objective      Past Medical History  Past Medical History:   Diagnosis Date   • Arthritis    • Elevated cholesterol    • Hearing loss    • History of COVID-19 02/2021    blood test showed positive antibodies in Feb 2021 (no symptoms)   • History of staph infection 2004    Lost two vertebrae as a result of staph infection   • Hypercalcemia    • Hypertension    • Wears glasses          Physical Exam  There were no vitals taken for this visit.    There is no height or weight on file to calculate BMI.    Patient is well nourished and well developed.        Ortho Exam  Incision is healing and free of erythema or drainage  Exofin Fusion is still highly adherent  Calf is firm and tender  Range of motion 10-80    Imaging/Labs/EMG Reviewed:  Imaging Results (Last 24 Hours)     Procedure Component Value Units Date/Time    XR Knee 3+ View With [place] Left [[id number]] Resulted: 12/02/21 1249     Updated: 12/02/21 1259    Narrative:      Knee X-ray    Indication: status-post TKA    Study:  AP, Lateral, and Sunrise views of Left knee    Comparison: Left knee 11/17/2021    Findings:  No signs  of acute fracture are visualized  No signs of loosening are appreciated  Components are well aligned    Impression:  Status post Left total knee arthroplasty. No signs of   loosening or fracture.                Assessment    Assessment:  1. Status post total left knee replacement    2. Pain and swelling of left lower leg        Plan:  1. Recommend over the counter anti-inflammatories for pain and/or swelling  2. Status post left total knee arthroplasty with pain and swelling in the left leg--patient not getting much relief from the oxycodone.  Plan will be to switch him to hydrocodone.  I told him that venous duplex will be ordered to rule out DVT.  In the absence of DVT, he can continue to use Tylenol and ibuprofen in appropriate amounts as well as the hydrocodone prescription that will be written today.  In the presence of a DVT, we will need to stop any anti-inflammatories and he needs to continue with his PT and work on range of motion is much as possible.  [facility] [place].      Connor Anderson MD  12/02/21  13:24 EST      Dictated Utilizing Dragon Dictation.

## 2021-12-10 DIAGNOSIS — M79.662 PAIN AND SWELLING OF LEFT LOWER LEG: ICD-10-CM

## 2021-12-10 DIAGNOSIS — M79.89 PAIN AND SWELLING OF LEFT LOWER LEG: ICD-10-CM

## 2021-12-10 DIAGNOSIS — Z96.652 STATUS POST TOTAL LEFT KNEE REPLACEMENT: ICD-10-CM

## 2021-12-10 RX ORDER — HYDROCODONE BITARTRATE AND ACETAMINOPHEN 10; 325 MG/1; MG/1
1 TABLET ORAL EVERY 6 HOURS PRN
Qty: 40 TABLET | Refills: 0 | Status: SHIPPED | OUTPATIENT
Start: 2021-12-10 | End: 2021-12-21 | Stop reason: SDUPTHER

## 2021-12-10 NOTE — TELEPHONE ENCOUNTER
Incoming Refill Request      Medication requested (name and dose): HYDROCODONE    Pharmacy where request should be sent: [facility]     Additional details provided by patient: PATIENT STATED HE IS IN EXTREME PAIN FROM PHYSICAL THERAPY. HIS THERAPIST SUGGEST HE REQUEST MORE PAIN MEDICATION FOR PHYSICAL THERAPY    Best call back number: [phone]    Does the patient have less than a 3 day supply:  [x] Yes  [] No    Ivett Gore  12/10/21, 12:49 EST

## 2021-12-10 NOTE — TELEPHONE ENCOUNTER
I called and left a voicemail with Mr. Chung to let him know that I sent the message on to Dr. Anderson about the pain medication and that he is out of the office today so it may not be until Monday before he gets back with me. I said in the voicemail that if Dr. Anderson gets back with me today I will give him a call back.    January

## 2021-12-21 ENCOUNTER — OFFICE VISIT (OUTPATIENT)
Dept: ORTHOPEDIC SURGERY | Facility: CLINIC | Age: 66
End: 2021-12-21

## 2021-12-21 DIAGNOSIS — M79.662 PAIN AND SWELLING OF LEFT LOWER LEG: ICD-10-CM

## 2021-12-21 DIAGNOSIS — Z96.652 STATUS POST TOTAL LEFT KNEE REPLACEMENT: Primary | ICD-10-CM

## 2021-12-21 DIAGNOSIS — M79.89 PAIN AND SWELLING OF LEFT LOWER LEG: ICD-10-CM

## 2021-12-21 PROCEDURE — 99024 POSTOP FOLLOW-UP VISIT: CPT | Performed by: ORTHOPAEDIC SURGERY

## 2021-12-21 RX ORDER — HYDROCODONE BITARTRATE AND ACETAMINOPHEN 10; 325 MG/1; MG/1
1 TABLET ORAL EVERY 6 HOURS PRN
Qty: 40 TABLET | Refills: 0 | Status: SHIPPED | OUTPATIENT
Start: 2021-12-21 | End: 2022-01-20

## 2021-12-21 NOTE — PROGRESS NOTES
[facility] [place] [facility] [place] [facility] [place]        Subjective     Post-op (2 week recheck- 4 weeks s/p (L) TKA 11/17/2021)       HPI    Ashok Chung is a 66 y.o. male. Patient is 4-1/2 weeks out from left total knee arthroplasty 11/17/2021. He is doing okay overall. He has gotten a lot better and last 10 days his wife tells me. Denies chest pain or shortness of breath. Still using a walker. He has had several tearful nights because of some depression and frustration over his knee. Denies chest pain or shortness of breath.          Objective      Physical Exam  There were no vitals taken for this visit.    There is no height or weight on file to calculate BMI.        Ortho Exam  Range of motion is . Incision is healing and free of erythema or drainage. Calf is soft and nontender.    Imaging Reviewed:  Imaging Results (Last 24 Hours)     ** No results found for the last 24 hours. **            Assessment    Assessment:  1. Status post total left knee replacement    2. Pain and swelling of left lower leg        Plan:  1. Status post left total knee arthroplasty--- at this point, patient I believe is in the process of turning the corner. He is doing better. He should continue PT 2 to 3 days a week. I have challenged him to focus on his terminal extension more than his flexion. I believe his flexion will come with time given how well its come along already. I will refill his pain medication. See him back in about 4 to 6 weeks. We talked him about the 10 pound bag of rice trick to help with terminal extension. I will give him the greenlight to drive at this point and would like for him to be off narcotics when he drives obviously. Furthermore, I have given him the greenlight to travel and to go see his friends in town.      Connor Anderson MD  12/21/21  12:29 EST      Dictated Utilizing Dragon Dictation.

## 2022-01-20 ENCOUNTER — OFFICE VISIT (OUTPATIENT)
Dept: ORTHOPEDIC SURGERY | Facility: CLINIC | Age: 67
End: 2022-01-20

## 2022-01-20 DIAGNOSIS — M79.89 PAIN AND SWELLING OF LEFT LOWER LEG: ICD-10-CM

## 2022-01-20 DIAGNOSIS — Z96.652 STATUS POST TOTAL LEFT KNEE REPLACEMENT: Primary | ICD-10-CM

## 2022-01-20 DIAGNOSIS — M79.662 PAIN AND SWELLING OF LEFT LOWER LEG: ICD-10-CM

## 2022-01-20 PROCEDURE — 99024 POSTOP FOLLOW-UP VISIT: CPT | Performed by: ORTHOPAEDIC SURGERY

## 2022-01-20 NOTE — PROGRESS NOTES
[facility] – [place] [facility] Clinic Note        Subjective     Post-op (6 week recheck- 9 weeks s/p (L) TKA 11/17/2021)       IVAN Chung is a 66 y.o. male.  Patient is here now 9 weeks out from left total knee arthroplasty 11/17/2021.  He is doing his physical therapy at [facility] with Brenda and doing quite well overall.  He is very complementary of her care.  He is doing much better overall than when I saw him about a month ago when he was virtually in tears.          Objective      Physical Exam  There were no vitals taken for this visit.    There is no height or weight on file to calculate BMI.        Ortho Exam  Range of motion 0-1 20  Incision healed and free of erythema or drainage  Calf soft and nontender    Imaging Reviewed:  Imaging Results (Last 24 Hours)     ** No results found for the last 24 hours. **            Assessment    Assessment:  1. Status post total left knee replacement    2. Pain and swelling of left lower leg        Plan:  1. Status post left total knee arthroplasty--patient is doing much better overall.  He has turned the proverbial corner.  I agree with Brenda's assessment that he should go ahead and get this knee as strong as possible until he considers arthroplasty on the contralateral side.  I will see him back in 4 months.  Continue indefinite antibiotic prophylaxis.      Connor Anderson MD  01/20/22  10:59 EST      Dictated Utilizing Dragon Dictation.

## 2022-05-24 ENCOUNTER — TELEPHONE (OUTPATIENT)
Dept: ENDOCRINOLOGY | Facility: CLINIC | Age: 67
End: 2022-05-24

## 2022-05-24 ENCOUNTER — OFFICE VISIT (OUTPATIENT)
Dept: ORTHOPEDIC SURGERY | Facility: CLINIC | Age: 67
End: 2022-05-24

## 2022-05-24 VITALS
SYSTOLIC BLOOD PRESSURE: 142 MMHG | WEIGHT: 213 LBS | HEIGHT: 67 IN | BODY MASS INDEX: 33.43 KG/M2 | DIASTOLIC BLOOD PRESSURE: 88 MMHG

## 2022-05-24 DIAGNOSIS — E83.52 HYPERCALCEMIA: Primary | ICD-10-CM

## 2022-05-24 DIAGNOSIS — Z96.652 STATUS POST TOTAL LEFT KNEE REPLACEMENT: Primary | ICD-10-CM

## 2022-05-24 PROCEDURE — 99213 OFFICE O/P EST LOW 20 MIN: CPT | Performed by: ORTHOPAEDIC SURGERY

## 2022-05-24 NOTE — PROGRESS NOTES
"    [facility] – [place] [facility] Clinic Note        Subjective     CC: Follow-up (4 month f/u - 6 months s/p total left knee replacement)      IVAN Chung is a 66 y.o. male.  Patient returns now 6 months out from left total knee arthroplasty on 2021.  Patient is doing well overall.  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      Overall, patient's symptoms are as above.    ROS:    Constiutional:Pt denies fever, chills, nausea, or vomiting.  MSK:as above        Objective      Past Medical History  Past Medical History:   Diagnosis Date   • Arthritis    • Elevated cholesterol    • Hearing loss    • History of COVID-19 2021    blood test showed positive antibodies in 2021 (no symptoms)   • History of staph infection 2004    Lost two vertebrae as a result of staph infection   • Hypercalcemia    • Hypertension    • Wears glasses          Physical Exam  /88   Ht 170.2 cm (67\")   Wt 96.6 kg (213 lb)   BMI 33.36 kg/m²     Body mass index is 33.36 kg/m².    Patient is well nourished and well developed.        Ortho Exam  Peripheral Vascular:    Upper Extremity:   Inspection:  Left--no cyanotic nail beds Right--no cyanotic nail beds   Bilateral:  Pink nail beds with brisk capillary refill   Palpation:  Bilateral radial pulse normal    Musculoskeletal:      Lower Extremity:     Inspection and Palpation:      Left knee:  Calf:  Soft and non tender  Effusion:  None  Pulses:  2+  Warmth:  None  Incision:  Healed     ROM:  Left:  Extension:0    Flexion:120      Deformities/Malalignments/Discrepancies:    Left:  none    Functional Testing:  Left:  Straight Leg Raise: 5/5  Patella Tracking:  Normal    Imaging/Labs/EMG Reviewed:  Imaging Results (Last 24 Hours)     ** No results found for the last 24 hours. **            Assessment    Assessment:  1. Status post total left knee replacement        Plan:  1. Recommend over the counter " anti-inflammatories for pain and/or swelling  2. Status post left total knee arthroplasty--at this point, patient is doing well overall.  I will see him back in 6 months or sooner if necessary.  Continue indefinite antibiotic prophylaxis.  3. For his right knee arthritis, we will see him back in November and get x-rays of both knees and we will discuss addressing the right knee sometime in February 2023.      Connor Anderson MD  05/24/22  10:03 EDT      Dictated Utilizing Dragon Dictation.

## 2022-05-24 NOTE — TELEPHONE ENCOUNTER
PLEASE CALL PT BACK  [phone]  HE WANTED TO KNOW IF HE NEEDS TO MAKE AND APPT FIRST OR DOES HE NEED TO HAVE LABS DRAWN BEFORE AN APPT IS SCHEDULED    PLEASE CALL PT BACK

## 2022-07-28 ENCOUNTER — LAB (OUTPATIENT)
Dept: LAB | Facility: HOSPITAL | Age: 67
End: 2022-07-28

## 2022-07-28 DIAGNOSIS — E83.52 HYPERCALCEMIA: ICD-10-CM

## 2022-07-28 LAB
ALBUMIN SERPL-MCNC: 4.7 G/DL (ref 3.5–5.2)
ANION GAP SERPL CALCULATED.3IONS-SCNC: 11.9 MMOL/L (ref 5–15)
BUN SERPL-MCNC: 23 MG/DL (ref 8–23)
BUN/CREAT SERPL: 24.2 (ref 7–25)
CALCIUM SPEC-SCNC: 11.2 MG/DL (ref 8.6–10.5)
CHLORIDE SERPL-SCNC: 105 MMOL/L (ref 98–107)
CO2 SERPL-SCNC: 22.1 MMOL/L (ref 22–29)
CREAT SERPL-MCNC: 0.95 MG/DL (ref 0.76–1.27)
EGFRCR SERPLBLD CKD-EPI 2021: 86.6 ML/MIN/1.73
GLUCOSE SERPL-MCNC: 99 MG/DL (ref 65–99)
PHOSPHATE SERPL-MCNC: 3.5 MG/DL (ref 2.5–4.5)
POTASSIUM SERPL-SCNC: 4.4 MMOL/L (ref 3.5–5.2)
PTH-INTACT SERPL-MCNC: 58.8 PG/ML (ref 15–65)
SODIUM SERPL-SCNC: 139 MMOL/L (ref 136–145)

## 2022-07-28 PROCEDURE — 82652 VIT D 1 25-DIHYDROXY: CPT

## 2022-07-28 PROCEDURE — 83970 ASSAY OF PARATHORMONE: CPT

## 2022-07-28 PROCEDURE — 80069 RENAL FUNCTION PANEL: CPT

## 2022-07-28 PROCEDURE — 82306 VITAMIN D 25 HYDROXY: CPT

## 2022-07-29 LAB — 25(OH)D3 SERPL-MCNC: 43.4 NG/ML (ref 30–100)

## 2022-08-01 LAB — 1,25(OH)2D SERPL-MCNC: 43.9 PG/ML (ref 24.8–81.5)

## 2022-10-05 ENCOUNTER — LAB (OUTPATIENT)
Dept: LAB | Facility: HOSPITAL | Age: 67
End: 2022-10-05

## 2022-10-05 ENCOUNTER — OFFICE VISIT (OUTPATIENT)
Dept: ENDOCRINOLOGY | Facility: CLINIC | Age: 67
End: 2022-10-05

## 2022-10-05 VITALS
OXYGEN SATURATION: 99 % | DIASTOLIC BLOOD PRESSURE: 82 MMHG | SYSTOLIC BLOOD PRESSURE: 132 MMHG | HEIGHT: 67 IN | WEIGHT: 217 LBS | BODY MASS INDEX: 34.06 KG/M2 | HEART RATE: 64 BPM

## 2022-10-05 DIAGNOSIS — E21.3 HYPERPARATHYROIDISM: Primary | ICD-10-CM

## 2022-10-05 LAB
25(OH)D3 SERPL-MCNC: 43.1 NG/ML (ref 30–100)
ALBUMIN SERPL-MCNC: 4.8 G/DL (ref 3.5–5.2)
ANION GAP SERPL CALCULATED.3IONS-SCNC: 9.6 MMOL/L (ref 5–15)
BUN SERPL-MCNC: 28 MG/DL (ref 8–23)
BUN/CREAT SERPL: 29.2 (ref 7–25)
CALCIUM SPEC-SCNC: 11.1 MG/DL (ref 8.6–10.5)
CHLORIDE SERPL-SCNC: 102 MMOL/L (ref 98–107)
CO2 SERPL-SCNC: 26.4 MMOL/L (ref 22–29)
CREAT SERPL-MCNC: 0.96 MG/DL (ref 0.76–1.27)
EGFRCR SERPLBLD CKD-EPI 2021: 85.6 ML/MIN/1.73
GLUCOSE SERPL-MCNC: 70 MG/DL (ref 65–99)
PHOSPHATE SERPL-MCNC: 2.4 MG/DL (ref 2.5–4.5)
POTASSIUM SERPL-SCNC: 4.7 MMOL/L (ref 3.5–5.2)
PTH-INTACT SERPL-MCNC: 48.2 PG/ML (ref 15–65)
SODIUM SERPL-SCNC: 138 MMOL/L (ref 136–145)

## 2022-10-05 PROCEDURE — 82306 VITAMIN D 25 HYDROXY: CPT | Performed by: INTERNAL MEDICINE

## 2022-10-05 PROCEDURE — 80069 RENAL FUNCTION PANEL: CPT | Performed by: INTERNAL MEDICINE

## 2022-10-05 PROCEDURE — 99213 OFFICE O/P EST LOW 20 MIN: CPT | Performed by: INTERNAL MEDICINE

## 2022-10-05 PROCEDURE — 83970 ASSAY OF PARATHORMONE: CPT | Performed by: INTERNAL MEDICINE

## 2022-10-05 NOTE — PROGRESS NOTES
"Chief Complaint   Patient presents with   • Abnormal Calcium        HPI   Ashok Chung is a 67 y.o. male had concerns including Abnormal Calcium.      Patient reports no significant health changes in the interim since his last visit.  He states that he generally feels well.  He denies any interval falls or fractures.  No interval kidney stones.  He does continue on vitamin D supplementation.  He reports that energy is intermittently decreased but attributes this to age.  Denies constipation.    The following portions of the patient's history were reviewed and updated as appropriate: allergies, current medications and past social history.    Review of Systems   Constitutional: Positive for fatigue.   Gastrointestinal: Negative for constipation.   Musculoskeletal: Positive for arthralgias.      /82 (BP Location: Right arm, Patient Position: Sitting, Cuff Size: Adult)   Pulse 64   Ht 170.2 cm (67\")   Wt 98.4 kg (217 lb)   SpO2 99%   BMI 33.99 kg/m²      Physical Exam      Constitutional:  well developed; well nourished  no acute distress  appears stated age   ENT/Thyroid: no thyromegaly   Eyes: Conjunctiva: clear   Respiratory:  breathing is unlabored  clear to auscultation bilaterally   Cardiovascular:  regular rate and rhythm   Chest:  Not performed.   Abdomen: soft, non-tender; no masses   : Not performed.   Musculoskeletal: Not performed   Skin: not performed.   Neuro: mental status, speech normal   Psych: mood and affect are within normal limits       Labs/Imaging   Latest Reference Range & Units 07/28/22 16:11   Glucose 65 - 99 mg/dL 99   Sodium 136 - 145 mmol/L 139   Potassium 3.5 - 5.2 mmol/L 4.4   CO2 22.0 - 29.0 mmol/L 22.1   Chloride 98 - 107 mmol/L 105   Anion Gap 5.0 - 15.0 mmol/L 11.9   Creatinine 0.76 - 1.27 mg/dL 0.95   BUN 8 - 23 mg/dL 23   BUN/Creatinine Ratio 7.0 - 25.0  24.2   Calcium 8.6 - 10.5 mg/dL 11.2 (H)   eGFR >60.0 mL/min/1.73 86.6   Albumin 3.50 - 5.20 g/dL 4.70   Phosphorus " 2.5 - 4.5 mg/dL 3.5   PTH Intact (Serial Monitor) 15.0 - 65.0 pg/mL 58.8   1,25-Dihydroxy, Vitamin D 24.8 - 81.5 pg/mL 43.9   25 Hydroxy, Vitamin D 30.0 - 100.0 ng/ml 43.4   (H): Data is abnormally high    Diagnoses and all orders for this visit:    1. Hyperparathyroidism (HCC) (Primary)  -     PTH, Intact  -     Renal Function Panel  -     Vitamin D 25 Hydroxy  -     DEXA Bone Density Axial; Future  Reviewed labs from July 2022, PTH inappropriately normal given normal vitamin D and elevated calcium.  Prior 24-hour urine collection sufficient to rule out FHH.  Discussed absolute surgical indications for primary hyperparathyroidism.  Patient has never had a DEXA scan, this was ordered today.  Discussed with patient that unless he has osteoporosis, he does not have an absolute indication for surgery.  We discussed symptoms of hypercalcemia as well as the dangers of calcium elevation.  Patient states that he does not wish to have surgery unless absolutely necessary.  If DEXA is normal and degree of calcium elevation does not exceed 1 mg/dL above reference range, will plan for continued intermittent monitoring.  Discussed need to maintain hydration as dehydration can trigger a dangerous rise in serum calcium acutely.       Return in about 6 months (around 4/5/2023). The patient was instructed to contact the clinic with any interval questions or concerns.    Margo Garg MD   Endocrinologist    Dictated Utilizing Dragon Dictation

## 2022-10-17 ENCOUNTER — HOSPITAL ENCOUNTER (OUTPATIENT)
Dept: BONE DENSITY | Facility: HOSPITAL | Age: 67
Discharge: HOME | End: 2022-10-17
Admitting: INTERNAL MEDICINE

## 2022-10-17 DIAGNOSIS — E21.3 HYPERPARATHYROIDISM: ICD-10-CM

## 2022-10-17 PROCEDURE — 77080 DXA BONE DENSITY AXIAL: CPT

## 2022-11-17 ENCOUNTER — PREP FOR SURGERY (OUTPATIENT)
Dept: OTHER | Facility: HOSPITAL | Age: 67
End: 2022-11-17

## 2022-11-17 ENCOUNTER — OFFICE VISIT (OUTPATIENT)
Dept: ORTHOPEDIC SURGERY | Facility: CLINIC | Age: 67
End: 2022-11-17

## 2022-11-17 VITALS
WEIGHT: 219.2 LBS | SYSTOLIC BLOOD PRESSURE: 148 MMHG | HEIGHT: 67 IN | DIASTOLIC BLOOD PRESSURE: 78 MMHG | BODY MASS INDEX: 34.4 KG/M2

## 2022-11-17 DIAGNOSIS — M17.11 PRIMARY OSTEOARTHRITIS OF RIGHT KNEE: ICD-10-CM

## 2022-11-17 DIAGNOSIS — Z96.652 STATUS POST TOTAL LEFT KNEE REPLACEMENT: Primary | ICD-10-CM

## 2022-11-17 DIAGNOSIS — M25.561 RIGHT KNEE PAIN, UNSPECIFIED CHRONICITY: ICD-10-CM

## 2022-11-17 PROCEDURE — 99214 OFFICE O/P EST MOD 30 MIN: CPT | Performed by: ORTHOPAEDIC SURGERY

## 2022-11-17 ASSESSMENT — KOOS JR
KOOS JR SCORE: 5
KOOS JR SCORE: 73.342

## 2022-11-17 NOTE — PROGRESS NOTES
"    [facility] – [place] Orthopaedic Surgery Clinic Note        Subjective     CC: Pain of the Right Knee and Follow-up (5 month F/U ,1 year Status post total left knee replacement (2022))      IVAN Chung is a 67 y.o. male.  Patient is here today for follow-up of his left total knee arthroplasty done on 2021.  He is doing well overall.  Occasional swelling.  He is doing everything he wants to do.  He is also here for his painful right knee.  The right knee bothers him quite a bit.  He continues to work at the  home and at [employer].  After long periods of standing, he does have trouble.  Has a remote history of cervical spine infection treated by Dr. Chauhan at .  Very leery of infection.    Overall, patient's symptoms are as above.    ROS:    Constiutional:Pt denies fever, chills, nausea, or vomiting.  MSK:as above        Objective      Past Medical History  Past Medical History:   Diagnosis Date   • Arthritis    • Elevated cholesterol    • Hearing loss    • History of COVID-19 2021    blood test showed positive antibodies in 2021 (no symptoms)   • History of staph infection     Lost two vertebrae as a result of staph infection   • Hypercalcemia    • Hypertension    • Wears glasses          Physical Exam  /78   Ht 170.2 cm (67.01\")   Wt 99.4 kg (219 lb 3.2 oz)   BMI 34.32 kg/m²     Body mass index is 34.32 kg/m².    Patient is well nourished and well developed.        Ortho Exam      Musculoskeletal:  Global Assessment:  Overall assessment of Lower Extremity Muscle Strength and Tone:  Right quadriceps--5/5   Right hamstrings--5/5       Right tibialis anterior--5/5  Right gastroc-soleus--5/5  Right EHL --5/5    Lower Extremity:    Inspection and Palpation:  Right knee:  Tenderness:  Over the medial joint line and moderate severity  Effusion:  1+  Crepitus:  Positive  Pulses:  2+  Ecchymosis:  None  Warmth:  None     ROM:  Right:  Extension: 5    Flexion:120    Instability:  "   Right:  Lachman Test:  Negative   Varus stress test negative   Valgus stress test negative    Deformities/Malalignments/Discrepancies:    Left:  No deformities   Right:  Genu Varum    Functional Testing:  Te's test:  Negative  Patella grind test:  Positive  Q-angle:  normal      Peripheral Vascular:    Upper Extremity:   Inspection:  Left--no cyanotic nail beds Right--no cyanotic nail beds   Bilateral:  Pink nail beds with brisk capillary refill   Palpation:  Bilateral radial pulse normal    Musculoskeletal:      Lower Extremity:     Inspection and Palpation:      Left knee:  Calf:  Soft and non tender  Effusion:  None  Pulses:  2+  Warmth:  None  Incision:  Healed     ROM:  Left:  Extension:0    Flexion:120      Deformities/Malalignments/Discrepancies:    Left:  none    Functional Testing:  Left:  Straight Leg Raise: 5/5  Patella Tracking:  Normal    Imaging/Labs/EMG Reviewed:  Imaging Results (Last 24 Hours)     Procedure Component Value Units Date/Time    XR Knee 4+ View Right [[id number]] Resulted: 11/17/22 0946     Updated: 11/17/22 0947    Narrative:      Knee X-Ray    Indication: Pain    Study:  Upright AP, Skiers, Lateral, and Sunrise views of Right knee(s)    Comparison: Bilateral knees 8/31/2021    Findings:    Patient appears to have severe hypertrophic degenerative changes in the   medial compartment.    There are mild to moderate degenerative changes in the lateral   compartment.    There are moderate to early severe changes in the patellofemoral   compartment.    Patient has overall varus alignment.    Kellgren-Tino thGthrthathdtheth:th th4th Impression:   Severe hypertrophic medial compartment and moderate to early severe   patellofemoral compartment degnerative changes of the right knee       XR Knee 3+ View With [place] Left [[id number]] Resulted: 11/17/22 0946     Updated: 11/17/22 0946    Narrative:      Knee X-ray    Indication: status-post TKA    Study:  AP, Lateral, and Sunrise views of Left  knee    Comparison: Left knee 12/2/2021    Findings:  No signs of acute fracture are visualized  No signs of loosening are appreciated  Components are well aligned    Impression:  Status post Left total knee arthroplasty. No signs of   loosening or fracture.                Assessment    Assessment:  1. Status post total left knee replacement    2. Right knee pain, unspecified chronicity    3. Primary osteoarthritis of right knee        Plan:  1. Recommend over the counter anti-inflammatories for pain and/or swelling  2. Status post left total knee arthroplasty--patient is doing well overall.  Range of motion is excellent.  Continue indefinite antibiotic prophylaxis going forward.  Will need clindamycin rather than amoxicillin given his drug allergies.  3. Right knee arthritis--severe.  We had a lengthy discussion regarding the risk, benefits, potential hazards, typical recovery and rehab as well as reasonable alternatives to right total knee arthroplasty.  Patient would like to proceed with scheduling.  We will use full dose aspirin daily for 6 weeks for DVT prophylaxis.  We will go straight to orthopedic and sports PT as an outpatient after surgery.  He will stay 23 hours to get the full dosing of vancomycin.  We will use vancomycin preoperatively and need the entire dose and prior to making incision.  I will see him back preoperatively to finalize plans and arranges physical therapy and answer any final questions.      Connor Anderson MD  11/17/22  10:18 EST      Dictated Utilizing Dragon Dictation.

## 2022-11-30 ENCOUNTER — PREP FOR SURGERY (OUTPATIENT)
Dept: OTHER | Facility: HOSPITAL | Age: 67
End: 2022-11-30

## 2022-11-30 DIAGNOSIS — M17.11 PRIMARY OSTEOARTHRITIS OF RIGHT KNEE: Primary | ICD-10-CM

## 2022-11-30 RX ORDER — ACETAMINOPHEN 500 MG
1000 TABLET ORAL ONCE
Status: CANCELLED | OUTPATIENT
Start: 2022-11-30 | End: 2022-11-30

## 2022-11-30 RX ORDER — TRANEXAMIC ACID 10 MG/ML
1000 INJECTION, SOLUTION INTRAVENOUS ONCE
Status: CANCELLED | OUTPATIENT
Start: 2022-11-30 | End: 2022-11-30

## 2022-11-30 RX ORDER — OXYCODONE HCL 10 MG/1
10 TABLET, FILM COATED, EXTENDED RELEASE ORAL ONCE
Status: CANCELLED | OUTPATIENT
Start: 2022-11-30 | End: 2022-11-30

## 2022-11-30 RX ORDER — CHLORHEXIDINE GLUCONATE 4 G/100ML
SOLUTION TOPICAL DAILY
Qty: 236 ML | Refills: 0 | Status: ON HOLD | OUTPATIENT
Start: 2022-11-30 | End: 2023-02-22

## 2023-02-10 ENCOUNTER — PRE-ADMISSION TESTING (OUTPATIENT)
Dept: PREADMISSION TESTING | Facility: HOSPITAL | Age: 68
End: 2023-02-10
Payer: MEDICARE

## 2023-02-10 VITALS — HEIGHT: 67 IN | BODY MASS INDEX: 34.46 KG/M2 | WEIGHT: 219.58 LBS

## 2023-02-10 DIAGNOSIS — M17.11 PRIMARY OSTEOARTHRITIS OF RIGHT KNEE: ICD-10-CM

## 2023-02-10 LAB
ANION GAP SERPL CALCULATED.3IONS-SCNC: 7 MMOL/L (ref 5–15)
BASOPHILS # BLD AUTO: 0.07 10*3/MM3 (ref 0–0.2)
BASOPHILS NFR BLD AUTO: 1 % (ref 0–1.5)
BUN SERPL-MCNC: 19 MG/DL (ref 8–23)
BUN/CREAT SERPL: 19.2 (ref 7–25)
CALCIUM SPEC-SCNC: 11.4 MG/DL (ref 8.6–10.5)
CHLORIDE SERPL-SCNC: 103 MMOL/L (ref 98–107)
CO2 SERPL-SCNC: 29 MMOL/L (ref 22–29)
CREAT SERPL-MCNC: 0.99 MG/DL (ref 0.76–1.27)
CRP SERPL-MCNC: 0.38 MG/DL (ref 0–0.5)
DEPRECATED RDW RBC AUTO: 41 FL (ref 37–54)
EGFRCR SERPLBLD CKD-EPI 2021: 82.5 ML/MIN/1.73
EOSINOPHIL # BLD AUTO: 0.24 10*3/MM3 (ref 0–0.4)
EOSINOPHIL NFR BLD AUTO: 3.4 % (ref 0.3–6.2)
ERYTHROCYTE [DISTWIDTH] IN BLOOD BY AUTOMATED COUNT: 12.4 % (ref 12.3–15.4)
ERYTHROCYTE [SEDIMENTATION RATE] IN BLOOD: 15 MM/HR (ref 0–20)
GLUCOSE SERPL-MCNC: 114 MG/DL (ref 65–99)
HBA1C MFR BLD: 5.7 % (ref 4.8–5.6)
HCT VFR BLD AUTO: 46.7 % (ref 37.5–51)
HGB BLD-MCNC: 15.8 G/DL (ref 13–17.7)
IMM GRANULOCYTES # BLD AUTO: 0.02 10*3/MM3 (ref 0–0.05)
IMM GRANULOCYTES NFR BLD AUTO: 0.3 % (ref 0–0.5)
LYMPHOCYTES # BLD AUTO: 1.19 10*3/MM3 (ref 0.7–3.1)
LYMPHOCYTES NFR BLD AUTO: 17.1 % (ref 19.6–45.3)
MCH RBC QN AUTO: 30.4 PG (ref 26.6–33)
MCHC RBC AUTO-ENTMCNC: 33.8 G/DL (ref 31.5–35.7)
MCV RBC AUTO: 90 FL (ref 79–97)
MONOCYTES # BLD AUTO: 0.41 10*3/MM3 (ref 0.1–0.9)
MONOCYTES NFR BLD AUTO: 5.9 % (ref 5–12)
NEUTROPHILS NFR BLD AUTO: 5.03 10*3/MM3 (ref 1.7–7)
NEUTROPHILS NFR BLD AUTO: 72.3 % (ref 42.7–76)
NRBC BLD AUTO-RTO: 0 /100 WBC (ref 0–0.2)
PLATELET # BLD AUTO: 245 10*3/MM3 (ref 140–450)
PMV BLD AUTO: 9.7 FL (ref 6–12)
POTASSIUM SERPL-SCNC: 5.3 MMOL/L (ref 3.5–5.2)
QT INTERVAL: 396 MS
QTC INTERVAL: 388 MS
RBC # BLD AUTO: 5.19 10*6/MM3 (ref 4.14–5.8)
SODIUM SERPL-SCNC: 139 MMOL/L (ref 136–145)
WBC NRBC COR # BLD: 6.96 10*3/MM3 (ref 3.4–10.8)

## 2023-02-10 PROCEDURE — 93005 ELECTROCARDIOGRAM TRACING: CPT

## 2023-02-10 PROCEDURE — 93010 ELECTROCARDIOGRAM REPORT: CPT | Performed by: INTERNAL MEDICINE

## 2023-02-10 PROCEDURE — 85652 RBC SED RATE AUTOMATED: CPT

## 2023-02-10 PROCEDURE — 36415 COLL VENOUS BLD VENIPUNCTURE: CPT

## 2023-02-10 PROCEDURE — 80048 BASIC METABOLIC PNL TOTAL CA: CPT

## 2023-02-10 PROCEDURE — 85025 COMPLETE CBC W/AUTO DIFF WBC: CPT

## 2023-02-10 PROCEDURE — 83036 HEMOGLOBIN GLYCOSYLATED A1C: CPT

## 2023-02-10 PROCEDURE — 86140 C-REACTIVE PROTEIN: CPT

## 2023-02-10 RX ORDER — FAMOTIDINE 20 MG/1
20 TABLET, FILM COATED ORAL 2 TIMES DAILY
COMMUNITY

## 2023-02-10 NOTE — PAT
An arrival time for procedure was not provided during PAT visit. If patient had any questions or concerns about their arrival time, they were instructed to contact their surgeon/physician.  Additionally, if the patient referred to an arrival time that was acquired from their my chart account, patient was encouraged to verify that time with their surgeon/physician. Arrival times are NOT provided in Pre Admission Testing Department.    Prescription for Chlorhexidine shower called into patient's pharmacy or [facility] pharmacy by patient's surgeon.  Reinforced with patient to  the prescription from applicable pharmacy if they haven't already.  Verbal and written instructions given regarding proper use of the Bactroban (if prescribed) and Chlorhexidine to patient and/or famlily during PAT visit. Patient/family also instructed to complete checklist and return it to Pre-op on the day of surgery.  Patient and/or family verbalized understanding.    Patient to apply Chlorhexadine wipes  to surgical area (as instructed) the night before procedure and the AM of procedure. Wipes provided.    Patient instructed to drink 20 ounces of Gatorade and it needs to be completed 1 hour (for Main OR patients) or 2 hours (scheduled  section & [facility] patients) before given arrival time for procedure (NO RED Gatorade)    Patient verbalized understanding.    Per Anesthesia Request, patient instructed not to take their ACE/ARB medications on the AM of surgery.    Patient viewed general PAT education video as instructed in their preoperative information received from their surgeon.  Patient stated the general PAT education video was viewed in its entirety and survey completed.  Copies of PAT general education handouts (Incentive Spirometry, Meds to Beds Program, Patient Belongings, Pre-op skin preparation instructions, Blood Glucose testing, Visitor policy, Surgery FAQ, Code H) distributed to patient if not printed. Education related  to the PAT pass and skin preparation for surgery (if applicable) completed in PAT as a reinforcement to PAT education video. Patient instructed to return PAT pass provided today as well as completed skin preparation sheet (if applicable) on the day of procedure.     Additionally if patient had not viewed video yet but intended to view it at home or in our waiting area, then referred them to the handout with QR code/link provided during PAT visit.  Instructed patient to complete survey after viewing the video in its entirety.  Encouraged patient/family to read PAT general education handouts thoroughly and notify PAT staff with any questions or concerns. Patient verbalized understanding of all information and priority content.    InfuBLOCK (by [employer]) pain pump patient informational handout given to patient.  Instructed patient to watch InfuBLOCK Patient Education Video regarding Peripheral Nerve Catheter that will be in place for upcoming surgery unless contraindicated. The video can be accessed using QR code noted on handout.  Patient agreed to watch video.  Stressed to patient to call [facility] Nursing Hotline 24/7 if patient has any questions or concerns after discharge.     PATIENT EKG ON CHART AND IN Epic FROM 2/10/23    Discussed with patient options for receiving total joint replacement education and assessed patient's ability and preference. Joint Replacement Guide given to patient during PAT visit since not received a copy within the last year. Encouraged patient/family to read guide thoroughly and notify PAT staff with any questions or concerns. Handout provided directing patient to links to watch online videos related to joint replacement surgery on the [facility] website. The handout gives detailed instructions for joining an online joint replacement class through Zoom or phone conference offered on Thursdays. Patient agreed to participate by watching videos online. Patient verbalized  understanding of instructions and to complete the online learning tool survey. Encouraged to share information with family and/or . An overview of the joint replacement education was provided during the visit including general perioperative instructions that are routine for all surgical patients (PAT PASS, wipes, directions to pre-op, etc.).

## 2023-02-21 ENCOUNTER — ANESTHESIA EVENT (OUTPATIENT)
Dept: PERIOP | Facility: HOSPITAL | Age: 68
End: 2023-02-21
Payer: MEDICARE

## 2023-02-21 ENCOUNTER — OFFICE VISIT (OUTPATIENT)
Dept: ORTHOPEDIC SURGERY | Facility: CLINIC | Age: 68
End: 2023-02-21
Payer: MEDICARE

## 2023-02-21 VITALS
BODY MASS INDEX: 34.37 KG/M2 | HEIGHT: 67 IN | DIASTOLIC BLOOD PRESSURE: 70 MMHG | WEIGHT: 219 LBS | SYSTOLIC BLOOD PRESSURE: 156 MMHG

## 2023-02-21 DIAGNOSIS — M17.11 PRIMARY OSTEOARTHRITIS OF RIGHT KNEE: Primary | ICD-10-CM

## 2023-02-21 PROCEDURE — S0260 H&P FOR SURGERY: HCPCS | Performed by: ORTHOPAEDIC SURGERY

## 2023-02-21 RX ORDER — FAMOTIDINE 10 MG/ML
20 INJECTION, SOLUTION INTRAVENOUS ONCE
Status: CANCELLED | OUTPATIENT
Start: 2023-02-21 | End: 2023-02-21

## 2023-02-21 RX ORDER — SODIUM CHLORIDE 9 MG/ML
40 INJECTION, SOLUTION INTRAVENOUS AS NEEDED
Status: CANCELLED | OUTPATIENT
Start: 2023-02-21

## 2023-02-21 NOTE — PROGRESS NOTES
"    [facility] [place] [facility] Clinic Note        Subjective     CC: Follow-up (3 month follow up -- Primary osteoarthritis of right knee)      HPI    Ashok Chung is a 67 y.o. male.  Patient is here today with his wife for his preop appointment for right TKA scheduled for tomorrow.    Overall, patient's symptoms are unchanged from his last visit on 11/17/2022.    ROS:    Constiutional:Pt denies fever, chills, nausea, or vomiting.  MSK:as above        Objective      Past Medical History  Past Medical History:   Diagnosis Date   • Arthritis    • Elevated cholesterol    • Hearing loss    • History of COVID-19 02/2021    blood test showed positive antibodies in Feb 2021 (no symptoms)   • History of staph infection 2004    Lost two vertebrae as a result of staph infection   • Hypercalcemia    • Hypertension    • Staph infection 2000    C2-C3   • Wears glasses      Social History     Socioeconomic History   • Marital status:    Tobacco Use   • Smoking status: Never   • Smokeless tobacco: Former     Types: Chew     Quit date: 1986   Vaping Use   • Vaping Use: Never used   Substance and Sexual Activity   • Alcohol use: Yes     Alcohol/week: 5.0 standard drinks     Types: 4 Cans of beer, 1 Shots of liquor per week     Comment: SOCIALLY   • Drug use: Never   • Sexual activity: Defer          Physical Exam  /70   Ht 170.2 cm (67.01\")   Wt 99.3 kg (219 lb)   BMI 34.29 kg/m²     Body mass index is 34.29 kg/m².    Patient is well nourished and well developed.        Ortho Exam      Musculoskeletal:  Global Assessment:  Overall assessment of Lower Extremity Muscle Strength and Tone:  Right quadriceps--5/5   Right hamstrings--5/5       Right tibialis anterior--5/5  Right gastroc-soleus--5/5  Right EHL --5/5    Lower Extremity:    Inspection and Palpation:  Right knee:  Tenderness:  Over the medial joint line and moderate severity  Effusion:  1+  Crepitus:  Positive  Pulses:  2+  Ecchymosis:  None  Warmth:  None "     ROM:  Right:  Extension: 5    Flexion:120    Instability:    Right:  Lachman Test:  Negative   Varus stress test negative   Valgus stress test negative    Deformities/Malalignments/Discrepancies:    Left:  No deformities   Right:  Genu Varum    Functional Testing:  Te's test:  Negative  Patella grind test:  Positive  Q-angle:  normal          Imaging/Labs/EMG Reviewed:  Imaging Results (Last 24 Hours)     ** No results found for the last 24 hours. **            Assessment    Assessment:  1. Primary osteoarthritis of right knee        Plan:  1. Recommend over the counter anti-inflammatories for pain and/or swelling  2. Right knee arthritis--plans for right total knee arthroplasty tomorrow.  We again answered any questions and concerns that he and his wife had regarding the surgery.  He will start therapy the Friday after his surgery.  We will use vancomycin preoperatively make sure the full dose is infused before we make incision due to his history of staph.  Full dose aspirin daily for DVT prophylaxis.  He will stay 23 hours.  We will make sure that he is on the orthopedic floor if at all possible and gets informative and aggressive physical therapy      Connor Anderson MD  02/21/23  13:01 EST      Dictated Utilizing Dragon Dictation.

## 2023-02-21 NOTE — H&P (VIEW-ONLY)
"    [facility] [place] [facility] Note        Subjective     CC: Follow-up (3 month follow up -- Primary osteoarthritis of right knee)      HPI    Ashok Chung is a 67 y.o. male.  Patient is here today with his wife for his preop appointment for right TKA scheduled for tomorrow.    Overall, patient's symptoms are unchanged from his last visit on 11/17/2022.    ROS:    Constiutional:Pt denies fever, chills, nausea, or vomiting.  MSK:as above        Objective      Past Medical History  Past Medical History:   Diagnosis Date   • Arthritis    • Elevated cholesterol    • Hearing loss    • History of COVID-19 02/2021    blood test showed positive antibodies in Feb 2021 (no symptoms)   • History of staph infection 2004    Lost two vertebrae as a result of staph infection   • Hypercalcemia    • Hypertension    • Staph infection 2000    C2-C3   • Wears glasses      Social History     Socioeconomic History   • Marital status:    Tobacco Use   • Smoking status: Never   • Smokeless tobacco: Former     Types: Chew     Quit date: 1986   Vaping Use   • Vaping Use: Never used   Substance and Sexual Activity   • Alcohol use: Yes     Alcohol/week: 5.0 standard drinks     Types: 4 Cans of beer, 1 Shots of liquor per week     Comment: SOCIALLY   • Drug use: Never   • Sexual activity: Defer          Physical Exam  /70   Ht 170.2 cm (67.01\")   Wt 99.3 kg (219 lb)   BMI 34.29 kg/m²     Body mass index is 34.29 kg/m².    Patient is well nourished and well developed.        Ortho Exam      Musculoskeletal:  Global Assessment:  Overall assessment of Lower Extremity Muscle Strength and Tone:  Right quadriceps--5/5   Right hamstrings--5/5       Right tibialis anterior--5/5  Right gastroc-soleus--5/5  Right EHL --5/5    Lower Extremity:    Inspection and Palpation:  Right knee:  Tenderness:  Over the medial joint line and moderate severity  Effusion:  1+  Crepitus:  Positive  Pulses:  2+  Ecchymosis:  None  Warmth:  None "     ROM:  Right:  Extension: 5    Flexion:120    Instability:    Right:  Lachman Test:  Negative   Varus stress test negative   Valgus stress test negative    Deformities/Malalignments/Discrepancies:    Left:  No deformities   Right:  Genu Varum    Functional Testing:  Te's test:  Negative  Patella grind test:  Positive  Q-angle:  normal          Imaging/Labs/EMG Reviewed:  Imaging Results (Last 24 Hours)     ** No results found for the last 24 hours. **            Assessment    Assessment:  1. Primary osteoarthritis of right knee        Plan:  1. Recommend over the counter anti-inflammatories for pain and/or swelling  2. Right knee arthritis--plans for right total knee arthroplasty tomorrow.  We again answered any questions and concerns that he and his wife had regarding the surgery.  He will start therapy the Friday after his surgery.  We will use vancomycin preoperatively make sure the full dose is infused before we make incision due to his history of staph.  Full dose aspirin daily for DVT prophylaxis.  He will stay 23 hours.  We will make sure that he is on the orthopedic floor if at all possible and gets informative and aggressive physical therapy      Connor Anderson MD  02/21/23  13:01 EST      Dictated Utilizing Dragon Dictation.

## 2023-02-22 ENCOUNTER — APPOINTMENT (OUTPATIENT)
Dept: GENERAL RADIOLOGY | Facility: HOSPITAL | Age: 68
End: 2023-02-22
Payer: MEDICARE

## 2023-02-22 ENCOUNTER — ANESTHESIA EVENT CONVERTED (OUTPATIENT)
Dept: ANESTHESIOLOGY | Facility: HOSPITAL | Age: 68
End: 2023-02-22
Payer: MEDICARE

## 2023-02-22 ENCOUNTER — ANESTHESIA (OUTPATIENT)
Dept: PERIOP | Facility: HOSPITAL | Age: 68
End: 2023-02-22
Payer: MEDICARE

## 2023-02-22 ENCOUNTER — HOSPITAL ENCOUNTER (OUTPATIENT)
Facility: HOSPITAL | Age: 68
Discharge: HOME | End: 2023-02-23
Attending: ORTHOPAEDIC SURGERY | Admitting: ORTHOPAEDIC SURGERY
Payer: MEDICARE

## 2023-02-22 DIAGNOSIS — Z96.652 STATUS POST TOTAL LEFT KNEE REPLACEMENT: ICD-10-CM

## 2023-02-22 DIAGNOSIS — M17.11 PRIMARY OSTEOARTHRITIS OF RIGHT KNEE: ICD-10-CM

## 2023-02-22 DIAGNOSIS — G89.18 POSTOPERATIVE PAIN: Primary | ICD-10-CM

## 2023-02-22 DIAGNOSIS — Z96.651 STATUS POST TOTAL RIGHT KNEE REPLACEMENT: Primary | ICD-10-CM

## 2023-02-22 PROBLEM — R73.09 ELEVATED HEMOGLOBIN A1C: Status: ACTIVE | Noted: 2023-02-22

## 2023-02-22 LAB — GLUCOSE BLDC GLUCOMTR-MCNC: 93 MG/DL (ref 70–130)

## 2023-02-22 PROCEDURE — 63710000001 FAMOTIDINE 20 MG TABLET: Performed by: ANESTHESIOLOGY

## 2023-02-22 PROCEDURE — C1755 CATHETER, INTRASPINAL: HCPCS | Performed by: ORTHOPAEDIC SURGERY

## 2023-02-22 PROCEDURE — 63710000001 HYDROCODONE-ACETAMINOPHEN 10-325 MG TABLET: Performed by: ORTHOPAEDIC SURGERY

## 2023-02-22 PROCEDURE — 63710000001 MELATONIN 5 MG TABLET: Performed by: NURSE PRACTITIONER

## 2023-02-22 PROCEDURE — 97110 THERAPEUTIC EXERCISES: CPT

## 2023-02-22 PROCEDURE — 63710000001 LISINOPRIL 20 MG TABLET: Performed by: NURSE PRACTITIONER

## 2023-02-22 PROCEDURE — C1776 JOINT DEVICE (IMPLANTABLE): HCPCS | Performed by: ORTHOPAEDIC SURGERY

## 2023-02-22 PROCEDURE — 82962 GLUCOSE BLOOD TEST: CPT

## 2023-02-22 PROCEDURE — 27447 TOTAL KNEE ARTHROPLASTY: CPT | Performed by: ORTHOPAEDIC SURGERY

## 2023-02-22 PROCEDURE — C1713 ANCHOR/SCREW BN/BN,TIS/BN: HCPCS | Performed by: ORTHOPAEDIC SURGERY

## 2023-02-22 PROCEDURE — 25010000002 ROPIVACAINE PER 1 MG: Performed by: ORTHOPAEDIC SURGERY

## 2023-02-22 PROCEDURE — A9270 NON-COVERED ITEM OR SERVICE: HCPCS | Performed by: NURSE PRACTITIONER

## 2023-02-22 PROCEDURE — 63710000001 ACETAMINOPHEN 500 MG TABLET: Performed by: ORTHOPAEDIC SURGERY

## 2023-02-22 PROCEDURE — 25010000002 ROPIVACAINE PER 1 MG: Performed by: NURSE ANESTHETIST, CERTIFIED REGISTERED

## 2023-02-22 PROCEDURE — A9270 NON-COVERED ITEM OR SERVICE: HCPCS | Performed by: ANESTHESIOLOGY

## 2023-02-22 PROCEDURE — 25010000002 PROPOFOL 10 MG/ML EMULSION: Performed by: ANESTHESIOLOGY

## 2023-02-22 PROCEDURE — 25010000002 MORPHINE PER 10 MG: Performed by: ORTHOPAEDIC SURGERY

## 2023-02-22 PROCEDURE — 63710000001 OXYCODONE 10 MG TABLET EXTENDED-RELEASE 12 HOUR: Performed by: ORTHOPAEDIC SURGERY

## 2023-02-22 PROCEDURE — 63710000001 FAMOTIDINE 20 MG TABLET: Performed by: NURSE PRACTITIONER

## 2023-02-22 PROCEDURE — 25010000002 ONDANSETRON PER 1 MG: Performed by: ANESTHESIOLOGY

## 2023-02-22 PROCEDURE — 97161 PT EVAL LOW COMPLEX 20 MIN: CPT

## 2023-02-22 PROCEDURE — 27447 TOTAL KNEE ARTHROPLASTY: CPT | Performed by: PHYSICIAN ASSISTANT

## 2023-02-22 PROCEDURE — 63710000001 MELOXICAM 15 MG TABLET: Performed by: ORTHOPAEDIC SURGERY

## 2023-02-22 PROCEDURE — A9270 NON-COVERED ITEM OR SERVICE: HCPCS | Performed by: ORTHOPAEDIC SURGERY

## 2023-02-22 PROCEDURE — 25010000002 VANCOMYCIN 10 G RECONSTITUTED SOLUTION: Performed by: ORTHOPAEDIC SURGERY

## 2023-02-22 PROCEDURE — 25010000002 VANCOMYCIN 10 G RECONSTITUTED SOLUTION

## 2023-02-22 PROCEDURE — 25010000002 KETOROLAC TROMETHAMINE PER 15 MG: Performed by: ORTHOPAEDIC SURGERY

## 2023-02-22 PROCEDURE — 73560 X-RAY EXAM OF KNEE 1 OR 2: CPT

## 2023-02-22 PROCEDURE — 63710000001 OXYCODONE 5 MG TABLET: Performed by: ORTHOPAEDIC SURGERY

## 2023-02-22 PROCEDURE — 25010000002 DEXAMETHASONE PER 1 MG: Performed by: ANESTHESIOLOGY

## 2023-02-22 PROCEDURE — 63710000001 POVIDONE-IODINE 10 % SOLUTION 30 ML BOTTLE: Performed by: ORTHOPAEDIC SURGERY

## 2023-02-22 PROCEDURE — 25010000002 SODIUM CHLORIDE 0.9 % WITH KCL 20 MEQ 20-0.9 MEQ/L-% SOLUTION: Performed by: ORTHOPAEDIC SURGERY

## 2023-02-22 DEVICE — IMPLANTABLE DEVICE
Type: IMPLANTABLE DEVICE | Site: KNEE | Status: FUNCTIONAL
Brand: BIOMET® BONE CEMENT R

## 2023-02-22 DEVICE — ART/SRF KN PERSONA PS EF MC 6TO7 10MM RT: Type: IMPLANTABLE DEVICE | Site: KNEE | Status: FUNCTIONAL

## 2023-02-22 DEVICE — STEM TIB/KN PERSONA CMT 5D SZF RT: Type: IMPLANTABLE DEVICE | Site: KNEE | Status: FUNCTIONAL

## 2023-02-22 DEVICE — COMP FEM/KN PERSONA CR CMT COCR STD SZ7 RT: Type: IMPLANTABLE DEVICE | Site: KNEE | Status: FUNCTIONAL

## 2023-02-22 DEVICE — DEV CONTRL TISS STRATAFIX SYMM PDS PLUS VIL CT-1 45CM: Type: IMPLANTABLE DEVICE | Site: KNEE | Status: FUNCTIONAL

## 2023-02-22 DEVICE — CAP TOTL KN CMT PRIMARY: Type: IMPLANTABLE DEVICE | Site: KNEE | Status: FUNCTIONAL

## 2023-02-22 DEVICE — IMPLANTABLE DEVICE: Type: IMPLANTABLE DEVICE | Site: KNEE | Status: FUNCTIONAL

## 2023-02-22 RX ORDER — IBUPROFEN 600 MG/1
600 TABLET ORAL EVERY 6 HOURS PRN
Qty: 90 TABLET | Refills: 0 | Status: SHIPPED | OUTPATIENT
Start: 2023-02-22 | End: 2023-03-21 | Stop reason: SDUPTHER

## 2023-02-22 RX ORDER — ASPIRIN 325 MG
325 TABLET, DELAYED RELEASE (ENTERIC COATED) ORAL DAILY
Qty: 42 TABLET | Refills: 0 | Status: SHIPPED | OUTPATIENT
Start: 2023-02-22 | End: 2023-03-21 | Stop reason: SDUPTHER

## 2023-02-22 RX ORDER — TRANEXAMIC ACID 10 MG/ML
1000 INJECTION, SOLUTION INTRAVENOUS ONCE
Status: COMPLETED | OUTPATIENT
Start: 2023-02-22 | End: 2023-02-22

## 2023-02-22 RX ORDER — SODIUM CHLORIDE 0.9 % (FLUSH) 0.9 %
3-10 SYRINGE (ML) INJECTION AS NEEDED
Status: DISCONTINUED | OUTPATIENT
Start: 2023-02-22 | End: 2023-02-23 | Stop reason: HOSPADM

## 2023-02-22 RX ORDER — DOCUSATE SODIUM 100 MG/1
100 CAPSULE, LIQUID FILLED ORAL 2 TIMES DAILY PRN
Status: DISCONTINUED | OUTPATIENT
Start: 2023-02-22 | End: 2023-02-23 | Stop reason: HOSPADM

## 2023-02-22 RX ORDER — HYDROCODONE BITARTRATE AND ACETAMINOPHEN 10; 325 MG/1; MG/1
1 TABLET ORAL EVERY 4 HOURS PRN
Status: DISCONTINUED | OUTPATIENT
Start: 2023-02-22 | End: 2023-02-23 | Stop reason: HOSPADM

## 2023-02-22 RX ORDER — FENTANYL CITRATE 50 UG/ML
50 INJECTION, SOLUTION INTRAMUSCULAR; INTRAVENOUS
Status: DISCONTINUED | OUTPATIENT
Start: 2023-02-22 | End: 2023-02-22 | Stop reason: HOSPADM

## 2023-02-22 RX ORDER — SODIUM CHLORIDE 0.9 % (FLUSH) 0.9 %
10 SYRINGE (ML) INJECTION EVERY 12 HOURS SCHEDULED
Status: DISCONTINUED | OUTPATIENT
Start: 2023-02-22 | End: 2023-02-22 | Stop reason: HOSPADM

## 2023-02-22 RX ORDER — SODIUM CHLORIDE AND POTASSIUM CHLORIDE 150; 900 MG/100ML; MG/100ML
50 INJECTION, SOLUTION INTRAVENOUS CONTINUOUS
Status: DISCONTINUED | OUTPATIENT
Start: 2023-02-22 | End: 2023-02-23 | Stop reason: HOSPADM

## 2023-02-22 RX ORDER — DEXAMETHASONE SODIUM PHOSPHATE 4 MG/ML
INJECTION, SOLUTION INTRA-ARTICULAR; INTRALESIONAL; INTRAMUSCULAR; INTRAVENOUS; SOFT TISSUE AS NEEDED
Status: DISCONTINUED | OUTPATIENT
Start: 2023-02-22 | End: 2023-02-22 | Stop reason: SURG

## 2023-02-22 RX ORDER — NALOXONE HCL 0.4 MG/ML
0.4 VIAL (ML) INJECTION
Status: DISCONTINUED | OUTPATIENT
Start: 2023-02-22 | End: 2023-02-23 | Stop reason: HOSPADM

## 2023-02-22 RX ORDER — LABETALOL HYDROCHLORIDE 5 MG/ML
10 INJECTION, SOLUTION INTRAVENOUS EVERY 4 HOURS PRN
Status: DISCONTINUED | OUTPATIENT
Start: 2023-02-22 | End: 2023-02-23 | Stop reason: HOSPADM

## 2023-02-22 RX ORDER — ONDANSETRON 2 MG/ML
4 INJECTION INTRAMUSCULAR; INTRAVENOUS EVERY 6 HOURS PRN
Status: DISCONTINUED | OUTPATIENT
Start: 2023-02-22 | End: 2023-02-22

## 2023-02-22 RX ORDER — FAMOTIDINE 20 MG/1
20 TABLET, FILM COATED ORAL 2 TIMES DAILY
Status: DISCONTINUED | OUTPATIENT
Start: 2023-02-22 | End: 2023-02-23 | Stop reason: HOSPADM

## 2023-02-22 RX ORDER — FAMOTIDINE 20 MG/1
20 TABLET, FILM COATED ORAL ONCE
Status: COMPLETED | OUTPATIENT
Start: 2023-02-22 | End: 2023-02-22

## 2023-02-22 RX ORDER — ONDANSETRON 2 MG/ML
INJECTION INTRAMUSCULAR; INTRAVENOUS AS NEEDED
Status: DISCONTINUED | OUTPATIENT
Start: 2023-02-22 | End: 2023-02-22 | Stop reason: SURG

## 2023-02-22 RX ORDER — ROPIVACAINE HYDROCHLORIDE 2 MG/ML
INJECTION, SOLUTION EPIDURAL; INFILTRATION; PERINEURAL CONTINUOUS
Status: DISCONTINUED | OUTPATIENT
Start: 2023-02-22 | End: 2023-02-23 | Stop reason: HOSPADM

## 2023-02-22 RX ORDER — ACETAMINOPHEN 500 MG
1000 TABLET ORAL ONCE
Status: COMPLETED | OUTPATIENT
Start: 2023-02-22 | End: 2023-02-22

## 2023-02-22 RX ORDER — BUPIVACAINE HYDROCHLORIDE 5 MG/ML
INJECTION, SOLUTION PERINEURAL
Status: COMPLETED | OUTPATIENT
Start: 2023-02-22 | End: 2023-02-22

## 2023-02-22 RX ORDER — DOCUSATE SODIUM 100 MG/1
100 CAPSULE, LIQUID FILLED ORAL 2 TIMES DAILY PRN
Status: DISCONTINUED | OUTPATIENT
Start: 2023-02-22 | End: 2023-02-22

## 2023-02-22 RX ORDER — LIDOCAINE HYDROCHLORIDE 10 MG/ML
0.5 INJECTION, SOLUTION EPIDURAL; INFILTRATION; INTRACAUDAL; PERINEURAL ONCE AS NEEDED
Status: COMPLETED | OUTPATIENT
Start: 2023-02-22 | End: 2023-02-22

## 2023-02-22 RX ORDER — HYDROCODONE BITARTRATE AND ACETAMINOPHEN 10; 325 MG/1; MG/1
1 TABLET ORAL EVERY 6 HOURS PRN
Qty: 30 TABLET | Refills: 0 | Status: SHIPPED | OUTPATIENT
Start: 2023-02-22 | End: 2023-03-01 | Stop reason: SDUPTHER

## 2023-02-22 RX ORDER — MIDAZOLAM HYDROCHLORIDE 1 MG/ML
0.5 INJECTION INTRAMUSCULAR; INTRAVENOUS
Status: DISCONTINUED | OUTPATIENT
Start: 2023-02-22 | End: 2023-02-22 | Stop reason: HOSPADM

## 2023-02-22 RX ORDER — BUPIVACAINE HYDROCHLORIDE 2.5 MG/ML
INJECTION, SOLUTION EPIDURAL; INFILTRATION; INTRACAUDAL
Status: DISCONTINUED | OUTPATIENT
Start: 2023-02-22 | End: 2023-02-22 | Stop reason: SURG

## 2023-02-22 RX ORDER — MAGNESIUM HYDROXIDE 1200 MG/15ML
LIQUID ORAL AS NEEDED
Status: DISCONTINUED | OUTPATIENT
Start: 2023-02-22 | End: 2023-02-22 | Stop reason: HOSPADM

## 2023-02-22 RX ORDER — SODIUM CHLORIDE 9 MG/ML
40 INJECTION, SOLUTION INTRAVENOUS AS NEEDED
Status: DISCONTINUED | OUTPATIENT
Start: 2023-02-22 | End: 2023-02-23 | Stop reason: HOSPADM

## 2023-02-22 RX ORDER — SODIUM CHLORIDE 0.9 % (FLUSH) 0.9 %
10 SYRINGE (ML) INJECTION AS NEEDED
Status: DISCONTINUED | OUTPATIENT
Start: 2023-02-22 | End: 2023-02-22 | Stop reason: HOSPADM

## 2023-02-22 RX ORDER — LISINOPRIL 20 MG/1
20 TABLET ORAL NIGHTLY
Status: DISCONTINUED | OUTPATIENT
Start: 2023-02-22 | End: 2023-02-23 | Stop reason: HOSPADM

## 2023-02-22 RX ORDER — ONDANSETRON 4 MG/1
4 TABLET, FILM COATED ORAL EVERY 8 HOURS PRN
Qty: 15 TABLET | Refills: 1 | Status: SHIPPED | OUTPATIENT
Start: 2023-02-22

## 2023-02-22 RX ORDER — PROPOFOL 10 MG/ML
VIAL (ML) INTRAVENOUS AS NEEDED
Status: DISCONTINUED | OUTPATIENT
Start: 2023-02-22 | End: 2023-02-22 | Stop reason: SURG

## 2023-02-22 RX ORDER — MELOXICAM 15 MG/1
15 TABLET ORAL DAILY
Status: DISCONTINUED | OUTPATIENT
Start: 2023-02-22 | End: 2023-02-23 | Stop reason: HOSPADM

## 2023-02-22 RX ORDER — SODIUM CHLORIDE, SODIUM LACTATE, POTASSIUM CHLORIDE, CALCIUM CHLORIDE 600; 310; 30; 20 MG/100ML; MG/100ML; MG/100ML; MG/100ML
9 INJECTION, SOLUTION INTRAVENOUS CONTINUOUS
Status: DISCONTINUED | OUTPATIENT
Start: 2023-02-22 | End: 2023-02-23 | Stop reason: HOSPADM

## 2023-02-22 RX ORDER — DOCUSATE SODIUM 100 MG/1
100 CAPSULE, LIQUID FILLED ORAL 2 TIMES DAILY PRN
Qty: 60 CAPSULE | Refills: 0 | Status: SHIPPED | OUTPATIENT
Start: 2023-02-22

## 2023-02-22 RX ORDER — SODIUM CHLORIDE 0.9 % (FLUSH) 0.9 %
3 SYRINGE (ML) INJECTION EVERY 12 HOURS SCHEDULED
Status: DISCONTINUED | OUTPATIENT
Start: 2023-02-22 | End: 2023-02-23 | Stop reason: HOSPADM

## 2023-02-22 RX ORDER — CHOLECALCIFEROL (VITAMIN D3) 125 MCG
10 CAPSULE ORAL NIGHTLY PRN
Status: DISCONTINUED | OUTPATIENT
Start: 2023-02-22 | End: 2023-02-23 | Stop reason: HOSPADM

## 2023-02-22 RX ORDER — ONDANSETRON 4 MG/1
4 TABLET, FILM COATED ORAL EVERY 6 HOURS PRN
Status: DISCONTINUED | OUTPATIENT
Start: 2023-02-22 | End: 2023-02-23 | Stop reason: HOSPADM

## 2023-02-22 RX ORDER — EPHEDRINE SULFATE 50 MG/ML
INJECTION INTRAVENOUS AS NEEDED
Status: DISCONTINUED | OUTPATIENT
Start: 2023-02-22 | End: 2023-02-22 | Stop reason: SURG

## 2023-02-22 RX ORDER — LIDOCAINE HYDROCHLORIDE 10 MG/ML
INJECTION, SOLUTION EPIDURAL; INFILTRATION; INTRACAUDAL; PERINEURAL AS NEEDED
Status: DISCONTINUED | OUTPATIENT
Start: 2023-02-22 | End: 2023-02-22 | Stop reason: SURG

## 2023-02-22 RX ORDER — OXYCODONE HYDROCHLORIDE 5 MG/1
5 TABLET ORAL EVERY 4 HOURS PRN
Status: DISCONTINUED | OUTPATIENT
Start: 2023-02-22 | End: 2023-02-23

## 2023-02-22 RX ORDER — ACETAMINOPHEN 500 MG
1000 TABLET ORAL EVERY 6 HOURS
Status: DISCONTINUED | OUTPATIENT
Start: 2023-02-22 | End: 2023-02-23 | Stop reason: HOSPADM

## 2023-02-22 RX ORDER — ASPIRIN 325 MG
325 TABLET, DELAYED RELEASE (ENTERIC COATED) ORAL DAILY
Status: DISCONTINUED | OUTPATIENT
Start: 2023-02-23 | End: 2023-02-23 | Stop reason: HOSPADM

## 2023-02-22 RX ORDER — SENNOSIDES 8.6 MG
650 CAPSULE ORAL EVERY 8 HOURS
Qty: 90 TABLET | Refills: 0 | Status: SHIPPED | OUTPATIENT
Start: 2023-02-22 | End: 2023-03-21 | Stop reason: SDUPTHER

## 2023-02-22 RX ORDER — ONDANSETRON 2 MG/ML
4 INJECTION INTRAMUSCULAR; INTRAVENOUS EVERY 6 HOURS PRN
Status: DISCONTINUED | OUTPATIENT
Start: 2023-02-22 | End: 2023-02-23 | Stop reason: HOSPADM

## 2023-02-22 RX ORDER — OXYCODONE HCL 10 MG/1
10 TABLET, FILM COATED, EXTENDED RELEASE ORAL ONCE
Status: COMPLETED | OUTPATIENT
Start: 2023-02-22 | End: 2023-02-22

## 2023-02-22 RX ADMIN — PROPOFOL 40 MG: 10 INJECTION, EMULSION INTRAVENOUS at 07:30

## 2023-02-22 RX ADMIN — PROPOFOL 100 MCG/KG/MIN: 10 INJECTION, EMULSION INTRAVENOUS at 07:41

## 2023-02-22 RX ADMIN — FAMOTIDINE 20 MG: 20 TABLET ORAL at 07:09

## 2023-02-22 RX ADMIN — TRANEXAMIC ACID 1000 MG: 10 INJECTION, SOLUTION INTRAVENOUS at 07:39

## 2023-02-22 RX ADMIN — FAMOTIDINE 20 MG: 20 TABLET ORAL at 13:26

## 2023-02-22 RX ADMIN — ACETAMINOPHEN 1000 MG: 500 TABLET ORAL at 07:08

## 2023-02-22 RX ADMIN — SODIUM CHLORIDE, POTASSIUM CHLORIDE, SODIUM LACTATE AND CALCIUM CHLORIDE 9 ML/HR: 600; 310; 30; 20 INJECTION, SOLUTION INTRAVENOUS at 07:09

## 2023-02-22 RX ADMIN — ACETAMINOPHEN 1000 MG: 500 TABLET ORAL at 13:26

## 2023-02-22 RX ADMIN — Medication 3 ML: at 20:56

## 2023-02-22 RX ADMIN — EPHEDRINE SULFATE 5 MG: 50 INJECTION INTRAVENOUS at 08:47

## 2023-02-22 RX ADMIN — DEXAMETHASONE SODIUM PHOSPHATE 8 MG: 4 INJECTION, SOLUTION INTRAMUSCULAR; INTRAVENOUS at 07:50

## 2023-02-22 RX ADMIN — LISINOPRIL 20 MG: 20 TABLET ORAL at 20:56

## 2023-02-22 RX ADMIN — VANCOMYCIN HYDROCHLORIDE 1500 MG: 10 INJECTION, POWDER, LYOPHILIZED, FOR SOLUTION INTRAVENOUS at 20:57

## 2023-02-22 RX ADMIN — HYDROCODONE BITARTRATE AND ACETAMINOPHEN 1 TABLET: 10; 325 TABLET ORAL at 16:34

## 2023-02-22 RX ADMIN — EPHEDRINE SULFATE 5 MG: 50 INJECTION INTRAVENOUS at 08:45

## 2023-02-22 RX ADMIN — Medication 10 MG: at 20:56

## 2023-02-22 RX ADMIN — POTASSIUM CHLORIDE AND SODIUM CHLORIDE 50 ML/HR: 900; 150 INJECTION, SOLUTION INTRAVENOUS at 12:11

## 2023-02-22 RX ADMIN — VANCOMYCIN HYDROCHLORIDE 1500 MG: 10 INJECTION, POWDER, LYOPHILIZED, FOR SOLUTION INTRAVENOUS at 07:09

## 2023-02-22 RX ADMIN — MELOXICAM 15 MG: 15 TABLET ORAL at 12:11

## 2023-02-22 RX ADMIN — Medication 1000 MG: at 10:35

## 2023-02-22 RX ADMIN — OXYCODONE HYDROCHLORIDE 10 MG: 10 TABLET, FILM COATED, EXTENDED RELEASE ORAL at 07:09

## 2023-02-22 RX ADMIN — BUPIVACAINE HYDROCHLORIDE 1.8 ML: 5 INJECTION, SOLUTION PERINEURAL at 07:32

## 2023-02-22 RX ADMIN — ACETAMINOPHEN 1000 MG: 500 TABLET ORAL at 20:56

## 2023-02-22 RX ADMIN — LIDOCAINE HYDROCHLORIDE 0.5 ML: 10 INJECTION, SOLUTION EPIDURAL; INFILTRATION; INTRACAUDAL; PERINEURAL at 07:09

## 2023-02-22 RX ADMIN — LIDOCAINE HYDROCHLORIDE 50 MG: 10 INJECTION, SOLUTION EPIDURAL; INFILTRATION; INTRACAUDAL; PERINEURAL at 07:30

## 2023-02-22 RX ADMIN — OXYCODONE HYDROCHLORIDE 5 MG: 5 TABLET ORAL at 20:55

## 2023-02-22 RX ADMIN — FAMOTIDINE 20 MG: 20 TABLET ORAL at 21:03

## 2023-02-22 RX ADMIN — ONDANSETRON 4 MG: 2 INJECTION INTRAMUSCULAR; INTRAVENOUS at 09:44

## 2023-02-22 RX ADMIN — TRANEXAMIC ACID 1000 MG: 10 INJECTION, SOLUTION INTRAVENOUS at 09:31

## 2023-02-22 RX ADMIN — OXYCODONE HYDROCHLORIDE 5 MG: 5 TABLET ORAL at 13:30

## 2023-02-22 RX ADMIN — HYDROCODONE BITARTRATE AND ACETAMINOPHEN 1 TABLET: 10; 325 TABLET ORAL at 22:22

## 2023-02-22 RX ADMIN — Medication 3 ML: at 12:13

## 2023-02-22 RX ADMIN — BUPIVACAINE HYDROCHLORIDE 20 ML: 2.5 INJECTION, SOLUTION EPIDURAL; INFILTRATION; INTRACAUDAL at 10:24

## 2023-02-22 NOTE — INTERVAL H&P NOTE
Pre-Op H&P  Ashok Chnug  [id number]  1955    Full history and physical from office visit 2/21/2023 up-to-date.     Review of Systems:  General ROS: negative for chills, fever or skin lesions;  No changes since last office visit.  Neg for recent sick exposure.  Patient denies taking any anticoagulant or antiplatelet medications.  Cardiovascular ROS: no chest pain or dyspnea on exertion  Respiratory ROS: no cough, shortness of breath, or wheezing    Allergies: Denies allergy to latex or contrast dye.  Allergies   Allergen Reactions    Penicillins Anaphylaxis and Hives    Sulfa Antibiotics Hives       Immunization History:  Influenza: Yes  Pneumococcal: Yes  Tetanus: Yes    Vitals:           /90 (BP Location: Right arm, Patient Position: Lying)   Pulse 60   Temp 98.4 °F (36.9 °C) (Temporal)   Resp 18   SpO2 97%     Physical Exam:  General Appearance:    Alert, cooperative, no distress, appears stated age   Head:    Normocephalic, without obvious abnormality, atraumatic   Lungs:     Clear to auscultation bilaterally, respirations unlabored    Heart:   Regular rate and rhythm, S1 and S2 normal, no murmur, rub    or gallop    Abdomen:    Soft, nontender.  +bowel sounds   Breast Exam:    deferred   Genitalia:    deferred   Extremities:   Extremities normal, atraumatic, no cyanosis or edema   Skin:   Skin color, texture, turgor normal, no rashes or lesions   Neurologic:   Grossly intact   Results Review  LABS:  Lab Results   Component Value Date    WBC 6.96 02/10/2023    HGB 15.8 02/10/2023    HCT 46.7 02/10/2023    MCV 90.0 02/10/2023     02/10/2023    NEUTROABS 5.03 02/10/2023    GLUCOSE 114 (H) 02/10/2023    BUN 19 02/10/2023    CREATININE 0.99 02/10/2023    EGFRIFNONA 82 11/18/2021     02/10/2023    K 5.3 (H) 02/10/2023     02/10/2023    CO2 29.0 02/10/2023    PHOS 2.4 (L) 10/05/2022    CALCIUM 11.4 (H) 02/10/2023    ALBUMIN 4.80 10/05/2022       RADIOLOGY:  No radiology results for  the last 3 days     I reviewed the patient's new clinical results.  CBC and CHEM profile from 2/10/2023 reviewed and available within patient's chart.    Cancer Staging (if applicable)  Cancer Patient: __ yes __no __unknown; If yes, clinical stage T:__ N:__M:__, stage group or __N/A    Impression: Patient presents with osteoarthritis of the right knee.    Plan: Dr. Kauffman will perform a total knee arthroplasty-RIGHT.       Mauri Diaz PA-C   02/22/23   7:09 AM EST

## 2023-02-22 NOTE — PLAN OF CARE
Goal Outcome Evaluation:  Plan of Care Reviewed With: patient           Outcome Evaluation: VSS. Alert, oriented x4. Pain controlled with PO pain medication. Voiding well per urinal.  Ambulates assist x1. Ambulated in hallway.

## 2023-02-22 NOTE — ANESTHESIA POSTPROCEDURE EVALUATION
Patient: Ashok Chung    Procedure Summary     Date: 02/22/23 Room / Location:  [facility]    Anesthesia Start: 0726 Anesthesia Stop: 1023    Procedure: TOTAL KNEE ARTHROPLASTY- RIGHT (Right: Knee) Diagnosis:       Primary osteoarthritis of right knee      (Primary osteoarthritis of right knee [M17.11])    Surgeons: Connor Anderson MD Provider: Dallas Juárez MD    Anesthesia Type: spinal ASA Status: 2          Anesthesia Type: spinal    Vitals  Vitals Value Taken Time   /71 02/22/23 1020   Temp     Pulse 61 02/22/23 1022   Resp     SpO2 96 % 02/22/23 1022   Vitals shown include unvalidated device data.        Post Anesthesia Care and Evaluation    Patient location during evaluation: PACU  Patient participation: complete - patient participated  Level of consciousness: awake and alert  Pain management: adequate    Airway patency: patent  Anesthetic complications: No anesthetic complications  PONV Status: none  Cardiovascular status: hemodynamically stable and acceptable  Respiratory status: nonlabored ventilation, acceptable and nasal cannula  Hydration status: acceptable

## 2023-02-22 NOTE — DISCHARGE PLACEMENT REQUEST
"Ashok Muniz (67 y.o. Male)   Marvin Montalvo, RN Case Manager  [phone]    Date of Birth   1955    Social Security Number       Address   [place] [address]    Home Phone   [phone]    MRN   [id number]       [facility]    Marital Status                               Admission Date   2/22/23    Admission Type   Elective    Admitting Provider   Connor Anderson MD    Attending Provider   Connor Anderson MD    Department, Room/Bed   [facility] 3G, S358/1       Discharge Date       Discharge Disposition       Discharge Destination                               Attending Provider: Connor Anderson MD    Allergies: Penicillins, Sulfa Antibiotics    Isolation: None   Infection: None   Code Status: Not on file    Ht: 170.2 cm (67.01\")   Wt: 99.3 kg (219 lb)    Admission Cmt: None   Principal Problem: OA (osteoarthritis) of knee [M17.9]                 Active Insurance as of 2/22/2023     Primary Coverage     Payor Plan Insurance Group Employer/Plan Group    MEDICARE MEDICARE A & B      Payor Plan Address Payor Plan Phone Number Payor Plan Fax Number Effective Dates    [address] [phone]  5/1/2018 - None Entered    [facility] [id number]       Subscriber Name Subscriber Birth Date Member ID       ASHOK MUNIZ 1955 [id number]           Secondary Coverage     Payor Plan Insurance Group Employer/Plan Group    [id number]     Payor Plan Address Payor Plan Phone Number Payor Plan Fax Number Effective Dates    [address]   1/1/2017 - None Entered    [facility] [id number]       Subscriber Name Subscriber Birth Date Member ID       ASHOK MUNIZ 1955 [id number]                 Emergency Contacts      (Rel.) Home Phone Work Phone Mobile Phone    ANNA MUNIZA (Spouse) -- -- [phone]               History & Physical      Mauri Diaz PA-C at 02/22/23 0708     Attestation signed by " Connor Anderson MD at 02/22/23 5591    I have examined the patient and reviewed the above information.   I agree with the treatment plan and have discussed the plan with the patient who agrees to proceed.                    Pre-Op H&P  Ashok Chung  [id number]  1955    Full history and physical from office visit 2/21/2023 up-to-date.     Review of Systems:  General ROS: negative for chills, fever or skin lesions;  No changes since last office visit.  Neg for recent sick exposure.  Patient denies taking any anticoagulant or antiplatelet medications.  Cardiovascular ROS: no chest pain or dyspnea on exertion  Respiratory ROS: no cough, shortness of breath, or wheezing    Allergies: Denies allergy to latex or contrast dye.  Allergies   Allergen Reactions   • Penicillins Anaphylaxis and Hives   • Sulfa Antibiotics Hives       Immunization History:  Influenza: Yes  Pneumococcal: Yes  Tetanus: Yes    Vitals:           /90 (BP Location: Right arm, Patient Position: Lying)   Pulse 60   Temp 98.4 °F (36.9 °C) (Temporal)   Resp 18   SpO2 97%     Physical Exam:  General Appearance:    Alert, cooperative, no distress, appears stated age   Head:    Normocephalic, without obvious abnormality, atraumatic   Lungs:     Clear to auscultation bilaterally, respirations unlabored    Heart:   Regular rate and rhythm, S1 and S2 normal, no murmur, rub    or gallop    Abdomen:    Soft, nontender.  +bowel sounds   Breast Exam:    deferred   Genitalia:    deferred   Extremities:   Extremities normal, atraumatic, no cyanosis or edema   Skin:   Skin color, texture, turgor normal, no rashes or lesions   Neurologic:   Grossly intact   Results Review  LABS:  Lab Results   Component Value Date    WBC 6.96 02/10/2023    HGB 15.8 02/10/2023    HCT 46.7 02/10/2023    MCV 90.0 02/10/2023     02/10/2023    NEUTROABS 5.03 02/10/2023    GLUCOSE 114 (H) 02/10/2023    BUN 19 02/10/2023    CREATININE 0.99 02/10/2023     EGFRIFNONA 82 11/18/2021     02/10/2023    K 5.3 (H) 02/10/2023     02/10/2023    CO2 29.0 02/10/2023    PHOS 2.4 (L) 10/05/2022    CALCIUM 11.4 (H) 02/10/2023    ALBUMIN 4.80 10/05/2022       RADIOLOGY:  No radiology results for the last 3 days     I reviewed the patient's new clinical results.  CBC and CHEM profile from 2/10/2023 reviewed and available within patient's chart.    Cancer Staging (if applicable)  Cancer Patient: __ yes __no __unknown; If yes, clinical stage T:__ N:__M:__, stage group or __N/A    Impression: Patient presents with osteoarthritis of the right knee.    Plan: Dr. Kauffman will perform a total knee arthroplasty-RIGHT.       Mauri Diaz PA-C   02/22/23   7:09 AM EST           Electronically signed by Connor Anderson MD at 02/22/23 4358   Source Note              [facility]       Subjective     CC: Follow-up (3 month follow up -- Primary osteoarthritis of right knee)      HPI    Ashok Chung is a 67 y.o. male.  Patient is here today with his wife for his preop appointment for right TKA scheduled for tomorrow.    Overall, patient's symptoms are unchanged from his last visit on 11/17/2022.    ROS:    Constiutional:Pt denies fever, chills, nausea, or vomiting.  MSK:as above       Objective      Past Medical History  Past Medical History:   Diagnosis Date   • Arthritis    • Elevated cholesterol    • Hearing loss    • History of COVID-19 02/2021    blood test showed positive antibodies in Feb 2021 (no symptoms)   • History of staph infection 2004    Lost two vertebrae as a result of staph infection   • Hypercalcemia    • Hypertension    • Staph infection 2000    C2-C3   • Wears glasses      Social History     Socioeconomic History   • Marital status:    Tobacco Use   • Smoking status: Never   • Smokeless tobacco: Former     Types: Chew     Quit date: 1986   Vaping Use   • Vaping Use: Never used   Substance and Sexual Activity   •  "Alcohol use: Yes     Alcohol/week: 5.0 standard drinks     Types: 4 Cans of beer, 1 Shots of liquor per week     Comment: SOCIALLY   • Drug use: Never   • Sexual activity: Defer          Physical Exam  /70   Ht 170.2 cm (67.01\")   Wt 99.3 kg (219 lb)   BMI 34.29 kg/m²     Body mass index is 34.29 kg/m².    Patient is well nourished and well developed.        Ortho Exam      Musculoskeletal:  Global Assessment:  Overall assessment of Lower Extremity Muscle Strength and Tone:  Right quadriceps--5/5   Right hamstrings--5/5       Right tibialis anterior--5/5  Right gastroc-soleus--5/5  Right EHL --5/5    Lower Extremity:    Inspection and Palpation:  Right knee:  Tenderness:  Over the medial joint line and moderate severity  Effusion:  1+  Crepitus:  Positive  Pulses:  2+  Ecchymosis:  None  Warmth:  None     ROM:  Right:  Extension: 5    Flexion:120    Instability:    Right:  Lachman Test:  Negative   Varus stress test negative   Valgus stress test negative    Deformities/Malalignments/Discrepancies:    Left:  No deformities   Right:  Genu Varum    Functional Testing:  Te's test:  Negative  Patella grind test:  Positive  Q-angle:  normal          Imaging/Labs/EMG Reviewed:  Imaging Results (Last 24 Hours)     ** No results found for the last 24 hours. **            Assessment     Assessment:  1. Primary osteoarthritis of right knee        Plan:  1. Recommend over the counter anti-inflammatories for pain and/or swelling  2. Right knee arthritis--plans for right total knee arthroplasty tomorrow.  We again answered any questions and concerns that he and his wife had regarding the surgery.  He will start therapy the Friday after his surgery.  We will use vancomycin preoperatively make sure the full dose is infused before we make incision due to his history of staph.  Full dose aspirin daily for DVT prophylaxis.  He will stay 23 hours.  We will make sure that he is on the orthopedic floor if at all possible " "and gets informative and aggressive physical therapy      Connor Anderson MD  02/21/23  13:01 EST      Dictated Utilizing Dragon Dictation.    Electronically signed by Connor Anderson MD at 02/21/23 1303             Connor Anderson MD at 02/21/23 1050              [facility]       Subjective     CC: Follow-up (3 month follow up -- Primary osteoarthritis of right knee)      IVAN Chung is a 67 y.o. male.  Patient is here today with his wife for his preop appointment for right TKA scheduled for tomorrow.    Overall, patient's symptoms are unchanged from his last visit on 11/17/2022.    ROS:    Constiutional:Pt denies fever, chills, nausea, or vomiting.  MSK:as above       Objective      Past Medical History  Past Medical History:   Diagnosis Date   • Arthritis    • Elevated cholesterol    • Hearing loss    • History of COVID-19 02/2021    blood test showed positive antibodies in Feb 2021 (no symptoms)   • History of staph infection 2004    Lost two vertebrae as a result of staph infection   • Hypercalcemia    • Hypertension    • Staph infection 2000    C2-C3   • Wears glasses      Social History     Socioeconomic History   • Marital status:    Tobacco Use   • Smoking status: Never   • Smokeless tobacco: Former     Types: Chew     Quit date: 1986   Vaping Use   • Vaping Use: Never used   Substance and Sexual Activity   • Alcohol use: Yes     Alcohol/week: 5.0 standard drinks     Types: 4 Cans of beer, 1 Shots of liquor per week     Comment: SOCIALLY   • Drug use: Never   • Sexual activity: Defer          Physical Exam  /70   Ht 170.2 cm (67.01\")   Wt 99.3 kg (219 lb)   BMI 34.29 kg/m²     Body mass index is 34.29 kg/m².    Patient is well nourished and well developed.        Ortho Exam      Musculoskeletal:  Global Assessment:  Overall assessment of Lower Extremity Muscle Strength and Tone:  Right quadriceps--5/5   Right hamstrings--5/5     "   Right tibialis anterior--5/5  Right gastroc-soleus--5/5  Right EHL --5/5    Lower Extremity:    Inspection and Palpation:  Right knee:  Tenderness:  Over the medial joint line and moderate severity  Effusion:  1+  Crepitus:  Positive  Pulses:  2+  Ecchymosis:  None  Warmth:  None     ROM:  Right:  Extension: 5    Flexion:120    Instability:    Right:  Lachman Test:  Negative   Varus stress test negative   Valgus stress test negative    Deformities/Malalignments/Discrepancies:    Left:  No deformities   Right:  Genu Varum    Functional Testing:  Te's test:  Negative  Patella grind test:  Positive  Q-angle:  normal          Imaging/Labs/EMG Reviewed:  Imaging Results (Last 24 Hours)     ** No results found for the last 24 hours. **            Assessment     Assessment:  1. Primary osteoarthritis of right knee        Plan:  3. Recommend over the counter anti-inflammatories for pain and/or swelling  4. Right knee arthritis--plans for right total knee arthroplasty tomorrow.  We again answered any questions and concerns that he and his wife had regarding the surgery.  He will start therapy the Friday after his surgery.  We will use vancomycin preoperatively make sure the full dose is infused before we make incision due to his history of staph.  Full dose aspirin daily for DVT prophylaxis.  He will stay 23 hours.  We will make sure that he is on the orthopedic floor if at all possible and gets informative and aggressive physical therapy      Connor Anderson MD  02/21/23  13:01 EST      Dictated Utilizing Dragon Dictation.    Electronically signed by Connor Anderson MD at 02/21/23 1303          Operative/Procedure Notes (last 24 hours)      Connor Anderson MD at 02/22/23 0812          Orthopaedics Operative Report    PREOPERATIVE DIAGNOSIS: Primary osteoarthritis right knee    POSTOPERATIVE DIAGNOSIS: Same    PROCEDURE PERFORMED: Right total knee arthroplasty, CPT 35714    SURGEON: Connor  Thor Anderson MD    ANESTHESIA:  Spinal with Block    STAFF:  Circulator: Shasta Tarango RN; Lawanda Wisdom RN  Scrub Person: Barry Oscar  Vendor Representative: Thom Rodriguez  Nursing Assistant: Keri Ford PCT; Marion Mckeon PCT  Assistant: Mary Kay Doll PA-C    TOURNIQUET TIME: 79 minutes    ESTIMATED BLOOD LOSS: 50 mL    COMPLICATIONS: None apparent.    RELEASES: None required after bone cuts made and osteophytes removed    APPROACH:  Medial parapatellar    TXA: IV    IMPLANTS:     Implant Name Type Inv. Item Serial No.  Lot No. LRB No. Used Action   DEV CONTRL TISS STRATAFIX SYMM PDS PLUS AIDA CT-1 45CM - [id number] Implant DEV CONTRL TISS STRATAFIX SYMM PDS PLUS AIDA CT-1 45CM  [employer]  Right 1 Implanted   CMT BONE R 1X40 - [id number] Implant CMT BONE R 1X40  [employer] [id number] Right 2 Implanted   PAT KN PERSONA ALLPOLY CMT 8.5X32MM - [id number] Implant PAT KN PERSONA ALLPOLY CMT 8.5X32MM  [employer] [id number] Right 1 Implanted   COMP FEM/KN PERSONA CR CMT COCR STD SZ7 RT - [id number] Implant COMP FEM/KN PERSONA CR CMT COCR STD SZ7 RT  [employer] [id number] Right 1 Implanted   STEM TIB/KN PERSONA CMT 5D SZF RT - [id number] Implant STEM TIB/KN PERSONA CMT 5D SZF RT  [employer] [id number] Right 1 Implanted   ART/SRF KN PERSONA PS EF MC 6TO7 10MM RT - [id number] Implant ART/SRF KN PERSONA PS EF MC 6TO7 10MM RT  [employer] [id number] Right 1 Implanted       Marisela Persona CR femur size 7 standard  size F tibia  32 patella button   Size 10 Vitamin E Medial Congruent highly crosslinked polyethylene articular surface    PREOPERATIVE ANTIBIOTICS: Vancomycin    REFERRING PHYSICIAN: Mraina Cobb PA-C    INDICATIONS: Failure of nonoperative treatment including injections, bracing, and activity modification.    DESCRIPTION OF PROCEDURE: After informed consent was obtained, the patient was taken to the operating room. The patient was given a dose of IV  vancomycin prior to incision. After the smooth induction of spinal right anesthesia, the patient’s right lower extremity was prepped and draped in the usual fashion for this type of procedure. We performed a timeout to verify site and the procedure to be performed.  We began with exsanguination of the right lower extremity using an Esmarch bandage and inflation of tourniquet to 300 mmHg. We made our standard midline incision and medial parapatellar approach. We took 20% of the quadriceps tendon medially and a sleeve of tissue around the patella for repair as well a sliver of patellar tendon. Dissected extra-ostially but subperiosteally on the medial side taking off the superficial and deep MCL from the proximal tibia. These were protected throughout the procedure. Visible medial meniscus was excised. The retropatellar fat pad was excised. The ACL and visible lateral meniscus was excised as well as the synovium from the anterior surface of the distal femur.  Osteophytes were removed from the distal femur and proximal tibia at this point.       We then everted the patella and this was resurfaced, restoring patellar height. The patellar height was 23-1/2 mm preoperatively and we cut the patella to 14 mm and sized the patella to a 32.  The lugholes were drilled and the component slightly medialized.      We then turned our attention to preparation of the femur. We placed our intramedullary distal femoral guide into place set on 4 degrees of valgus and due to preoperative flexion contracture, we took an extra 2 mm of bone off of the distal femur. The distal femur was cut protecting medial and lateral structures. We then marked Silva's line and sized our femur to be a size 7. We marked 5° of external rotation which correlated well with [place]'s line. We then secured this block into place and made our anterior, posterior, and chamfer cuts. The pins were removed and the bone cuts were completed and freshened up. We  then excised our posterior cruciate ligament and exposed the proximal tibia. We took 10 mm of bone off the lateral side. We protected medial and lateral structures during our cut as well as the patellar tendon. We completed the cut. We sized the tibia to be a size F. We then removed meniscus from the back of the knee. We used our flexion extension blocks to make sure our flexion and extension gaps were acceptable. We then completed the preparation of the tibia, aiming the center of the baseplate at the medial third of the tibial tubercle.     We then completed the preparation of our femoral component.  We then trialed and were stable on the medial side at 0°, 30°, 60°, 90° and 120°. The lug holes were then drilled.  We then cemented these components into place. Once the cement had cured, we removed excess cement. We then trialed and a size 10 polyethylene spacer had good stability and full range of motion.  We then deflated the tourniquet prior to placement of our final polyethylene spacer. Any bleeding seen in the back of the knee was controlled. The final spacer was then secured into place. We then used a final irrigation consisting of Irricept and diluted Betadine throughout the knee. We obtained hemostasis. We then closed our deep parapatellar approach using 0 Ethibond in an interrupted fashion. We then injected our periarticular injection into the knee which consisted of 20 ml of NS, 20 ml of 0.5% lidocaine with epi, 20 ml of 0.5% bupivicaine, 30 mg of toradol, and 8 mg of morphine. We then closed our subcutaneous layer using running 2-0 Vicryl and interrupted 2-0 Vicryl. We closed our skin using a running 3-0 Monocryl. We placed an Exofin Fusion dressing system over the incision and took down the drapes. The patient was transferred back to their hospital bed and then taken to the recovery room in stable condition. All counts were correct postoperatively. I performed the case.    First assistant: Mary Kay  CARRINGTON Doll    The skilled assistance of the above noted first assistant was necessary during this complex surgical procedure.  The surgical assistant assisted with every aspect of the operation including, but not limited to, proper and safe positioning of the patient, obtaining adequate surgical exposure, manipulation of surgical instruments to make the proper bone cuts, cementing of the final implants, the continual process of hemostasis during the procedure itself in addition to surgical wound closure and removal of the patient from the operating table and returning the patient back to the [facility].  The assistance of the surgical assistant allowed me to perform the most sensitive and technical potions of this operation using 2 hands, thus enhancing efficiency and patient safety.  This would not be possible without the help of a skilled assistant familiar with the procedure and capable of safely performing the aforementioned tasks.       POSTOPERATIVE PLAN:  1. Weight bearing as tolerated right lower extremity.  2. The patient will receive an indwelling low femoral nerve block in the recovery room.  3. 24-hours of IV Vancomycin.  4.  Full dose aspirin daily beginning tomorrow morning and continuing for 6 weeks for DVT prophylaxis.  5. The patient will be under the medical care of Dr. Moody  6.  Begin outpatient physical therapy at [facility] in [place] on Friday.    Connor Anderson M.D.*    Electronically signed by Connor Anderson MD at 02/22/23 1007       Physician Progress Notes (last 24 hours)  Notes from 02/21/23 1222 through 02/22/23 1222   No notes of this type exist for this encounter.         Physical Therapy Notes (last 24 hours)  Notes from 02/21/23 1222 through 02/22/23 1222   No notes exist for this encounter.

## 2023-02-22 NOTE — ANESTHESIA PROCEDURE NOTES
Spinal Block    Pre-sedation assessment completed: 2/22/2023 7:30 AM    Patient reassessed immediately prior to procedure    Patient location during procedure: OR  Indication:at surgeon's request  Performed By  Anesthesiologist: Dallas Juárez MD  CRNA/CAA: Ruddy Rose, CRNASRNA: Rosa Elena Llamas SRNA  Preanesthetic Checklist  Completed: patient identified, IV checked, site marked, risks and benefits discussed, surgical consent, monitors and equipment checked, pre-op evaluation and timeout performed  Spinal Block Prep:  Patient Position:sitting  Sterile Tech:cap, gloves, sterile barriers and mask  Prep:Chloraprep  Patient Monitoring:blood pressure monitoring, continuous pulse oximetry and EKG    Spinal Block Procedure  Approach:midline  Guidance:landmark technique and palpation technique  Location:L4-L5  Needle Type:Ros  Needle Gauge:25 G  Placement of Spinal needle event:cerebrospinal fluid aspirated  Paresthesia: no  Fluid Appearance:clear  Medications: bupivacaine (MARCAINE) 0.5 % injection - Injection   1.8 mL - 2/22/2023 7:32:00 AM   Post Assessment  Patient Tolerance:patient tolerated the procedure well with no apparent complications  Complications no  Additional Notes  Procedure:  Pt assisted to sitting position, with legs in position of comfort over side of bed.  Pt. instructed in optimal spine presentation, the spine was prepped/ Draped and the skin at insertion site was anesthetized with 1% Lidocaine 2 ml.  The spinal needle was then advanced until CSF flow was obtained and LA was injected:

## 2023-02-22 NOTE — H&P
Patient Name: Ashok Chung  MRN: [id number]  : 1955  DOS: 2023    Attending: Connor Anderson,*    Primary Care Provider: Marina Cobb PA-C      Chief complaint: Right knee pain    Subjective   Patient is a pleasant 67 y.o. male presented for scheduled surgery by Dr. Anderson.  He underwent right total knee arthroplasty under spinal anesthesia.  He tolerated surgery well and was admitted for further medical management.  His knee has been painful for many years.  He denies use of assistive device for ambulation or recent falls.    He is known to us from previous admission on 2021 for left knee replacement; which she recovered well.    When seen postop he is doing well.  His pain is well controlled.  He denies nausea, shortness of breath or chest pain.  No history of DVT or PE.    Allergies:  Allergies   Allergen Reactions   • Penicillins Anaphylaxis and Hives   • Sulfa Antibiotics Hives       Meds:  Medications Prior to Admission   Medication Sig Dispense Refill Last Dose   • cetirizine (zyrTEC) 10 MG tablet Take 10 mg by mouth Daily.   2023 at 2200   • Cholecalciferol (Vitamin D3) 50 MCG ( UT) tablet Take 4,000 Units by mouth Daily.   2023   • diphenhydrAMINE HCl (BENADRYL ALLERGY PO) Take  by mouth.   Past Week   • famotidine (PEPCID) 20 MG tablet Take 20 mg by mouth 2 (Two) Times a Day.   Past Week   • lisinopril (PRINIVIL,ZESTRIL) 20 MG tablet Take 20 mg by mouth Every Night.   2023   • Melatonin 10 MG tablet Take  by mouth.   2023         History:   Past Medical History:   Diagnosis Date   • Arthritis    • Elevated cholesterol    • Hearing loss    • History of COVID-19 2021    blood test showed positive antibodies in 2021 (no symptoms)   • History of staph infection     Lost two vertebrae as a result of staph infection   • Hypercalcemia    • Hypertension    • Staph infection     C2-C3   • Wears glasses      Past Surgical History:    Procedure Laterality Date   • APPENDECTOMY     • COLONOSCOPY     • HERNIA REPAIR Right     INGUINAL   • SPINE SURGERY     • TONSILLECTOMY     • TOTAL KNEE ARTHROPLASTY Left 2021    Procedure: TOTAL KNEE ARTHROPLASTY LEFT;  Surgeon: Connor Anderson MD;  Location: [facility];  Service: Orthopedics;  Laterality: Left;     Family History   Problem Relation Age of Onset   • Heart disease Mother    • Arthritis Mother    • Heart disease Father    • Heart disease Sister    • Asthma Sister    • No Known Problems Brother    • No Known Problems Sister      Social History     Tobacco Use   • Smoking status: Never   • Smokeless tobacco: Former     Types: Chew     Quit date:    Vaping Use   • Vaping Use: Never used   Substance Use Topics   • Alcohol use: Yes     Alcohol/week: 5.0 standard drinks     Types: 4 Cans of beer, 1 Shots of liquor per week     Comment: SOCIALLY   • Drug use: Never   He is  with 2 children.  He is retired from a  home.    Review of Systems  Pertinent items are noted in HPI, all other systems reviewed and negative    Vital Signs  /84 (BP Location: Right arm, Patient Position: Lying)   Pulse 65   Temp 97.5 °F (36.4 °C) (Oral)   Resp 20   SpO2 99%     Physical Exam:    General Appearance:    Alert, cooperative, in no acute distress   Head:    Normocephalic, without obvious abnormality, atraumatic   Eyes:            Lids and lashes normal, conjunctivae and sclerae normal, no   icterus, no pallor, corneas clear,    Ears:    Ears appear intact with no abnormalities noted   Throat:   No oral lesions, no thrush, oral mucosa moist   Neck:   No adenopathy, supple, trachea midline, no thyromegaly    Lungs:     Clear to auscultation,respirations regular, even and unlabored    Heart:    Regular rhythm and normal rate, normal S1 and S2, no murmur, no gallop   Abdomen:     Normal bowel sounds, no masses, no organomegaly, soft non-tender, non-distended, no guarding, no rebound   tenderness   Genitalia:    Deferred   Extremities:  Right knee Ace wrap CDI.  Nerve block present.   Pulses:   Pulses palpable and equal bilaterally   Skin:   No bleeding, bruising or rash   Neurologic:   Cranial nerves 2 - 12 grossly intact. Flexion and dorsiflexion intact bilateral feet.        I reviewed the patient's new clinical results.     Lab Results   Component Value Date    HGBA1C 5.70 (H) 02/10/2023      Latest Reference Range & Units 02/10/23 12:45   Glucose 65 - 99 mg/dL 114 (H)   Sodium 136 - 145 mmol/L 139   Potassium 3.5 - 5.2 mmol/L 5.3 (H)   CO2 22.0 - 29.0 mmol/L 29.0   Chloride 98 - 107 mmol/L 103   Anion Gap 5.0 - 15.0 mmol/L 7.0   Creatinine 0.76 - 1.27 mg/dL 0.99   BUN 8 - 23 mg/dL 19   BUN/Creatinine Ratio 7.0 - 25.0  19.2   Calcium 8.6 - 10.5 mg/dL 11.4 (H)   eGFR >60.0 mL/min/1.73 82.5   Hemoglobin A1C 4.80 - 5.60 % 5.70 (H)   C-Reactive Protein 0.00 - 0.50 mg/dL 0.38   WBC 3.40 - 10.80 10*3/mm3 6.96   RBC 4.14 - 5.80 10*6/mm3 5.19   Hemoglobin 13.0 - 17.7 g/dL 15.8   Hematocrit 37.5 - 51.0 % 46.7   RDW 12.3 - 15.4 % 12.4   MCV 79.0 - 97.0 fL 90.0   MCH 26.6 - 33.0 pg 30.4   MCHC 31.5 - 35.7 g/dL 33.8   MPV 6.0 - 12.0 fL 9.7   Platelets 140 - 450 10*3/mm3 245   (H): Data is abnormally high    Assessment and Plan:     Status post total right knee replacement    Primary osteoarthritis of right knee    Hypertension    Elevated hemoglobin A1c      Plan  1. PT/OT- WBAT RLE  2. Pain control-prns, AC nerve block  3. IS-encourage  4. DVT proph- Mechs/ASA  5. Bowel regimen  6. Resume home medications as appropriate  7. Monitor post-op labs  8. DC planning for home, likely tomorrow    HTN  - Continue home lisinopril  - Monitor BP   - Holding parameters for BP meds  - Labetalol PRN for SBP>170    Elevated hemoglobin A1c: In prediabetic range  - Explained to patient implication of A1C elevation, need to modify diet and increase activity, and f/u with PCP.      Tamara Chávez, ELVA  02/22/23  14:23  EST

## 2023-02-22 NOTE — THERAPY EVALUATION
Patient Name: Ashok Chung  : 1955    MRN: [id number]                              Today's Date: 2023       Admit Date: 2023    Visit Dx:     ICD-10-CM ICD-9-CM   1. Postoperative pain  G89.18 338.18   2. Primary osteoarthritis of right knee  M17.11 715.16   3. Status post total left knee replacement  Z96.652 V43.65     Patient Active Problem List   Diagnosis   • OA (osteoarthritis) of knee   • Status post total left knee replacement   • Hypertension   • Postoperative pain   • Primary osteoarthritis of right knee   • Status post total right knee replacement   • Elevated hemoglobin A1c     Past Medical History:   Diagnosis Date   • Arthritis    • Elevated cholesterol    • Hearing loss    • History of COVID-19 2021    blood test showed positive antibodies in 2021 (no symptoms)   • History of staph infection     Lost two vertebrae as a result of staph infection   • Hypercalcemia    • Hypertension    • Staph infection     C2-C3   • Wears glasses      Past Surgical History:   Procedure Laterality Date   • APPENDECTOMY     • COLONOSCOPY     • HERNIA REPAIR Right     INGUINAL   • SPINE SURGERY     • TONSILLECTOMY     • TOTAL KNEE ARTHROPLASTY Left 2021    Procedure: TOTAL KNEE ARTHROPLASTY LEFT;  Surgeon: Connor Anderson MD;  Location: [facility];  Service: Orthopedics;  Laterality: Left;      General Information     Row Name 23 1824          Physical Therapy Time and Intention    Document Type evaluation  -     Mode of Treatment physical therapy  -     Row Name 23 1824          General Information    Patient Profile Reviewed yes  -HP     Prior Level of Function min assist:;all household mobility;community mobility;gait;transfer;bed mobility;ADL's  -HP     Existing Precautions/Restrictions fall;other (see comments)  adductor canal nerve cath  -HP     Barriers to Rehab none identified  -HP     Row Name 23 1824          Living Environment    People in  Home spouse  -     Row Name 02/22/23 1824          Home Main Entrance    Number of Stairs, Main Entrance four  -HP     Row Name 02/22/23 1824          Stairs Within Home, Primary    Number of Stairs, Within Home, Primary none  -     Row Name 02/22/23 1824          Cognition    Orientation Status (Cognition) oriented x 3  -     Row Name 02/22/23 1824          Safety Issues, Functional Mobility    Safety Issues Affecting Function (Mobility) insight into deficits/self-awareness;awareness of need for assistance;safety precaution awareness  -     Impairments Affecting Function (Mobility) balance;endurance/activity tolerance;pain;strength;range of motion (ROM)  -           User Key  (r) = Recorded By, (t) = Taken By, (c) = Cosigned By    Initials Name Provider Type     Michelle Everett, WILLIAM Physical Therapist               Mobility     Row Name 02/22/23 1826          Bed Mobility    Bed Mobility supine-sit  -HP     Supine-Sit [place] (Bed Mobility) modified independence  -     Row Name 02/22/23 1826          Sit-Stand Transfer    Sit-Stand [place] (Transfers) contact guard;2 person assist  -     Assistive Device (Sit-Stand Transfers) walker, front-wheeled  -HP     Comment, (Sit-Stand Transfer) VC for hand placement  -     Row Name 02/22/23 1826          Gait/Stairs (Locomotion)    [place] Level (Gait) contact guard;2 person assist  -     Assistive Device (Gait) walker, front-wheeled  -HP     Ambulated day of surgery or within 4 hours of PACU discharge yes  -HP     Distance in Feet (Gait) 310  -HP     Deviations/Abnormal Patterns (Gait) bilateral deviations;base of support, wide;gait speed decreased;stride length decreased;weight shifting decreased  -     Bilateral Gait Deviations forward flexed posture  -HP     Comment, (Gait/Stairs) Pt amb 310' with FWW and CGAx2. No knee buckling or LOB noted. Pt demonstrated step-through gait pattern and forward flexed posture. VC for increased knee  extension and posture correction. Activity limited by fatigue.  -     Row Name 02/22/23 1826          Mobility    Extremity Weight-bearing Status right lower extremity  -     Right Lower Extremity (Weight-bearing Status) weight-bearing as tolerated (WBAT)  -           User Key  (r) = Recorded By, (t) = Taken By, (c) = Cosigned By    Initials Name Provider Type     Michelle Everett, WILLIAM Physical Therapist               Obj/Interventions     Row Name 02/22/23 1827          Range of Motion Comprehensive    General Range of Motion lower extremity range of motion deficits identified  -     Comment, General Range of Motion R knee ROM 17-70  -[facility] Name 02/22/23 1827          Strength Comprehensive (MMT)    General Manual Muscle Testing (MMT) Assessment lower extremity strength deficits identified  -     Comment, General Manual Muscle Testing (MMT) Assessment Pt able to perform B active ankle DF and SLR  -[facility] Name 02/22/23 1827          Motor Skills    Therapeutic Exercise knee;ankle  -[facility] Name 02/22/23 1827          Knee (Therapeutic Exercise)    Knee (Therapeutic Exercise) strengthening exercise;isometric exercises  -     Knee Isometrics (Therapeutic Exercise) right;quad sets;10 repetitions  -     Knee Strengthening (Therapeutic Exercise) right;SLR (straight leg raise);LAQ (long arc quad);heel slides;3 repetitions  -[facility] Name 02/22/23 1827          Ankle (Therapeutic Exercise)    Ankle (Therapeutic Exercise) AROM (active range of motion)  -     Ankle AROM (Therapeutic Exercise) bilateral;dorsiflexion;plantarflexion;10 repetitions  -[facility] Name 02/22/23 1827          Balance    Balance Assessment sitting static balance;sitting dynamic balance;sit to stand dynamic balance;standing static balance;standing dynamic balance  -     Static Sitting Balance standby assist  -     Dynamic Sitting Balance standby assist  -     Position, Sitting Balance sitting edge of bed  -      Static Standing Balance contact guard  -     Dynamic Standing Balance contact guard  -     Position/Device Used, Standing Balance supported;walker, rolling  -     Balance Interventions sitting;standing;sit to stand;occupation based/functional task  -[facility] Name 02/22/23 1827          Sensory Assessment (Somatosensory)    Sensory Assessment (Somatosensory) LE sensation intact  -           User Key  (r) = Recorded By, (t) = Taken By, (c) = Cosigned By    Initials Name Provider Type     Michelle Everett, PT Physical Therapist               Goals/Plan     [facility] Name 02/22/23 1830          Bed Mobility Goal 1 (PT)    Activity/Assistive Device (Bed Mobility Goal 1, PT) sit to supine/supine to sit  -     [place] Level/Cues Needed (Bed Mobility Goal 1, PT) independent  -HP     Time Frame (Bed Mobility Goal 1, PT) long term goal (LTG);3 days  -HP     Progress/Outcomes (Bed Mobility Goal 1, PT) goal ongoing  -[facility] Name 02/22/23 1830          Transfer Goal 1 (PT)    Activity/Assistive Device (Transfer Goal 1, PT) sit-to-stand/stand-to-sit  -HP     [place] Level/Cues Needed (Transfer Goal 1, PT) modified independence  -HP     Time Frame (Transfer Goal 1, PT) long term goal (LTG);3 days  -HP     Progress/Outcome (Transfer Goal 1, PT) goal ongoing  -[facility] Name 02/22/23 1830          Gait Training Goal 1 (PT)    Activity/Assistive Device (Gait Training Goal 1, PT) gait (walking locomotion)  -     [place] Level (Gait Training Goal 1, PT) modified independence  -HP     Time Frame (Gait Training Goal 1, PT) long term goal (LTG);3 days  -HP     Progress/Outcome (Gait Training Goal 1, PT) goal ongoing  -[facility] Name 02/22/23 1830          ROM Goal 1 (PT)    ROM Goal 1 (PT) R knee ROM 0-90  -HP     Time Frame (ROM Goal 1, PT) long-term goal (LTG);3 days  -HP     Progress/Outcome (ROM Goal 1, PT) goal ongoing  -[facility] Name 02/22/23 1830          Stairs Goal 1 (PT)    Activity/Assistive  Device (Stairs Goal 1, PT) ascending stairs;descending stairs  -HP     [place] Level/Cues Needed (Stairs Goal 1, PT) modified independence  -HP     Number of Stairs (Stairs Goal 1, PT) 4  -HP     Time Frame (Stairs Goal 1, PT) long term goal (LTG);3 days  -HP     Progress/Outcome (Stairs Goal 1, PT) goal ongoing  -HP     Row Name 02/22/23 1830          Therapy Assessment/Plan (PT)    Planned Therapy Interventions (PT) bed mobility training;gait training;home exercise program;balance training;patient/family education;transfer training;stretching;strengthening;stair training;ROM (range of motion)  -HP           User Key  (r) = Recorded By, (t) = Taken By, (c) = Cosigned By    Initials Name Provider Type    HP Michelle Everett, PT Physical Therapist               Clinical Impression     Row Name 02/22/23 1828          Pain    Pretreatment Pain Rating 7/10  -HP     Posttreatment Pain Rating 7/10  -HP     Pain Location - Side/Orientation Right  -HP     Pain Location generalized  -HP     Pain Location - knee  -HP     Pain Intervention(s) Repositioned;Cold applied;Ambulation/increased activity;Elevated  -HP     Row Name 02/22/23 1828          Plan of Care Review    Plan of Care Reviewed With spouse;patient  -HP     Progress no change  -HP     Outcome Evaluation PT eval complete. Pt performed bed mobility with SBA. Pt amb 310' with FWW and CGAx2. No knee buckling or LOB noted. Activity limited by fatigue. HEP and precautions reviewed with pt. Recommend d/c home with assist and OPPT when medically appropriate.  -     Row Name 02/22/23 1828          Therapy Assessment/Plan (PT)    Rehab Potential (PT) good, to achieve stated therapy goals  -HP     Criteria for Skilled Interventions Met (PT) yes;skilled treatment is necessary;meets criteria  -HP     Therapy Frequency (PT) 2 times/day  -HP     Row Name 02/22/23 1828          Vital Signs    Pre Systolic BP Rehab --  VSS  -HP     Pre Patient Position Supine  -HP     Intra  Patient Position Standing  -HP     Post Patient Position Sitting  -HP     Row Name 02/22/23 1828          Positioning and Restraints    Pre-Treatment Position in bed  -HP     Post Treatment Position chair  -HP     In Chair notified nsg;reclined;sitting;call light within reach;encouraged to call for assist;exit alarm on;with family/caregiver;legs elevated;compression device  -HP           User Key  (r) = Recorded By, (t) = Taken By, (c) = Cosigned By    Initials Name Provider Type     Michelle Everett, PT Physical Therapist               Outcome Measures     Row Name 02/22/23 1831 02/22/23 1150       How much help from another person do you currently need...    Turning from your back to your side while in flat bed without using bedrails? 4  -HP 3  -BC    Moving from lying on back to sitting on the side of a flat bed without bedrails? 4  -HP 3  -BC    Moving to and from a bed to a chair (including a wheelchair)? 3  -HP 3  -BC    Standing up from a chair using your arms (e.g., wheelchair, bedside chair)? 3  -HP 2  -BC    Climbing 3-5 steps with a railing? 3  -HP 2  -BC    To walk in hospital room? 3  -HP 2  -BC    AM-PAC 6 Clicks Score (PT) 20  -HP 15  -BC    Highest level of mobility 6 --> Walked 10 steps or more  - 4 --> Transferred to chair/commode  -BC    Row Name 02/22/23 1831          PADD    Diagnosis 1  -HP     Gender 2  -HP     Age Group 1  -HP     Gait Distance 1  -HP     Assist Level 1  -HP     Home Support 3  -HP     PADD Score 9  -HP     Patient Preference home with outpatient rehab  -     Prediction by PADD Score directly home (with home health or out-patient rehab)  -     Row Name 02/22/23 1831          Functional Assessment    Outcome Measure Options AM-PAC 6 Clicks Basic Mobility (PT);PADD  -HP           User Key  (r) = Recorded By, (t) = Taken By, (c) = Cosigned By    Initials Name Provider Type    BC Pretty Flores, RN Registered Nurse     Michelle Everett, PT Physical Therapist                              Physical Therapy Education     Title: PT OT SLP Therapies (In Progress)     Topic: Physical Therapy (Done)     Point: Mobility training (Done)     Learning Progress Summary           Patient Acceptance, E,D, VU,NR by  at 2/22/2023 1831                   Point: Home exercise program (Done)     Learning Progress Summary           Patient Acceptance, E,D, VU,NR by  at 2/22/2023 1831                   Point: Body mechanics (Done)     Learning Progress Summary           Patient Acceptance, E,D, VU,NR by  at 2/22/2023 1831                   Point: Precautions (Done)     Learning Progress Summary           Patient Acceptance, E,D, VU,NR by  at 2/22/2023 1831                               User Key     Initials Effective Dates Name Provider Type Discipline     06/01/21 -  Michelle Everett, PT Physical Therapist PT              PT Recommendation and Plan  Planned Therapy Interventions (PT): bed mobility training, gait training, home exercise program, balance training, patient/family education, transfer training, stretching, strengthening, stair training, ROM (range of motion)  Plan of Care Reviewed With: spouse, patient  Progress: no change  Outcome Evaluation: PT eval complete. Pt performed bed mobility with SBA. Pt amb 310' with FWW and CGAx2. No knee buckling or LOB noted. Activity limited by fatigue. HEP and precautions reviewed with pt. Recommend d/c home with assist and OPPT when medically appropriate.     Time Calculation:    PT Charges     Row Name 02/22/23 1506             Time Calculation    Start Time 1506  -HP      PT Received On 02/22/23  -      PT Goal Re-Cert Due Date 03/04/23  -         Timed Charges    29987 - PT Therapeutic Exercise Minutes 10  -HP         Untimed Charges    PT Eval/Re-eval Minutes 50  -HP         Total Minutes    Timed Charges Total Minutes 10  -HP      Untimed Charges Total Minutes 50  -HP       Total Minutes 60  -HP            User Key  (r) = Recorded By, (t) =  Taken By, (c) = Cosigned By    Initials Name Provider Type    HP Michelle Everett, PT Physical Therapist              Therapy Charges for Today     Code Description Service Date Service Provider Modifiers Qty    05184620536 HC PT THER PROC EA 15 MIN 2/22/2023 Michelle Everett, PT GP 1    43970334578 HC PT EVAL LOW COMPLEXITY 4 2/22/2023 Michelle Everett, PT GP 1    16931828434 HC PT THER SUPP EA 15 MIN 2/22/2023 Michelle Everett, PT GP 3          PT G-Codes  Outcome Measure Options: AM-PAC 6 Clicks Basic Mobility (PT), PADD  AM-PAC 6 Clicks Score (PT): 20  PT Discharge Summary  Anticipated Discharge Disposition (PT): home with assist, home with outpatient therapy services    Michelle Everett, WILLIAM  2/22/2023

## 2023-02-22 NOTE — OP NOTE
Orthopaedics Operative Report    PREOPERATIVE DIAGNOSIS: Primary osteoarthritis right knee    POSTOPERATIVE DIAGNOSIS: Same    PROCEDURE PERFORMED: Right total knee arthroplasty, CPT 59363    SURGEON: Connor Anderson MD    ANESTHESIA:  Spinal with Block    STAFF:  Circulator: Shasta Tarango RN; Lawanda Wisdom RN  Scrub Person: Barry Oscar  Vendor Representative: Thom Rodriguez  Nursing Assistant: Keri Ford PCT; Marion Mckeon PCT  Assistant: Mary Kay Doll PA-C    TOURNIQUET TIME: 79 minutes    ESTIMATED BLOOD LOSS: 50 mL    COMPLICATIONS: None apparent.    RELEASES: None required after bone cuts made and osteophytes removed    APPROACH:  Medial parapatellar    TXA: IV    IMPLANTS:     Implant Name Type Inv. Item Serial No.  Lot No. LRB No. Used Action   DEV CONTRL TISS STRATAFIX SYMM PDS PLUS AIDA CT-1 45CM - [id number] Implant DEV CONTRL TISS STRATAFIX SYMM PDS PLUS AIDA CT-1 45CM  [employer]  Right 1 Implanted   CMT BONE R 1X40 - [id number] Implant CMT BONE R 1X40  [employer] [id number] Right 2 Implanted   PAT KN PERSONA ALLPOLY CMT 8.5X32MM - [id number] Implant PAT KN PERSONA ALLPOLY CMT 8.5X32MM  [employer] [id number] Right 1 Implanted   COMP FEM/KN PERSONA CR CMT COCR STD SZ7 RT - [id number] Implant COMP FEM/KN PERSONA CR CMT COCR STD SZ7 RT  [employer] [id number] Right 1 Implanted   STEM TIB/KN PERSONA CMT 5D SZF RT - [id number] Implant STEM TIB/KN PERSONA CMT 5D SZF RT  [employer] [id number] Right 1 Implanted   ART/SRF KN PERSONA PS EF MC 6TO7 10MM RT - [id number] Implant ART/SRF KN PERSONA PS EF MC 6TO7 10MM RT  [employer] [id number] Right 1 Implanted       Marisela Persona CR femur size 7 standard  size F tibia  32 patella button   Size 10 Vitamin E Medial Congruent highly crosslinked polyethylene articular surface    PREOPERATIVE ANTIBIOTICS: Vancomycin    REFERRING PHYSICIAN: Marina Cobb PA-C    INDICATIONS: Failure of nonoperative treatment  including injections, bracing, and activity modification.    DESCRIPTION OF PROCEDURE: After informed consent was obtained, the patient was taken to the operating room. The patient was given a dose of IV vancomycin prior to incision. After the smooth induction of spinal right anesthesia, the patient’s right lower extremity was prepped and draped in the usual fashion for this type of procedure. We performed a timeout to verify site and the procedure to be performed.  We began with exsanguination of the right lower extremity using an Esmarch bandage and inflation of tourniquet to 300 mmHg. We made our standard midline incision and medial parapatellar approach. We took 20% of the quadriceps tendon medially and a sleeve of tissue around the patella for repair as well a sliver of patellar tendon. Dissected extra-ostially but subperiosteally on the medial side taking off the superficial and deep MCL from the proximal tibia. These were protected throughout the procedure. Visible medial meniscus was excised. The retropatellar fat pad was excised. The ACL and visible lateral meniscus was excised as well as the synovium from the anterior surface of the distal femur.  Osteophytes were removed from the distal femur and proximal tibia at this point.       We then everted the patella and this was resurfaced, restoring patellar height. The patellar height was 23-1/2 mm preoperatively and we cut the patella to 14 mm and sized the patella to a 32.  The lugholes were drilled and the component slightly medialized.      We then turned our attention to preparation of the femur. We placed our intramedullary distal femoral guide into place set on 4 degrees of valgus and due to preoperative flexion contracture, we took an extra 2 mm of bone off of the distal femur. The distal femur was cut protecting medial and lateral structures. We then marked [place]'s line and sized our femur to be a size 7. We marked 5° of external rotation which  correlated well with [place]'s line. We then secured this block into place and made our anterior, posterior, and chamfer cuts. The pins were removed and the bone cuts were completed and freshened up. We then excised our posterior cruciate ligament and exposed the proximal tibia. We took 10 mm of bone off the lateral side. We protected medial and lateral structures during our cut as well as the patellar tendon. We completed the cut. We sized the tibia to be a size F. We then removed meniscus from the back of the knee. We used our flexion extension blocks to make sure our flexion and extension gaps were acceptable. We then completed the preparation of the tibia, aiming the center of the baseplate at the medial third of the tibial tubercle.     We then completed the preparation of our femoral component.  We then trialed and were stable on the medial side at 0°, 30°, 60°, 90° and 120°. The lug holes were then drilled.  We then cemented these components into place. Once the cement had cured, we removed excess cement. We then trialed and a size 10 polyethylene spacer had good stability and full range of motion.  We then deflated the tourniquet prior to placement of our final polyethylene spacer. Any bleeding seen in the back of the knee was controlled. The final spacer was then secured into place. We then used a final irrigation consisting of Irricept and diluted Betadine throughout the knee. We obtained hemostasis. We then closed our deep parapatellar approach using 0 Ethibond in an interrupted fashion. We then injected our periarticular injection into the knee which consisted of 20 ml of NS, 20 ml of 0.5% lidocaine with epi, 20 ml of 0.5% bupivicaine, 30 mg of toradol, and 8 mg of morphine. We then closed our subcutaneous layer using running 2-0 Vicryl and interrupted 2-0 Vicryl. We closed our skin using a running 3-0 Monocryl. We placed an Exofin Fusion dressing system over the incision and took down the drapes. The  patient was transferred back to their hospital bed and then taken to the recovery room in stable condition. All counts were correct postoperatively. I performed the case.    First assistant: Mary Kay Doll PA-C    The skilled assistance of the above noted first assistant was necessary during this complex surgical procedure.  The surgical assistant assisted with every aspect of the operation including, but not limited to, proper and safe positioning of the patient, obtaining adequate surgical exposure, manipulation of surgical instruments to make the proper bone cuts, cementing of the final implants, the continual process of hemostasis during the procedure itself in addition to surgical wound closure and removal of the patient from the operating table and returning the patient back to the [facility].  The assistance of the surgical assistant allowed me to perform the most sensitive and technical potions of this operation using 2 hands, thus enhancing efficiency and patient safety.  This would not be possible without the help of a skilled assistant familiar with the procedure and capable of safely performing the aforementioned tasks.       POSTOPERATIVE PLAN:  1. Weight bearing as tolerated right lower extremity.  2. The patient will receive an indwelling low femoral nerve block in the recovery room.  3. 24-hours of IV Vancomycin.  4.  Full dose aspirin daily beginning tomorrow morning and continuing for 6 weeks for DVT prophylaxis.  5. The patient will be under the medical care of Dr. Moody  6.  [facility] [place] on Friday.    Connor Anderson M.D.*

## 2023-02-22 NOTE — ANESTHESIA PROCEDURE NOTES
Right Adductor Canal Catheter      Patient reassessed immediately prior to procedure    Start time: 2/22/2023 10:24 AM  Reason for block: at surgeon's request and post-op pain management  Performed by  CRNA/CAA: Corie Villanueva CRNA  Assisted by: Magdalena Mohr RNSRNA: Rosa Elena Llamas SRNA  Preanesthetic Checklist  Completed: patient identified, IV checked, site marked, risks and benefits discussed, surgical consent, monitors and equipment checked, pre-op evaluation and timeout performed  Prep:  Pt Position: supine  Sterile barriers:cap, gloves, mask, sterile barriers and washed/disinfected hands  Prep: ChloraPrep  Patient monitoring: blood pressure monitoring, continuous pulse oximetry and EKG  Procedure    Sedation: no  Performed under: spinal  Guidance:ultrasound guided    ULTRASOUND INTERPRETATION.  Using ultrasound guidance a 20 G gauge needle was placed in close proximity to the nerve, at which point, under ultrasound guidance anesthetic was injected in the area of the nerve and spread of the anesthesia was seen on ultrasound in close proximity thereto.  There were no abnormalities seen on ultrasound; a digital image was taken; and the patient tolerated the procedure with no complications. Images:still images obtained, printed/placed on chart    Laterality:right  Block Type:adductor canal block  Injection Technique:catheter  Needle Type:Tuohy and echogenic  Needle Gauge:18 G  Resistance on Injection: none  Catheter Size:20 G (20g)  Cath Depth at skin: 10 cm    Medications Used: bupivacaine PF (MARCAINE) 0.25 % injection - Injection   20 mL - 2/22/2023 10:24:00 AM      Medications  Preservative Free Saline:5ml    Post Assessment  Injection Assessment: negative aspiration for heme, incremental injection and no paresthesia on injection  Patient Tolerance:comfortable throughout block  Complications:no  Additional Notes  CATHETER   A high-frequency linear transducer, with sterile cover, was placed on the  "anterior mid-thigh (between the anterior superior iliac spine and patella). The transducer was then moved medially to identify the Sartorius muscle (Franck), Vastus Medialis muscle (VMM), Superficial Femoral Artery (SFA) and Vein. The transducer was then moved cephalad or caudad to position the SFA in the middle of the Franck. The insertion site was prepped and draped in sterile fashion. Skin and cutaneous tissue was infiltrated with 2-5 ml of 1% Lidocaine. Using ultrasound-guidance, an 18-gauge Lutonixiplex Ultra 360 Touhy needle was advanced in plane from lateral to medial. Preservative-free normal saline was utilized for hydro-dissection of tissue, advancement of Touhy, and to confirm needle placement below the fascial plane of the Franck where the Nerve to the VMM is located. Local anesthetic (LA) 5 ml deposited here. The Touhy needle continues its path lateral to the SFA at the level of the Saphenous Nerve. The remainder of the LA was deposited at the 10-11 o'clock position of the SFA. This injection created a space between the Franck and the SFA. Aspiration every 5 ml to prevent intravascular injection. Injection was completed with negative aspiration of blood and negative intravascular injection. Injection pressures were normal with minimal resistance. A 20-gauge Lutonixiplex Echo catheter was placed through the needle and advance out the tip of the Touhy 3-5 cm anterior to the SFA. The Touhy needle was then removed, and final catheter position verified at the 12 o'clock position to the SFA. The catheter was secured in the usual fashion with skin glue, benzoin, steri-strips, CHG tegaderm and label noting \"Nerve Block Catheter\". Jerk tape applied at yellow connector and catheter connection.           "

## 2023-02-22 NOTE — CASE MANAGEMENT/SOCIAL WORK
Discharge Planning Assessment  [facility]     Patient Name: Ashok Chung  MRN: [id number]  Today's Date: 2/22/2023    Admit Date: 2/22/2023    Plan: Home with [facility] in [place]   Discharge Needs Assessment     Row Name 02/22/23 1225       Living Environment    People in Home spouse    Name(s) of People in Home Oksana Chung (spouse) [phone]    Current Living Arrangements home    Primary Care Provided by self    Provides Primary Care For no one    Family Caregiver if Needed spouse    Able to Return to Prior Arrangements yes       Resource/Environmental Concerns    Resource/Environmental Concerns none    Transportation Concerns none       Transition Planning    Patient/Family Anticipates Transition to home with help/services    Patient/Family Anticipated Services at Transition none    Transportation Anticipated family or friend will provide       Discharge Needs Assessment    Readmission Within the Last 30 Days no previous admission in last 30 days    Equipment Currently Used at Home walker, rolling    Concerns to be Addressed denies needs/concerns at this time    Anticipated Changes Related to Illness none    Equipment Needed After Discharge none               Discharge Plan     Row Name 02/22/23 0095       Plan    Plan Home with [facility] in [place]    Patient/Family in Agreement with Plan yes    Plan Comments Spoke with patient at bedside. Lives with Oksana Chung (spouse) [phone] in [facility]. Is independent with ADL's. No problems with Medicare/[employer] or medications. Has a rolling walker at home. no advanced directives. PCP is [facility]. Plan is home with [facility] in [place]. Order and report were faxed. CM will continue to follow.    Final Discharge Disposition Code 01 - home or self-care              Continued Care and Services - Admitted Since 2/22/2023    Coordination has not been started for this encounter.          Demographic Summary     Row Name  02/22/23 1224       General Information    Admission Type observation    Arrived From PACU/recovery room    Referral Source admission list    Reason for Consult discharge planning    Preferred Language English       Contact Information    Permission Granted to Share Info With     Contact Information Obtained for                Functional Status     Row Name 02/22/23 1224       Functional Status    Usual Activity Tolerance moderate    Current Activity Tolerance moderate       Functional Status, IADL    Medications independent    Meal Preparation independent    Housekeeping independent    Laundry independent    Shopping independent       Mental Status    General Appearance WDL WDL       Mental Status Summary    Recent Changes in Mental Status/Cognitive Functioning no changes       Employment/    Employment Status retired               Psychosocial    No documentation.                Abuse/Neglect    No documentation.                Legal    No documentation.                Substance Abuse    No documentation.                Patient Forms    No documentation.                   Dennis Montalvo RN

## 2023-02-22 NOTE — PLAN OF CARE
Goal Outcome Evaluation:  Plan of Care Reviewed With: spouse, patient        Progress: no change  Outcome Evaluation: PT eval complete. Pt performed bed mobility with SBA. Pt amb 310' with FWW and CGAx2. No knee buckling or LOB noted. Activity limited by fatigue. HEP and precautions reviewed with pt. Recommend d/c home with assist and OPPT when medically appropriate.

## 2023-02-22 NOTE — DISCHARGE INSTRUCTIONS
InfuBLOCK - Patient Information    What is a pain pump?  InfuBLOCK is a postoperative, non-narcotic pain relief system that delivers local anesthetic to or near the surgical site. This is a pain minimizing therapy that delivers an anesthetic (numbing) medicine to the nerve.    The InfuBLOCK pain pump will continuously deliver a local anesthetic medication to block the pain in the area of your procedure.    Where can I find information about my pain pump?           For more information about your pain pump, scan the QR code.  For additional patient resources, visit [employer]/resources-pain-management.                                                                                             The [employer] Nursing Hotline is Here for You 24/7.     Call [phone] for Assistance.  While your physician is your primary source for information about your treatment., there may be times during your treatment that you need assistance with your infusion pump. Our team of compassionate and knowledgeable Registered Nursed (RN) is here to assist every step of the way.    Answers to questions about your infusion pump                 Tubing disconnect  Assistance with pump alarms                                                      Dislodged catheter  Excessive leakage noted from pump                                         Inadequate pain control   SMI COLD THERAPY - PATIENT INSTRUCTION SHEET    Cold Compression Therapy for your comfort and rehabilitation  Your caregivers want you to be productive in your rehab and comfortable during your stay. In keeping with those goals, you will be receiving an SMI Cold Therapy Wrap to help ease post-operative pain and swelling that might keep you from getting back on track! Your SMI Cold Therapy Wrap is effective and simple-to-use, and you will be encouraged to apply it throughout your hospital stay and at home through the duration of your recovery.    When you are ready to go  home  Be sure to take your SMI Cold Therapy Wrap and both sets of Gel Bags with you for continued comfort and use throughout your rehabilitation. If you don't already have them, ask your nurse or aide to retrieve your SMI Gel Bags from the patient freezer.    Home use precautions  Always follow your medical professional's application instructions upon discharge. Your SMI Cold Therapy Wrap and Gel Bags are designed to last for months following your surgery. Never heat the Gel Bags unless specified by your healthcare provider. Supervision is advised when using this product on children or geriatric patients. To avoid danger of suffocation, please keep the outer plastic packaging away from children & pets.    Cold Therapy Instructions  Place Gel Bags in a freezer set ¾ of the way to max temperature for at least (4) hours. For best results, lay the Gel Bags flat and [id number] in the freezer. Once frozen, slide Gel Bags into the gel pouch and secure your wrap to the affected area with the straps.  Gel wraps that have been stored in a freezer for an extended period of time may require a (10) minute period of softening up in a room temperature environment before application.  The gel pouch acts as a protective barrier. NEVER place frozen bags directly onto skin, as this may cause frostbite injury.  The SMI Cold Therapy Wrap is designed to be able to be worm while ambulating. The compression straps can be secured well enough so that the Wrap won't fall off while moving.  Wrap Application Videos can be viewed at [email or web address].  An additional protective barrier such as clothing, a washcloth, hand-towel or pillowcase may be used during prolonged treatment applications.  The Gel-Pouch and Wrap are both Latex-Free and the Gel Bag ingredients are non toxic.    SMI Wrap care instructions  The SMI Cold Therapy Wrap may be hand washed and hung to dry when needed.    [facility] re-order information  Additional SMI body specific  wraps and/or Gel Bags can be re-ordered from [employer] or call 327-ICE-WRAP ([phone])   Nerve Catheter Removal Instructions  When your device is empty:    Remove your catheter by pulling the dressing off slowly (like you would remove a regular bandage). The catheter should pull right out of the skin.  Check that the BLUE tip is intact.                                                                                     If the catheter is stuck, reposition your   extremity and pull slowly until removed.  *If catheter is HURTING and WON'T come out, stop and call [phone] for further assistance.    Remove medication bag from the black carrying case.  Cut the tubing on right and left side of pump, and discard the medication bag and tubing into garbage.  Place the pump and black carrying case into the plastic bag and then place this into the return box.  Seal box with blue stickers and return to [place] postal service.    THIS IS PRE-PAID POSTAGE. “I received and reviewed a copy of the BHLEX Joint Replacement Guide, understand the individualized plan of care and self-management training program developed and do not have any questions.” EXOFIN CARING FOR YOUR WOUND    AFTER exofin Fusion SKIN CLOSURE SYSTEM HAS BEEN APPLIED    YOUR HEALTHCARE PROFESSIONAL HAS CHOSEN TO USE exofin Fusion SKIN CLOSURE SYSTEM TO CLOSE YOUR WOUND.    exocin Fusion is the combination of a mesh and liquid adhesive that allows the incision or wound to be held together during the healing process.    exocin Fusion should remain in place until your healthcare professional; has determined that adequate healing has occurred, which is usually anywhere between 7 to 14 days. In most cases, exofin Fusion is easily removed with little or no discomfort.    In the event that you notice that exofin Fusion is beginning to loosen or may be coming off, contact your healthcare professional.    The following information is provided to help you  understand how to care for your incision and is based on the FDA-cleared product labeling.    You should always follow the instructions of your healthcare provider.    THINGS TO KNOW    BATHING OR SHOWERING    If directed by your healthcare professional, you may occasionally and briefly wet your incision or wound that was treated with exofin Fusion in the shower or bath. Do not soak or scrub your incision or wound. Do not swim or soak your incision or wound in water. After showering or bathing, gently blot your incision or wound dry with a soft towel. If a dry protective dressing is being used over exofin Fusion, it should be replaced with a fresh, dry protective dressing after showering or bathing as directed by your healthcare practitioner. Care should also be taken so that any tape that may be part of the dry protective dressing does not come into contact with exofin Fusion because when the tape is removed, it may also remove exofin Fusion.    WOUND HEALING  If you experience any redness, swelling, discomfort, warmth or pus, contact your healthcare professional and he or she will determine how your incision or wound is healing and take the necessary steps to address any issues.    EXERCISE  Do not engage in strenuous exercise that may cause additional stress on your incision or wound. Follow your healthcare professional's guidance about when you can return to your normal activities.    OINTMENTS OR LIQUIDS  Topical ointments, liquids or any other products (other than dry bandages) should not be applied to the incision while exofin Fusion is in place. These may loosen exofin Fusion from the skin before it has completely healed.    REMOVING exofin Fusion  Your healthcare professional will determine when the healing process of your incision or wound has been completed and exofin Fusion is ready to be removed, which is usually between 7 to 14 days. When healing is complete, your healthcare professional will carefully  peel off the exofin Fusion.    Prior to removal, do not scratch, rub or pick at the mesh. This may loosen the adhesive and mesh before the skin is healed.  In the event that you notice the exofin Fusion is beginning to loosen and may be coming off or comes off with the skin/wound, contract your healthcare professional.    For complete directions on the use of exofin Fusion, please refer to instructions for Use (IFU) included in the product packaging.  IF YOU HAVE ANY QUESTIONS OR CONCERNS ABOUT exofin Fusion PLEASE CONTACT YOUR HEALTHCARE PROFESSIONAL. EXOFIN CARING FOR YOUR WOUND    AFTER exofin Fusion SKIN CLOSURE SYSTEM HAS BEEN APPLIED    YOUR HEALTHCARE PROFESSIONAL HAS CHOSEN TO USE exofin Fusion SKIN CLOSURE SYSTEM TO CLOSE YOUR WOUND.    exocin Fusion is the combination of a mesh and liquid adhesive that allows the incision or wound to be held together during the healing process.    exocin Fusion should remain in place until your healthcare professional; has determined that adequate healing has occurred, which is usually anywhere between 7 to 14 days. In most cases, exofin Fusion is easily removed with little or no discomfort.    In the event that you notice that exofin Fusion is beginning to loosen or may be coming off, contact your healthcare professional.    The following information is provided to help you understand how to care for your incision and is based on the FDA-cleared product labeling.    You should always follow the instructions of your healthcare provider.    THINGS TO KNOW    BATHING OR SHOWERING    If directed by your healthcare professional, you may occasionally and briefly wet your incision or wound that was treated with exofin Fusion in the shower or bath. Do not soak or scrub your incision or wound. Do not swim or soak your incision or wound in water. After showering or bathing, gently blot your incision or wound dry with a soft towel. If a dry protective dressing is being used over  exofin Fusion, it should be replaced with a fresh, dry protective dressing after showering or bathing as directed by your healthcare practitioner. Care should also be taken so that any tape that may be part of the dry protective dressing does not come into contact with exofin Fusion because when the tape is removed, it may also remove exofin Fusion.    WOUND HEALING  If you experience any redness, swelling, discomfort, warmth or pus, contact your healthcare professional and he or she will determine how your incision or wound is healing and take the necessary steps to address any issues.    EXERCISE  Do not engage in strenuous exercise that may cause additional stress on your incision or wound. Follow your healthcare professional's guidance about when you can return to your normal activities.    OINTMENTS OR LIQUIDS  Topical ointments, liquids or any other products (other than dry bandages) should not be applied to the incision while exofin Fusion is in place. These may loosen exofin Fusion from the skin before it has completely healed.    REMOVING exofin Fusion  Your healthcare professional will determine when the healing process of your incision or wound has been completed and exofin Fusion is ready to be removed, which is usually between 7 to 14 days. When healing is complete, your healthcare professional will carefully peel off the exofin Fusion.    Prior to removal, do not scratch, rub or pick at the mesh. This may loosen the adhesive and mesh before the skin is healed.  In the event that you notice the exofin Fusion is beginning to loosen and may be coming off or comes off with the skin/wound, contract your healthcare professional.    For complete directions on the use of exofin Fusion, please refer to instructions for Use (IFU) included in the product packaging.  IF YOU HAVE ANY QUESTIONS OR CONCERNS ABOUT exofin Fusion PLEASE CONTACT YOUR HEALTHCARE PROFESSIONAL. “I received and reviewed a copy of the BHLEX  Joint Replacement Guide, understand the individualized plan of care and self-management training program developed and do not have any questions.” Nerve Catheter Removal Instructions  When your device is empty:    Remove your catheter by pulling the dressing off slowly (like you would remove a regular bandage). The catheter should pull right out of the skin.  Check that the BLUE tip is intact.                                                                                     If the catheter is stuck, reposition your   extremity and pull slowly until removed.  *If catheter is HURTING and WON'T come out, stop and call [phone] for further assistance.    Remove medication bag from the black carrying case.  Cut the tubing on right and left side of pump, and discard the medication bag and tubing into garbage.  Place the pump and black carrying case into the plastic bag and then place this into the return box.  Seal box with blue stickers and return to [place] postal service.    THIS IS PRE-PAID POSTAGE. SMI COLD THERAPY - PATIENT INSTRUCTION SHEET    Cold Compression Therapy for your comfort and rehabilitation  Your caregivers want you to be productive in your rehab and comfortable during your stay. In keeping with those goals, you will be receiving an SMI Cold Therapy Wrap to help ease post-operative pain and swelling that might keep you from getting back on track! Your SMI Cold Therapy Wrap is effective and simple-to-use, and you will be encouraged to apply it throughout your hospital stay and at home through the duration of your recovery.    When you are ready to go home  Be sure to take your SMI Cold Therapy Wrap and both sets of Gel Bags with you for continued comfort and use throughout your rehabilitation. If you don't already have them, ask your nurse or aide to retrieve your SMI Gel Bags from the patient freezer.    Home use precautions  Always follow your medical professional's application instructions upon  discharge. Your SMI Cold Therapy Wrap and Gel Bags are designed to last for months following your surgery. Never heat the Gel Bags unless specified by your healthcare provider. Supervision is advised when using this product on children or geriatric patients. To avoid danger of suffocation, please keep the outer plastic packaging away from children & pets.    Cold Therapy Instructions  Place Gel Bags in a freezer set ¾ of the way to max temperature for at least (4) hours. For best results, lay the Gel Bags flat and [id number] in the freezer. Once frozen, slide Gel Bags into the gel pouch and secure your wrap to the affected area with the straps.  Gel wraps that have been stored in a freezer for an extended period of time may require a (10) minute period of softening up in a room temperature environment before application.  The gel pouch acts as a protective barrier. NEVER place frozen bags directly onto skin, as this may cause frostbite injury.  The [facility] Cold Therapy Wrap is designed to be able to be worm while ambulating. The compression straps can be secured well enough so that the Wrap won't fall off while moving.  Wrap Application Videos can be viewed at [email or web address].  An additional protective barrier such as clothing, a washcloth, hand-towel or pillowcase may be used during prolonged treatment applications.  The Gel-Pouch and Wrap are both Latex-Free and the Gel Bag ingredients are non toxic.    [facility] Wrap care instructions  The [facility] Cold Therapy Wrap may be hand washed and hung to dry when needed.    [facility] re-order information  Additional [facility] body specific wraps and/or Gel Bags can be re-ordered from [email or web address] or call In[employer]ICE-WRAP ([phone])   InfuBLOCK - Patient Information    What is a pain pump?  InfuBLOCK is a postoperative, non-narcotic pain relief system that delivers local anesthetic to or near the surgical site. This is a pain minimizing therapy that delivers an anesthetic  (numbing) medicine to the nerve.    The InfuBLOCK pain pump will continuously deliver a local anesthetic medication to block the pain in the area of your procedure.    Where can I find information about my pain pump?           For more information about your pain pump, scan the QR code.  For additional patient resources, visit [email or web address].                                                                                             The [facility] Nursing Hotline is Here for You 24/7.     Call [phone] for Assistance.  While your physician is your primary source for information about your treatment., there may be times during your treatment that you need assistance with your infusion pump. Our team of compassionate and knowledgeable Registered Nursed (RN) is here to assist every step of the way.    Answers to questions about your infusion pump                 Tubing disconnect  Assistance with pump alarms                                                      Dislodged catheter  Excessive leakage noted from pump                                         Inadequate pain control

## 2023-02-22 NOTE — ANESTHESIA PREPROCEDURE EVALUATION
Anesthesia Evaluation     Patient summary reviewed and Nursing notes reviewed                Airway   Mallampati: I  TM distance: >3 FB  Neck ROM: full  No difficulty expected  Dental - normal exam     Pulmonary - negative pulmonary ROS and normal exam   Cardiovascular - normal exam    (+) hypertension, hyperlipidemia,       Neuro/Psych- negative ROS  GI/Hepatic/Renal/Endo    (+) obesity,       Musculoskeletal     Abdominal  - normal exam    Bowel sounds: normal.   Substance History - negative use     OB/GYN negative ob/gyn ROS         Other   arthritis,        Other Comment: HEARING LOSS                Anesthesia Plan    ASA 2     spinal     (ADDUCTOR CANAL CATHETER FOR POSTOP PAIN)  intravenous induction     Anesthetic plan, risks, benefits, and alternatives have been provided, discussed and informed consent has been obtained with: patient.    Plan discussed with CRNA.        CODE STATUS:

## 2023-02-23 VITALS
TEMPERATURE: 97.8 F | DIASTOLIC BLOOD PRESSURE: 63 MMHG | SYSTOLIC BLOOD PRESSURE: 136 MMHG | OXYGEN SATURATION: 97 % | HEART RATE: 65 BPM | RESPIRATION RATE: 17 BRPM

## 2023-02-23 LAB
ANION GAP SERPL CALCULATED.3IONS-SCNC: 6 MMOL/L (ref 5–15)
BASOPHILS # BLD AUTO: 0.02 10*3/MM3 (ref 0–0.2)
BASOPHILS NFR BLD AUTO: 0.2 % (ref 0–1.5)
BUN SERPL-MCNC: 24 MG/DL (ref 8–23)
BUN/CREAT SERPL: 30.8 (ref 7–25)
CALCIUM SPEC-SCNC: 9.6 MG/DL (ref 8.6–10.5)
CHLORIDE SERPL-SCNC: 107 MMOL/L (ref 98–107)
CO2 SERPL-SCNC: 24 MMOL/L (ref 22–29)
CREAT SERPL-MCNC: 0.78 MG/DL (ref 0.76–1.27)
DEPRECATED RDW RBC AUTO: 42.3 FL (ref 37–54)
EGFRCR SERPLBLD CKD-EPI 2021: 96.5 ML/MIN/1.73
EOSINOPHIL # BLD AUTO: 0 10*3/MM3 (ref 0–0.4)
EOSINOPHIL NFR BLD AUTO: 0 % (ref 0.3–6.2)
ERYTHROCYTE [DISTWIDTH] IN BLOOD BY AUTOMATED COUNT: 12.5 % (ref 12.3–15.4)
GLUCOSE SERPL-MCNC: 116 MG/DL (ref 65–99)
HCT VFR BLD AUTO: 36.5 % (ref 37.5–51)
HGB BLD-MCNC: 12.2 G/DL (ref 13–17.7)
IMM GRANULOCYTES # BLD AUTO: 0.04 10*3/MM3 (ref 0–0.05)
IMM GRANULOCYTES NFR BLD AUTO: 0.3 % (ref 0–0.5)
LYMPHOCYTES # BLD AUTO: 0.71 10*3/MM3 (ref 0.7–3.1)
LYMPHOCYTES NFR BLD AUTO: 5.5 % (ref 19.6–45.3)
MCH RBC QN AUTO: 30.7 PG (ref 26.6–33)
MCHC RBC AUTO-ENTMCNC: 33.4 G/DL (ref 31.5–35.7)
MCV RBC AUTO: 91.9 FL (ref 79–97)
MONOCYTES # BLD AUTO: 1.04 10*3/MM3 (ref 0.1–0.9)
MONOCYTES NFR BLD AUTO: 8.1 % (ref 5–12)
NEUTROPHILS NFR BLD AUTO: 11.03 10*3/MM3 (ref 1.7–7)
NEUTROPHILS NFR BLD AUTO: 85.9 % (ref 42.7–76)
NRBC BLD AUTO-RTO: 0 /100 WBC (ref 0–0.2)
PLATELET # BLD AUTO: 179 10*3/MM3 (ref 140–450)
PMV BLD AUTO: 10.5 FL (ref 6–12)
POTASSIUM SERPL-SCNC: 5 MMOL/L (ref 3.5–5.2)
RBC # BLD AUTO: 3.97 10*6/MM3 (ref 4.14–5.8)
SODIUM SERPL-SCNC: 137 MMOL/L (ref 136–145)
WBC NRBC COR # BLD: 12.84 10*3/MM3 (ref 3.4–10.8)

## 2023-02-23 PROCEDURE — 80048 BASIC METABOLIC PNL TOTAL CA: CPT | Performed by: ORTHOPAEDIC SURGERY

## 2023-02-23 PROCEDURE — A9270 NON-COVERED ITEM OR SERVICE: HCPCS | Performed by: ORTHOPAEDIC SURGERY

## 2023-02-23 PROCEDURE — 63710000001 ASPIRIN EC 325 MG TABLET DELAYED-RELEASE: Performed by: ORTHOPAEDIC SURGERY

## 2023-02-23 PROCEDURE — 85025 COMPLETE CBC W/AUTO DIFF WBC: CPT | Performed by: ORTHOPAEDIC SURGERY

## 2023-02-23 PROCEDURE — 63710000001 FAMOTIDINE 20 MG TABLET: Performed by: NURSE PRACTITIONER

## 2023-02-23 PROCEDURE — 97116 GAIT TRAINING THERAPY: CPT

## 2023-02-23 PROCEDURE — 97535 SELF CARE MNGMENT TRAINING: CPT

## 2023-02-23 PROCEDURE — 63710000001 OXYCODONE 5 MG TABLET: Performed by: ORTHOPAEDIC SURGERY

## 2023-02-23 PROCEDURE — 63710000001 HYDROCODONE-ACETAMINOPHEN 10-325 MG TABLET: Performed by: ORTHOPAEDIC SURGERY

## 2023-02-23 PROCEDURE — A9270 NON-COVERED ITEM OR SERVICE: HCPCS | Performed by: NURSE PRACTITIONER

## 2023-02-23 PROCEDURE — 99024 POSTOP FOLLOW-UP VISIT: CPT | Performed by: ORTHOPAEDIC SURGERY

## 2023-02-23 PROCEDURE — 63710000001 MELOXICAM 15 MG TABLET: Performed by: ORTHOPAEDIC SURGERY

## 2023-02-23 PROCEDURE — 97110 THERAPEUTIC EXERCISES: CPT

## 2023-02-23 PROCEDURE — 97530 THERAPEUTIC ACTIVITIES: CPT

## 2023-02-23 PROCEDURE — 97165 OT EVAL LOW COMPLEX 30 MIN: CPT

## 2023-02-23 RX ADMIN — MELOXICAM 15 MG: 15 TABLET ORAL at 08:41

## 2023-02-23 RX ADMIN — OXYCODONE HYDROCHLORIDE 5 MG: 5 TABLET ORAL at 02:20

## 2023-02-23 RX ADMIN — ASPIRIN 325 MG: 325 TABLET, COATED ORAL at 08:41

## 2023-02-23 RX ADMIN — HYDROCODONE BITARTRATE AND ACETAMINOPHEN 1 TABLET: 10; 325 TABLET ORAL at 12:58

## 2023-02-23 RX ADMIN — FAMOTIDINE 20 MG: 20 TABLET ORAL at 08:41

## 2023-02-23 RX ADMIN — HYDROCODONE BITARTRATE AND ACETAMINOPHEN 1 TABLET: 10; 325 TABLET ORAL at 08:41

## 2023-02-23 NOTE — DISCHARGE SUMMARY
Patient Name: Ashok Chung  MRN: [id number]  : 1955  DOS: 2023    Attending: No att. providers found    Primary Care Provider: Marina Cobb PA-C    Date of Admission:.2023  5:28 AM    Date of Discharge:  2023    Discharge Diagnosis:   Status post total right knee replacement    Hypertension    Primary osteoarthritis of right knee    Elevated hemoglobin A1c      Hospital Course    At admit:    Patient is a pleasant 67 y.o. male presented for scheduled surgery by Dr. Anderson.  He underwent right total knee arthroplasty under spinal anesthesia.  He tolerated surgery well and was admitted for further medical management.  His knee has been painful for many years.  He denies use of assistive device for ambulation or recent falls.     He is known to us from previous admission on 2021 for left knee replacement; which she recovered well.     When seen postop he is doing well.  His pain is well controlled.  He denies nausea, shortness of breath or chest pain.  No history of DVT or PE.        After admit:    Patient was provided pain medications as needed for pain control, along with adductor canal nerve block infusion of Ropivacaine.    Adjustments were made to pain medications to optimize postop pain management.   Risks and benefits of opiate medications discussed with patient.  [place]rence report in chart was reviewed prior to discharge.    He was seen by PT and OT and has progressed well over his stay.    He used an IS for atelectasis prophylaxis and aspirin along with mechanicals for DVT prophylaxis.  Home medications were resumed as appropriate, and labs were monitored and remained fairly stable.     With the progress he has made, he is ready for DC home today.      He will have an adductor canal nerve block (instructed on it during this admit).    Discussed with patient regarding plan and he shows understanding and agreement.    Patient will have HHPT following  discharge.      Procedures Performed    Right total knee arthroplasty    Pertinent Test Results:    I reviewed the patient's new clinical results.   Results from last 7 days   Lab Units 23  0436   WBC 10*3/mm3 12.84*   HEMOGLOBIN g/dL 12.2*   HEMATOCRIT % 36.5*   PLATELETS 10*3/mm3 179     Results from last 7 days   Lab Units 23  0436   SODIUM mmol/L 137   POTASSIUM mmol/L 5.0   CHLORIDE mmol/L 107   CO2 mmol/L 24.0   BUN mg/dL 24*   CREATININE mg/dL 0.78   CALCIUM mg/dL 9.6   GLUCOSE mg/dL 116*     I reviewed the patient's new imaging including images and reports.      Physical therapy: Progress: improving  Outcome Evaluation: Pt. presents below baseline function w/decreased strength, AROM and increased pain affecting his ability to safely participate in functional mobility. He performed transfers w/contact guard assist. He ambulated 50' + 50' w/front wheeled walker, contact guard assist. He navigated 8 steps w/L ascending handrail and R single touch cane. No buckling or loss of balance noted. Pt. tolerated progression in ther-ex well to address strength deficits. Reviewed knee precautions, car transfers. Recommend home with family assist and home health PT upon discharge.      Discharge Assessment:    Vital Signs  Visit Vitals  /63 (BP Location: Left arm, Patient Position: Sitting)   Pulse 65   Temp 97.8 °F (36.6 °C) (Oral)   Resp 17   SpO2 97%     Temp (24hrs), Av.7 °F (36.5 °C), Min:96.6 °F (35.9 °C), Max:98.1 °F (36.7 °C)      General Appearance:    Alert, cooperative, in no acute distress   Lungs:     Clear to auscultation,respirations regular, even and unlabored    Heart:    Regular rhythm and normal rate, normal S1 and S2   Abdomen:     Normal bowel sounds, no masses, no organomegaly, soft non-tender, non-distended, no guarding, no rebound tenderness   Extremities:   CDI dressing surgical knee, right. ACB cath present, arrow pump. Moves all extremities well, no edema, no cyanosis, no  redness   Pulses:   Pulses palpable and equal bilaterally   Skin:   No bleeding, bruising or rash   Neurologic:   Cranial nerves 2 - 12 grossly intact, sensation intact, Flexion and dorsiflexion intact bilateral feet.       Discharge Disposition: Home    Discharge Medications:     Discharge Medications      New Medications      Instructions Start Date   acetaminophen 650 MG 8 hr tablet  Commonly known as: TYLENOL   650 mg, Oral, Every 8 Hours      aspirin  MG tablet   325 mg, Oral, Daily, Begin day after surgery      docusate sodium 100 MG capsule  Commonly known as: Colace   100 mg, Oral, 2 Times Daily PRN      HYDROcodone-acetaminophen  MG per tablet  Commonly known as: Norco   1 tablet, Oral, Every 6 Hours PRN      ibuprofen 600 MG tablet  Commonly known as: ADVIL,MOTRIN   600 mg, Oral, Every 6 Hours PRN      ondansetron 4 MG tablet  Commonly known as: Zofran   4 mg, Oral, Every 8 Hours PRN         Continue These Medications      Instructions Start Date   BENADRYL ALLERGY PO   Oral      cetirizine 10 MG tablet  Commonly known as: zyrTEC   10 mg, Oral, Daily      famotidine 20 MG tablet  Commonly known as: PEPCID   20 mg, Oral, 2 Times Daily      lisinopril 20 MG tablet  Commonly known as: PRINIVIL,ZESTRIL   20 mg, Oral, Nightly      Melatonin 10 MG tablet   Oral      Vitamin D3 50 MCG (2000 UT) tablet   4,000 Units, Oral, Daily             Discharge Diet: Resume home diet      Activity at Discharge: Activity per Dr. Anderson      Follow-up Appointments:  Follow-up with Dr. Kauffman per his orders      Electronically signed by ELVA Gongora, 02/23/23, 2:41 PM EST.    I have personally performed the evaluation on this patient. My history is consistant  with above documentation . My exam finding are listed above. I have personally reviewed and  formulated discharge plan and discussed with  patient and APRN.wy.

## 2023-02-23 NOTE — THERAPY TREATMENT NOTE
Patient Name: Ashok Chung  : 1955    MRN: [id number]                              Today's Date: 2023       Admit Date: 2023    Visit Dx:     ICD-10-CM ICD-9-CM   1. Postoperative pain  G89.18 338.18   2. Primary osteoarthritis of right knee  M17.11 715.16   3. Status post total left knee replacement  Z96.652 V43.65     Patient Active Problem List   Diagnosis   • OA (osteoarthritis) of knee   • Status post total left knee replacement   • Hypertension   • Postoperative pain   • Primary osteoarthritis of right knee   • Status post total right knee replacement   • Elevated hemoglobin A1c     Past Medical History:   Diagnosis Date   • Arthritis    • Elevated cholesterol    • Hearing loss    • History of COVID-19 2021    blood test showed positive antibodies in 2021 (no symptoms)   • History of staph infection     Lost two vertebrae as a result of staph infection   • Hypercalcemia    • Hypertension    • Staph infection     C2-C3   • Wears glasses      Past Surgical History:   Procedure Laterality Date   • APPENDECTOMY     • COLONOSCOPY     • HERNIA REPAIR Right     INGUINAL   • SPINE SURGERY     • TONSILLECTOMY     • TOTAL KNEE ARTHROPLASTY Left 2021    Procedure: TOTAL KNEE ARTHROPLASTY LEFT;  Surgeon: Connor Anderson MD;  Location: [facility];  Service: Orthopedics;  Laterality: Left;      General Information     Row Name 23 0937          Physical Therapy Time and Intention    Document Type therapy note (daily note)  -SS     Mode of Treatment physical therapy  -SS     Row Name 23 0937          General Information    Patient Profile Reviewed yes  -SS     Existing Precautions/Restrictions fall;other (see comments)  adductor canal nerve cath  -SS     Barriers to Rehab none identified  -SS     Row Name 23 0937          Living Environment    People in Home spouse;other (see comments)  2 adult sons will be available to assist as needed  -SS     Row Name  02/23/23 0937          Home Main Entrance    Number of Stairs, Main Entrance three  -SS     Stair Railings, Main Entrance other (see comments)  L concrete slab and stable shelf to provide assist  -     Row Name 02/23/23 0937          Cognition    Orientation Status (Cognition) oriented x 3  -SS     Row Name 02/23/23 0937          Safety Issues, Functional Mobility    Safety Issues Affecting Function (Mobility) insight into deficits/self-awareness;judgment;positioning of assistive device;problem-solving;safety precaution awareness;safety precautions follow-through/compliance;sequencing abilities  -     Impairments Affecting Function (Mobility) balance;endurance/activity tolerance;pain;strength;range of motion (ROM)  -           User Key  (r) = Recorded By, (t) = Taken By, (c) = Cosigned By    Initials Name Provider Type     Silvana Franklin, PT Physical Therapist               Mobility     Row Name 02/23/23 0938          Bed Mobility    Comment, (Bed Mobility) up in chair  -     Row Name 02/23/23 0938          Sit-Stand Transfer    Sit-Stand [place] (Transfers) contact guard;verbal cues  -     Assistive Device (Sit-Stand Transfers) walker, front-wheeled  -     Comment, (Sit-Stand Transfer) VC for hand placement, stepping out RLE  -     Row Name 02/23/23 0938          Gait/Stairs (Locomotion)    [place] Level (Gait) contact guard;verbal cues  -     Assistive Device (Gait) walker, front-wheeled  -     Distance in Feet (Gait) 100  50+50  -     Deviations/Abnormal Patterns (Gait) bilateral deviations;gait speed decreased;stride length decreased;weight shifting decreased;leisa decreased  -     Bilateral Gait Deviations forward flexed posture;heel strike decreased  -     [place] Level (Stairs) contact guard;2 person assist;verbal cues  -     Assistive Device (Stairs) cane, straight  -     Handrail Location (Stairs) left side (ascending);right side (descending)  -     Number of  Steps (Stairs) 8  -SS     Ascending Technique (Stairs) step-to-step  -SS     Descending Technique (Stairs) step-to-step  -SS     Comment, (Gait/Stairs) Pt. ambulated with a step through gait pattern. VC for continuous forward progression of AD, knee flexion, increased heel strike. He performed 8 steps. Initially, pt. demonstrated difficulty w/descending 1 step due to anticipatory pain. Rested, adjusted AD height and pt. performed 7 more w/no difficulty. Pt. verbalized comfort w/performing steps to enter/exit house. Educated pt. sequencing w/LLE leading in ascending, RLE leading in descending, AD management, family assist including use of gait belt, body positioning. No buckling or LOB noted. Gait and stair training limited by pain and fatigue.  -     Row Name 02/23/23 0938          Mobility    Extremity Weight-bearing Status right lower extremity  -     Right Lower Extremity (Weight-bearing Status) weight-bearing as tolerated (WBAT)  -           User Key  (r) = Recorded By, (t) = Taken By, (c) = Cosigned By    Initials Name Provider Type     Silvana Franklin, PT Physical Therapist               Obj/Interventions     Row Name 02/23/23 0942          Range of Motion Comprehensive    Comment, General Range of Motion R knee AROM: -14-60  -     Row Name 02/23/23 0942          Motor Skills    Therapeutic Exercise ankle;knee  -     Row Name 02/23/23 0942          Knee (Therapeutic Exercise)    Knee (Therapeutic Exercise) AROM (active range of motion);isometric exercises  -     Knee AROM (Therapeutic Exercise) right;sitting;flexion;15 repititions  -     Knee Isometrics (Therapeutic Exercise) right;quad sets;10 repetitions;5 repetitions  -     Knee Strengthening (Therapeutic Exercise) right;heel slides;SLR (straight leg raise);SAQ (short arc quad);LAQ (long arc quad);10 repetitions;5 repetitions  -     Row Name 02/23/23 0942          Ankle (Therapeutic Exercise)    Ankle (Therapeutic Exercise) AROM (active  range of motion)  -     Ankle AROM (Therapeutic Exercise) bilateral;dorsiflexion;plantarflexion;15 repititions  -     Row Name 02/23/23 0942          Balance    Balance Assessment sitting static balance;sitting dynamic balance;sit to stand dynamic balance;standing static balance;standing dynamic balance  -     Static Sitting Balance independent  -     Dynamic Sitting Balance standby assist;modified independence  -     Position, Sitting Balance unsupported;sitting in chair  -     Sit to Stand Dynamic Balance contact guard  -     Static Standing Balance contact guard  -     Dynamic Standing Balance contact guard;2-person assist  -     Position/Device Used, Standing Balance supported;walker, front-wheeled  -     Balance Interventions sitting;standing;sit to stand;supported;dynamic;static  -           User Key  (r) = Recorded By, (t) = Taken By, (c) = Cosigned By    Initials Name Provider Type     Silvana Franklin, PT Physical Therapist               Goals/Plan    No documentation.                Clinical Impression     Row Name 02/23/23 0944          Pain    Pretreatment Pain Rating 7/10  -     Posttreatment Pain Rating 7/10  -     Pain Location - Side/Orientation Right  -     Pain Location proximal  -     Pain Location - knee  -     Pain Intervention(s) Cold applied;Repositioned;Ambulation/increased activity;Elevated;Medication (See MAR)  RN administered pain medication  -     Additional Documentation Pain Scale: Numbers Pre/Post-Treatment (Group)  -     Row Name 02/23/23 0944          Plan of Care Review    Plan of Care Reviewed With patient  -     Progress improving  -     Outcome Evaluation Pt. presents below baseline function w/decreased strength, AROM and increased pain affecting his ability to safely participate in functional mobility. He performed transfers w/contact guard assist. He ambulated 50' + 50' w/front wheeled walker, contact guard assist. He navigated 8 steps  w/L ascending handrail and R single touch cane. No buckling or loss of balance noted. Pt. tolerated progression in ther-ex well to address strength deficits. Reviewed knee precautions, car transfers. Recommend home with family assist and home health PT upon discharge.  -     Row Name 02/23/23 0944          Therapy Assessment/Plan (PT)    Rehab Potential (PT) good, to achieve stated therapy goals  -     Criteria for Skilled Interventions Met (PT) yes;skilled treatment is necessary;meets criteria  -     Therapy Frequency (PT) 2 times/day  -     Row Name 02/23/23 0944          Vital Signs    Pre Systolic BP Rehab --  VSS, RN cleared  -     Row Name 02/23/23 0944          Positioning and Restraints    Pre-Treatment Position sitting in chair/recliner  -     Post Treatment Position chair  -SS     In Chair notified nsg;reclined;call light within reach;encouraged to call for assist;exit alarm on;waffle cushion;legs elevated  -           User Key  (r) = Recorded By, (t) = Taken By, (c) = Cosigned By    Initials Name Provider Type     Silvana Franklin, PT Physical Therapist               Outcome Measures     Row Name 02/23/23 0948 02/23/23 0800       How much help from another person do you currently need...    Turning from your back to your side while in flat bed without using bedrails? 3  -SS 4  -BC    Moving from lying on back to sitting on the side of a flat bed without bedrails? 3  -SS 4  -BC    Moving to and from a bed to a chair (including a wheelchair)? 3  -SS 3  -BC    Standing up from a chair using your arms (e.g., wheelchair, bedside chair)? 3  -SS 3  -BC    Climbing 3-5 steps with a railing? 3  -SS 3  -BC    To walk in hospital room? 3  -SS 3  -BC    AM-PAC 6 Clicks Score (PT) 18  -SS 20  -BC    Highest level of mobility 6 --> Walked 10 steps or more  -SS 6 --> Walked 10 steps or more  -BC    Row Name 02/23/23 0949 02/23/23 0948       Functional Assessment    Outcome Measure Options AM-PAC 6 Clicks  Daily Activity (OT)  -TB AM-PAC 6 Clicks Basic Mobility (PT)  -          User Key  (r) = Recorded By, (t) = Taken By, (c) = Cosigned By    Initials Name Provider Type     Sasha Machado, OT Occupational Therapist    Pretty Garland, RN Registered Nurse     Silvana Franklin, PT Physical Therapist                             Physical Therapy Education     Title: PT OT SLP Therapies (In Progress)     Topic: Physical Therapy (Done)     Point: Mobility training (Done)     Learning Progress Summary           Patient Eager, E,H, VU,DU,NR by  at 2/23/2023 0949    Comment: Educated pt. safety/technique with transfers, ambulation, car transfers, stair navigation, knee precautions, PT POC    Acceptance, E,D, VU,NR by  at 2/22/2023 1831                   Point: Home exercise program (Done)     Learning Progress Summary           Patient Eager, E,H, VU,DU,NR by  at 2/23/2023 0949    Comment: Educated pt. safety/technique with transfers, ambulation, car transfers, stair navigation, knee precautions, PT POC    Acceptance, E,D, VU,NR by  at 2/22/2023 1831                   Point: Body mechanics (Done)     Learning Progress Summary           Patient Eager, E,H, VU,DU,NR by  at 2/23/2023 0949    Comment: Educated pt. safety/technique with transfers, ambulation, car transfers, stair navigation, knee precautions, PT POC    Acceptance, E,D, VU,NR by  at 2/22/2023 1831                   Point: Precautions (Done)     Learning Progress Summary           Patient Eager, E,H, VU,DU,NR by  at 2/23/2023 0949    Comment: Educated pt. safety/technique with transfers, ambulation, car transfers, stair navigation, knee precautions, PT POC    Acceptance, E,D, VU,NR by  at 2/22/2023 1831                               User Key     Initials Effective Dates Name Provider Type Discipline     06/01/21 -  Michelle Everett, PT Physical Therapist PT     06/01/21 -  Silvana Franklin, PT Physical Therapist PT              PT  Recommendation and Plan     Plan of Care Reviewed With: patient  Progress: improving  Outcome Evaluation: Pt. presents below baseline function w/decreased strength, AROM and increased pain affecting his ability to safely participate in functional mobility. He performed transfers w/contact guard assist. He ambulated 50' + 50' w/front wheeled walker, contact guard assist. He navigated 8 steps w/L ascending handrail and R single touch cane. No buckling or loss of balance noted. Pt. tolerated progression in ther-ex well to address strength deficits. Reviewed knee precautions, car transfers. Recommend home with family assist and home health PT upon discharge.     Time Calculation:    PT Charges     Row Name 02/23/23 0950             Time Calculation    Start Time 0835  -SS      Stop Time 0920  -SS      Time Calculation (min) 45 min  -SS      PT Received On 02/23/23  -SS         Time Calculation- PT    Total Timed Code Minutes- PT 45 minute(s)  -SS         Timed Charges    76496 - PT Therapeutic Exercise Minutes 15  -SS      84465 - Gait Training Minutes  20  -SS      30238 - PT Therapeutic Activity Minutes 10  -SS         Total Minutes    Timed Charges Total Minutes 45  -SS       Total Minutes 45  -SS            User Key  (r) = Recorded By, (t) = Taken By, (c) = Cosigned By    Initials Name Provider Type    SS Silvana Franklin, PT Physical Therapist              Therapy Charges for Today     Code Description Service Date Service Provider Modifiers Qty    02063599800 HC PT THER PROC EA 15 MIN 2/23/2023 Silvana Franklin, PT GP 1    97601566583 HC GAIT TRAINING EA 15 MIN 2/23/2023 Silvana Franklin, PT GP 1    75244773972 HC PT THERAPEUTIC ACT EA 15 MIN 2/23/2023 Silvana Franklin, PT GP 1    85165316257 HC PT THER SUPP EA 15 MIN 2/23/2023 Silvana Franklin, PT GP 2          PT G-Codes  Outcome Measure Options: AM-PAC 6 Clicks Daily Activity (OT)  AM-PAC 6 Clicks Score (PT): 18  AM-PAC 6 Clicks Score (OT): 19  PT Discharge  Summary  Anticipated Discharge Disposition (PT): home with assist, home with home health    Silvana Franklin, PT  2/23/2023

## 2023-02-23 NOTE — CASE MANAGEMENT/SOCIAL WORK
Case Management Discharge Note      Final Note: Home with OP PT at [facility] in [place]. I called them at [phone] and patient has an appt on 2-24 at 9am that patient made before his surgery, so, patient is aware. I faxed PT notes and other clinical information to [phone]. No other d/c needs noted.         Selected Continued Care - Admitted Since 2/22/2023     Destination    No services have been selected for the patient.              Durable Medical Equipment    No services have been selected for the patient.              Dialysis/Infusion    No services have been selected for the patient.              Home Medical Care    No services have been selected for the patient.              Therapy    No services have been selected for the patient.              Community Resources    No services have been selected for the patient.              Community & DME    No services have been selected for the patient.                       Final Discharge Disposition Code: 01 - home or self-care

## 2023-02-23 NOTE — PROGRESS NOTES
[facility]    Acute pain service Inpatient Progress Note    Patient Name: Ashok Chung  :  1955  MRN:  [id number]        Acute Pain  Service Inpatient Progress Note:    Analgesia:Fair  Pain Score:5/10  LOC: alert and awake  Resp Status: room air  Cardiac: VS stable  Side Effects:None  Catheter Site:clean, dressing intact and dry  Cath type: peripheral nerve cath with ON Q  Volume: 1mL,8ml, 8ml InfuSystem Pump.  Catheter Plan:Catheter to remain Insitu and Continue catheter infusion rate unchanged  Comments: Pt states that he's just from PT and rates pain @ 5. States that pain is better at rest.  Pt able to dorsiflex as well as plantarflex. Pt also able to lift heel of the surgical side off the bed.

## 2023-02-23 NOTE — PLAN OF CARE
Goal Outcome Evaluation:  Plan of Care Reviewed With: patient           Outcome Evaluation: [employer] video watched. D/C home

## 2023-02-23 NOTE — PROGRESS NOTES
Orthopaedic Surgery Progress Note      LOS: 0 days   Patient Care Team:  Marina Cobb PA-C as PCP - General (Physician Assistant)  Margo Garg MD as Consulting Physician (Endocrinology)    POD 1    Subjective     Interval History:   Patient doing well this morning.  Having more thigh pain than anything else.  Denies chest pain or shortness of breath.  Has been able to get up and ambulate with PT.    Objective     Vital Signs:  Temp (24hrs), Av.6 °F (36.4 °C), Min:96.6 °F (35.9 °C), Max:98.1 °F (36.7 °C)    /59 (BP Location: Left arm, Patient Position: Lying)   Pulse 72   Temp 97.8 °F (36.6 °C) (Oral)   Resp 18   SpO2 99%     Labs:  Lab Results (last 24 hours)     Procedure Component Value Units Date/Time    Basic Metabolic Panel [[id number]]  (Abnormal) Collected: 23    Specimen: Blood Updated: 23     Glucose 116 mg/dL      BUN 24 mg/dL      Creatinine 0.78 mg/dL      Sodium 137 mmol/L      Potassium 5.0 mmol/L      Chloride 107 mmol/L      CO2 24.0 mmol/L      Calcium 9.6 mg/dL      BUN/Creatinine Ratio 30.8     Anion Gap 6.0 mmol/L      eGFR 96.5 mL/min/1.73     Narrative:      GFR Normal >60  Chronic Kidney Disease <60  Kidney Failure <15      CBC & Differential [[id number]]  (Abnormal) Collected: 23    Specimen: Blood Updated: 23    Narrative:      The following orders were created for panel order CBC & Differential.  Procedure                               Abnormality         Status                     ---------                               -----------         ------                     CBC Auto Differential[[id number]]        Abnormal            Final result                 Please view results for these tests on the individual orders.    CBC Auto Differential [[id number]]  (Abnormal) Collected: 23    Specimen: Blood Updated: 23     WBC 12.84 10*3/mm3      RBC 3.97 10*6/mm3      Hemoglobin 12.2 g/dL      Hematocrit  36.5 %      MCV 91.9 fL      MCH 30.7 pg      MCHC 33.4 g/dL      RDW 12.5 %      RDW-SD 42.3 fl      MPV 10.5 fL      Platelets 179 10*3/mm3      Neutrophil % 85.9 %      Lymphocyte % 5.5 %      Monocyte % 8.1 %      Eosinophil % 0.0 %      Basophil % 0.2 %      Immature Grans % 0.3 %      Neutrophils, Absolute 11.03 10*3/mm3      Lymphocytes, Absolute 0.71 10*3/mm3      Monocytes, Absolute 1.04 10*3/mm3      Eosinophils, Absolute 0.00 10*3/mm3      Basophils, Absolute 0.02 10*3/mm3      Immature Grans, Absolute 0.04 10*3/mm3      nRBC 0.0 /100 WBC           Physical Exam:  EHL, FHL, gastroc soleus, and tibialis anterior are intact  Toes are pink and warm  Surgical dressing is in place  Palpable dorsalis pedis pulse  Calf is soft and nontender    Postoperative x-ray has been reviewed and looks acceptable    Assessment & Plan     Postoperative day number 1 status post right total knee arthroplasty.    Labs: Hematocrit 37, creatinine 0.78, platelets 179,000    Pain Control: Good overall    PT and OT     DVT prophylaxis: Full dose aspirin daily beginning today and continuing for 6 weeks    Discharge planning: Anticipate discharge home this morning.  Prescriptions for hydrocodone 10 mg, aspirin, Zofran, Colace, Tylenol, ibuprofen and physical therapy have been written    Upon discharge, patient will need follow-up with me in 3 weeks' time.  They will need orthopedic and sports PT in [place] for physical therapy beginning tomorrow.  Standard Exofin Fusion dressing care instructions.  Do not remove.  DME per case management.    Admission Status:  I believe this patient meets INPATIENT status due to the need for care which can only be reasonably provided in a hospital setting such as aggressive/expedited ancillary services and/or consultation services, the necessity for IV medications, close physician monitoring and/or the possible need for procedures.  In such, I feel patient’s risk for adverse outcomes and need for  care warrant INPATIENT evaluation and predict the patient’s care encounter to likely last beyond 2 midnights.        Connor Anderson MD  02/23/23  08:02 EST     Oriented - self; Oriented - place; Oriented - time

## 2023-02-23 NOTE — THERAPY DISCHARGE NOTE
Acute Care - Occupational Therapy Discharge  [facility]    Patient Name: Ashok Chung  : 1955    MRN: [id number]                              Today's Date: 2023       Admit Date: 2023    Visit Dx:     ICD-10-CM ICD-9-CM   1. Postoperative pain  G89.18 338.18   2. Primary osteoarthritis of right knee  M17.11 715.16   3. Status post total left knee replacement  Z96.652 V43.65     Patient Active Problem List   Diagnosis   • OA (osteoarthritis) of knee   • Status post total left knee replacement   • Hypertension   • Postoperative pain   • Primary osteoarthritis of right knee   • Status post total right knee replacement   • Elevated hemoglobin A1c     Past Medical History:   Diagnosis Date   • Arthritis    • Elevated cholesterol    • Hearing loss    • History of COVID-19 2021    blood test showed positive antibodies in 2021 (no symptoms)   • History of staph infection     Lost two vertebrae as a result of staph infection   • Hypercalcemia    • Hypertension    • Staph infection     C2-C3   • Wears glasses      Past Surgical History:   Procedure Laterality Date   • APPENDECTOMY     • COLONOSCOPY     • HERNIA REPAIR Right     INGUINAL   • SPINE SURGERY     • TONSILLECTOMY     • TOTAL KNEE ARTHROPLASTY Left 2021    Procedure: TOTAL KNEE ARTHROPLASTY LEFT;  Surgeon: Connor Anderson MD;  Location: [facility];  Service: Orthopedics;  Laterality: Left;      General Information     Row Name 23 0938          OT Time and Intention    Document Type evaluation;discharge treatment  -TB     Mode of Treatment occupational therapy;individual therapy  -TB     Row Name 23 0938          General Information    Patient Profile Reviewed yes  -TB     Prior Level of Function independent:;all household mobility;transfer;bed mobility;min assist:;ADL's  Limited by pain all activities  -TB     Existing Precautions/Restrictions fall;other (see comments)  s/p R TKA, adductor canal block   -TB     Barriers to Rehab previous functional deficit  -TB     Row Name 02/23/23 0938          Occupational Profile    Reason for Services/Referral (Occupational Profile) Occupational decline  -TB     Environmental Supports and Barriers (Occupational Profile) Pt lives with his spouse in a multilevel home. 4 steps to enter at garage. Walk-in shower. Commode riser. RW available. Good family support at d/c.  -TB     Row Name 02/23/23 0938          Living Environment    People in Home spouse;other (see comments)  2 adult sons available to assist as needed.  -TB     Row Name 02/23/23 0938          Home Main Entrance    Number of Stairs, Main Entrance four  -TB     Row Name 02/23/23 0938          Stairs Within Home, Primary    Number of Stairs, Within Home, Primary none  -TB     Row Name 02/23/23 0938          Cognition    Orientation Status (Cognition) oriented x 4  -TB     Row Name 02/23/23 0938          Safety Issues, Functional Mobility    Safety Issues Affecting Function (Mobility) insight into deficits/self-awareness;awareness of need for assistance;safety precaution awareness;safety precautions follow-through/compliance;sequencing abilities;judgment  -TB     Impairments Affecting Function (Mobility) balance;endurance/activity tolerance;pain;strength;range of motion (ROM)  -TB     Comment, Safety Issues/Impairments (Mobility) Pt up with RW support and assist x1  -TB           User Key  (r) = Recorded By, (t) = Taken By, (c) = Cosigned By    Initials Name Provider Type    TB Sasha Machado OT Occupational Therapist               Mobility/ADL's     Row Name 02/23/23 0941          Bed Mobility    Bed Mobility supine-sit;scooting/bridging  -TB     Scooting/Bridging [place] (Bed Mobility) standby assist;verbal cues  -TB     Supine-Sit [place] (Bed Mobility) standby assist;verbal cues  -TB     Bed Mobility, Safety Issues decreased use of legs for bridging/pushing  -TB     Assistive Device (Bed Mobility)  head of bed elevated;bed rails  -TB     Comment, (Bed Mobility) Transitions to EOB sitting with time  -TB     Row Name 02/23/23 0941          Transfers    Transfers sit-stand transfer;bed-chair transfer  -TB     Comment, (Transfers) Education and cues for hand placement, sequencing, and safety all transfers.  -TB     Row Name 02/23/23 0941          Bed-Chair Transfer    Bed-Chair [place] (Transfers) contact guard;1 person assist;verbal cues  -TB     Assistive Device (Bed-Chair Transfers) walker, front-wheeled  -TB     Row Name 02/23/23 0941          Sit-Stand Transfer    Sit-Stand [place] (Transfers) contact guard;1 person assist;verbal cues  -TB     Assistive Device (Sit-Stand Transfers) walker, front-wheeled  -TB     Row Name 02/23/23 0941          Functional Mobility    Functional Mobility- Ind. Level contact guard assist;1 person;verbal cues required  -TB     Functional Mobility- Device walker, front-wheeled  -TB     Functional Mobility-Distance (Feet) 100  -TB     Functional Mobility- Safety Issues step length decreased;balance decreased during turns  -TB     Functional Mobility- Comment Good effort. No dizziness or LOB. No knee buckling.  -TB     Row Name 02/23/23 0941          Activities of Daily Living    BADL Assessment/Intervention bathing;upper body dressing;lower body dressing;grooming;feeding;toileting  -     Row Name 02/23/23 0941          Mobility    Extremity Weight-bearing Status right lower extremity  -TB     Right Lower Extremity (Weight-bearing Status) weight-bearing as tolerated (WBAT)  -     Row Name 02/23/23 0941          Bathing Assessment/Intervention    [place] Level (Bathing) set up;distal lower extremities/feet  -TB     Assistive Devices (Bathing) long-handled sponge  -TB     Position (Bathing) supported sitting  -TB     Comment, (Bathing) Education/demonstration completed for precautions and ADL retraining. Teaching reviewed for nerve block precautions (no showers  until block removed)  -TB     Row Name 02/23/23 0941          Upper Body Dressing Assessment/Training    [place] Level (Upper Body Dressing) don;pajama/robe;set up  -TB     Position (Upper Body Dressing) edge of bed sitting  -TB     Row Name 02/23/23 0941          Lower Body Dressing Assessment/Training    [place] Level (Lower Body Dressing) lower body dressing skills  -TB     Assistive Devices (Lower Body Dressing) reacher;sock-aid  -TB     Position (Lower Body Dressing) supported sitting  -TB     Comment, (Lower Body Dressing) Education/demonstration completed for precautions and ADL retraining. Pt has LH shoe horn at home.  -TB     Row Name 02/23/23 0941          Grooming Assessment/Training    [place] Level (Grooming) set up;standby assist;wash face, hands;oral care regimen  -TB     Position (Grooming) sink side  -TB     Row Name 02/23/23 0941          Self-Feeding Assessment/Training    [place] Level (Feeding) independent;scoop food and bring to mouth;liquids to mouth  -TB     Position (Self-Feeding) sitting up in bed  -TB     Row Name 02/23/23 0941          Toileting Assessment/Training    [place] Level (Toileting) independent  -TB     Assistive Devices (Toileting) urinal  -TB     Position (Toileting) supported standing  -TB     Comment, (Toileting) Pt with post op urinary retention able to void this morning. RN notified.  -TB           User Key  (r) = Recorded By, (t) = Taken By, (c) = Cosigned By    Initials Name Provider Type    TB Sahsa Machado OT Occupational Therapist               Obj/Interventions     Row Name 02/23/23 0946          Sensory Assessment (Somatosensory)    Sensory Assessment (Somatosensory) UE sensation intact  -TB     Row Name 02/23/23 0946          Vision Assessment/Intervention    Visual Impairment/Limitations corrective lenses full-time  -TB     Row Name 02/23/23 0946          Range of Motion Comprehensive    General Range of Motion bilateral upper  extremity ROM WFL  -TB     Comment, General Range of Motion BUE AROM intact for self-care  -TB     Row Name 02/23/23 0946          Strength Comprehensive (MMT)    Comment, General Manual Muscle Testing (MMT) Assessment Generalized weakness, no focal deficits  -TB     Row Name 02/23/23 0946          Balance    Balance Assessment sitting dynamic balance;sit to stand dynamic balance;standing dynamic balance  -TB     Dynamic Sitting Balance supervision  -TB     Position, Sitting Balance unsupported;sitting in chair;sitting edge of bed  -TB     Sit to Stand Dynamic Balance contact guard;1-person assist;verbal cues  -TB     Dynamic Standing Balance contact guard;1-person assist;verbal cues  -TB     Position/Device Used, Standing Balance supported;walker, front-wheeled  -TB     Balance Interventions sitting;standing;sit to stand;supported;dynamic;dynamic reaching;occupation based/functional task;UE activity with balance activity  -TB           User Key  (r) = Recorded By, (t) = Taken By, (c) = Cosigned By    Initials Name Provider Type    TB Sasha Machado OT Occupational Therapist               Goals/Plan    No documentation.                Clinical Impression     Row Name 02/23/23 0947          Pain Assessment    Pain Intervention(s) Ambulation/increased activity;Repositioned;Other (Comment)  Nerve block infusing  -TB     Additional Documentation Pain Scale: FACES Pre/Post-Treatment (Group)  -TB     [facility] Name 02/23/23 0947          Pain Scale: FACES Pre/Post-Treatment    Pain: FACES Scale, Pretreatment 2-->hurts little bit  -TB     Posttreatment Pain Rating 4-->hurts little more  -TB     Pain Location - Side/Orientation Right  -TB     Pain Location posterior  -TB     Pain Location - knee  -TB     Pre/Posttreatment Pain Comment Pt tolerates dynamic OOB activity well  -TB     Row Name 02/23/23 0947          Plan of Care Review    Plan of Care Reviewed With patient  -TB     Progress improving  -TB     Outcome  Evaluation Pt presents with strength, balance, and ROM deficits limiting mobility and self-care performance. Up in room/bathroom with RW support and CGAx1. Good effort and no knee buckling this session. Education/demonstration completed for precautions and ADL retraining. OT will d/c at this time. Anticipate pt's d/c to home today following PT. No DME needs.  -TB     Row Name 02/23/23 0947          Therapy Assessment/Plan (OT)    Therapy Frequency (OT) evaluation only  -TB     Row Name 02/23/23 0947          Therapy Plan Review/Discharge Plan (OT)    Anticipated Discharge Disposition (OT) home with assist  -TB     Row Name 02/23/23 0947          Vital Signs    O2 Delivery Pre Treatment room air  -TB     Pre Patient Position Supine  -TB     Intra Patient Position Standing  -TB     Post Patient Position Sitting  -TB     Row Name 02/23/23 0947          Positioning and Restraints    Pre-Treatment Position in bed  -TB     Post Treatment Position chair  -TB     In Chair reclined;call light within reach;encouraged to call for assist;with PT;with nsg  -TB           User Key  (r) = Recorded By, (t) = Taken By, (c) = Cosigned By    Initials Name Provider Type    TB Sasha Machado, OT Occupational Therapist               Outcome Measures     Row Name 02/23/23 0949          How much help from another is currently needed...    Putting on and taking off regular lower body clothing? 2  -TB     Bathing (including washing, rinsing, and drying) 3  -TB     Toileting (which includes using toilet bed pan or urinal) 3  -TB     Putting on and taking off regular upper body clothing 4  -TB     Taking care of personal grooming (such as brushing teeth) 3  -TB     Eating meals 4  -TB     AM-PAC 6 Clicks Score (OT) 19  -TB     Row Name 02/23/23 0948 02/23/23 0800       How much help from another person do you currently need...    Turning from your back to your side while in flat bed without using bedrails? 3  -SS 4  -BC    Moving from  lying on back to sitting on the side of a flat bed without bedrails? 3  -SS 4  -BC    Moving to and from a bed to a chair (including a wheelchair)? 3  -SS 3  -BC    Standing up from a chair using your arms (e.g., wheelchair, bedside chair)? 3  -SS 3  -BC    Climbing 3-5 steps with a railing? 3  -SS 3  -BC    To walk in hospital room? 3  -SS 3  -BC    AM-PAC 6 Clicks Score (PT) 18  -SS 20  -BC    Highest level of mobility 6 --> Walked 10 steps or more  -SS 6 --> Walked 10 steps or more  -BC    Row Name 02/23/23 0949 02/23/23 0948       Functional Assessment    Outcome Measure Options AM-PAC 6 Clicks Daily Activity (OT)  -TB AM-PAC 6 Clicks Basic Mobility (PT)  -SS          User Key  (r) = Recorded By, (t) = Taken By, (c) = Cosigned By    Initials Name Provider Type    TB Sasha Machado, OT Occupational Therapist    BC Pretty Flores, RN Registered Nurse    SS Silvana Franklin, PT Physical Therapist              Occupational Therapy Education     Title: PT OT SLP Therapies (In Progress)     Topic: Occupational Therapy (In Progress)     Point: ADL training (Done)     Description:   Instruct learner(s) on proper safety adaptation and remediation techniques during self care or transfers.   Instruct in proper use of assistive devices.              Learning Progress Summary           Patient Acceptance, E,D, VU,NR by  at 2/23/2023 0950                   Point: Home exercise program (Not Started)     Description:   Instruct learner(s) on appropriate technique for monitoring, assisting and/or progressing therapeutic exercises/activities.              Learner Progress:  Not documented in this visit.          Point: Precautions (Done)     Description:   Instruct learner(s) on prescribed precautions during self-care and functional transfers.              Learning Progress Summary           Patient Acceptance, E,D, VU,NR by  at 2/23/2023 0950                   Point: Body mechanics (Not Started)     Description:    Instruct learner(s) on proper positioning and spine alignment during self-care, functional mobility activities and/or exercises.              Learner Progress:  Not documented in this visit.                      User Key     Initials Effective Dates Name Provider Type [facility] 02/03/23 -  Sasha Machado OT Occupational Therapist OT              OT Recommendation and Plan  Therapy Frequency (OT): evaluation only  Plan of Care Review  Plan of Care Reviewed With: patient  Progress: improving  Outcome Evaluation: Pt presents with strength, balance, and ROM deficits limiting mobility and self-care performance. Up in room/bathroom with RW support and CGAx1. Good effort and no knee buckling this session. Education/demonstration completed for precautions and ADL retraining. OT will d/c at this time. Anticipate pt's d/c to home today following PT. No DME needs.  Plan of Care Reviewed With: patient  Outcome Evaluation: Pt presents with strength, balance, and ROM deficits limiting mobility and self-care performance. Up in room/bathroom with RW support and CGAx1. Good effort and no knee buckling this session. Education/demonstration completed for precautions and ADL retraining. OT will d/c at this time. Anticipate pt's d/c to home today following PT. No DME needs.     Time Calculation:    Time Calculation- OT     Row Name 02/23/23 0950 02/23/23 0805          Time Calculation- OT    OT Start Time -- 0805  -TB     OT Received On -- 02/23/23  -TB        Timed Charges    02805 - Gait Training Minutes  20  -SS --     57241 - OT Self Care/Mgmt Minutes -- 25  -TB        Untimed Charges    OT Eval/Re-eval Minutes -- 45  -TB        Total Minutes    Timed Charges Total Minutes 20  -SS 25  -TB     Untimed Charges Total Minutes -- 45  -TB      Total Minutes 20  -SS 70  -TB           User Key  (r) = Recorded By, (t) = Taken By, (c) = Cosigned By    Initials Name Provider Type    TB Sasha Machado OT Occupational  Therapist    SS Silvana Franklin PT Physical Therapist              Therapy Charges for Today     Code Description Service Date Service Provider Modifiers Qty    [id number] HC OT EVAL LOW COMPLEXITY 3 2/23/2023 Sasha Machado OT GO 1    [id number]  OT SELF CARE/MGMT/TRAIN EA 15 MIN 2/23/2023 Sasha Machado OT GO 2             OT Discharge Summary  Anticipated Discharge Disposition (OT): home with assist    Sasha Machado OT  2/23/2023

## 2023-02-23 NOTE — DISCHARGE PLACEMENT REQUEST
"Ashok Muniz (67 y.o. Male) home today, PT notes and other clinical notes.     Date of Birth   1955    Social Security Number       Address   [address] [place] [address]    Home Phone   [phone]    MRN   [id number]       [facility]    Marital Status                               Admission Date   2/22/23    Admission Type   Elective    Admitting Provider   Connor Anderson MD    Attending Provider   Connor Anderson MD    Department, Room/Bed   [facility] 3G, S358/1       Discharge Date       Discharge Disposition   Home or Self Care    Discharge Destination                               Attending Provider: Connor Anderson MD    Allergies: Penicillins, Sulfa Antibiotics    Isolation: None   Infection: None   Code Status: Not on file    Ht: 170.2 cm (67.01\")   Wt: 99.3 kg (219 lb)    Admission Cmt: None   Principal Problem: Status post total right knee replacement [Z96.651]                 Active Insurance as of 2/22/2023     Primary Coverage     Payor Plan Insurance Group Employer/Plan Group    MEDICARE MEDICARE A & B      Payor Plan Address Payor Plan Phone Number Payor Plan Fax Number Effective Dates    [address] [phone]  5/1/2018 - None Entered    [facility] [id number]       Subscriber Name Subscriber Birth Date Member ID       ASHOK MUNIZ 1955 [id number]           Secondary Coverage     Payor Plan Insurance Group Employer/Plan Group    [id number]     Payor Plan Address Payor Plan Phone Number Payor Plan Fax Number Effective Dates    [address]   1/1/2017 - None Entered    [facility] [id number]       Subscriber Name Subscriber Birth Date Member ID       ASHOK MUNIZ 1955 [id number]                 Emergency Contacts      (Rel.) Home Phone Work Phone Mobile Phone    CRISTYDONNA (Spouse) -- -- [phone]               History & Physical      Tamara Chávez APRN at " 23 1242          Patient Name: Ashok Chung  MRN: [id number]  : 1955  DOS: 2023    Attending: Connor Anderson,*    Primary Care Provider: Marina Cobb PA-C      Chief complaint: Right knee pain    Subjective    Patient is a pleasant 67 y.o. male presented for scheduled surgery by Dr. Anderson.  He underwent right total knee arthroplasty under spinal anesthesia.  He tolerated surgery well and was admitted for further medical management.  His knee has been painful for many years.  He denies use of assistive device for ambulation or recent falls.    He is known to us from previous admission on 2021 for left knee replacement; which she recovered well.    When seen postop he is doing well.  His pain is well controlled.  He denies nausea, shortness of breath or chest pain.  No history of DVT or PE.    Allergies:  Allergies   Allergen Reactions   • Penicillins Anaphylaxis and Hives   • Sulfa Antibiotics Hives       Meds:  Medications Prior to Admission   Medication Sig Dispense Refill Last Dose   • cetirizine (zyrTEC) 10 MG tablet Take 10 mg by mouth Daily.   2023 at 2200   • Cholecalciferol (Vitamin D3) 50 MCG (2000) tablet Take 4,000 Units by mouth Daily.   2023   • diphenhydrAMINE HCl (BENADRYL ALLERGY PO) Take  by mouth.   Past Week   • famotidine (PEPCID) 20 MG tablet Take 20 mg by mouth 2 (Two) Times a Day.   Past Week   • lisinopril (PRINIVIL,ZESTRIL) 20 MG tablet Take 20 mg by mouth Every Night.   2023   • Melatonin 10 MG tablet Take  by mouth.   2023         History:   Past Medical History:   Diagnosis Date   • Arthritis    • Elevated cholesterol    • Hearing loss    • History of COVID-19 2021    blood test showed positive antibodies in 2021 (no symptoms)   • History of staph infection     Lost two vertebrae as a result of staph infection   • Hypercalcemia    • Hypertension    • Staph infection     C2-C3   • Wears glasses      Past  Surgical History:   Procedure Laterality Date   • APPENDECTOMY     • COLONOSCOPY     • HERNIA REPAIR Right     INGUINAL   • SPINE SURGERY     • TONSILLECTOMY     • TOTAL KNEE ARTHROPLASTY Left 2021    Procedure: TOTAL KNEE ARTHROPLASTY LEFT;  Surgeon: Connor Anderson MD;  Location: [facility];  Service: Orthopedics;  Laterality: Left;     Family History   Problem Relation Age of Onset   • Heart disease Mother    • Arthritis Mother    • Heart disease Father    • Heart disease Sister    • Asthma Sister    • No Known Problems Brother    • No Known Problems Sister      Social History     Tobacco Use   • Smoking status: Never   • Smokeless tobacco: Former     Types: Chew     Quit date:    Vaping Use   • Vaping Use: Never used   Substance Use Topics   • Alcohol use: Yes     Alcohol/week: 5.0 standard drinks     Types: 4 Cans of beer, 1 Shots of liquor per week     Comment: SOCIALLY   • Drug use: Never   He is  with 2 children.  He is retired from a  home.    Review of Systems  Pertinent items are noted in HPI, all other systems reviewed and negative    Vital Signs  /84 (BP Location: Right arm, Patient Position: Lying)   Pulse 65   Temp 97.5 °F (36.4 °C) (Oral)   Resp 20   SpO2 99%     Physical Exam:    General Appearance:    Alert, cooperative, in no acute distress   Head:    Normocephalic, without obvious abnormality, atraumatic   Eyes:            Lids and lashes normal, conjunctivae and sclerae normal, no   icterus, no pallor, corneas clear,    Ears:    Ears appear intact with no abnormalities noted   Throat:   No oral lesions, no thrush, oral mucosa moist   Neck:   No adenopathy, supple, trachea midline, no thyromegaly    Lungs:     Clear to auscultation,respirations regular, even and unlabored    Heart:    Regular rhythm and normal rate, normal S1 and S2, no murmur, no gallop   Abdomen:     Normal bowel sounds, no masses, no organomegaly, soft non-tender, non-distended, no  guarding, no rebound  tenderness   Genitalia:    Deferred   Extremities:  Right knee Ace wrap CDI.  Nerve block present.   Pulses:   Pulses palpable and equal bilaterally   Skin:   No bleeding, bruising or rash   Neurologic:   Cranial nerves 2 - 12 grossly intact. Flexion and dorsiflexion intact bilateral feet.        I reviewed the patient's new clinical results.     Lab Results   Component Value Date    HGBA1C 5.70 (H) 02/10/2023      Latest Reference Range & Units 02/10/23 12:45   Glucose 65 - 99 mg/dL 114 (H)   Sodium 136 - 145 mmol/L 139   Potassium 3.5 - 5.2 mmol/L 5.3 (H)   CO2 22.0 - 29.0 mmol/L 29.0   Chloride 98 - 107 mmol/L 103   Anion Gap 5.0 - 15.0 mmol/L 7.0   Creatinine 0.76 - 1.27 mg/dL 0.99   BUN 8 - 23 mg/dL 19   BUN/Creatinine Ratio 7.0 - 25.0  19.2   Calcium 8.6 - 10.5 mg/dL 11.4 (H)   eGFR >60.0 mL/min/1.73 82.5   Hemoglobin A1C 4.80 - 5.60 % 5.70 (H)   C-Reactive Protein 0.00 - 0.50 mg/dL 0.38   WBC 3.40 - 10.80 10*3/mm3 6.96   RBC 4.14 - 5.80 10*6/mm3 5.19   Hemoglobin 13.0 - 17.7 g/dL 15.8   Hematocrit 37.5 - 51.0 % 46.7   RDW 12.3 - 15.4 % 12.4   MCV 79.0 - 97.0 fL 90.0   MCH 26.6 - 33.0 pg 30.4   MCHC 31.5 - 35.7 g/dL 33.8   MPV 6.0 - 12.0 fL 9.7   Platelets 140 - 450 10*3/mm3 245   (H): Data is abnormally high    Assessment and Plan:     Status post total right knee replacement    Primary osteoarthritis of right knee    Hypertension    Elevated hemoglobin A1c      Plan  1. PT/OT- WBAT RLE  2. Pain control-prns, AC nerve block  3. IS-encourage  4. DVT proph- Mechs/ASA  5. Bowel regimen  6. Resume home medications as appropriate  7. Monitor post-op labs  8. DC planning for home, likely tomorrow    HTN  - Continue home lisinopril  - Monitor BP   - Holding parameters for BP meds  - Labetalol PRN for SBP>170    Elevated hemoglobin A1c: In prediabetic range  - Explained to patient implication of A1C elevation, need to modify diet and increase activity, and f/u with PCP.      Tamara Chávez,  ELVA  02/22/23  14:23 EST    Electronically signed by Tamara Chávez APRN at 02/22/23 1423     Mauri Diaz PA-C at 02/22/23 0708     Attestation signed by Connor Anderson MD at 02/22/23 0730    I have examined the patient and reviewed the above information.   I agree with the treatment plan and have discussed the plan with the patient who agrees to proceed.                    Pre-Op H&P  Ashok Chung  [id number]  1955    Full history and physical from office visit 2/21/2023 up-to-date.     Review of Systems:  General ROS: negative for chills, fever or skin lesions;  No changes since last office visit.  Neg for recent sick exposure.  Patient denies taking any anticoagulant or antiplatelet medications.  Cardiovascular ROS: no chest pain or dyspnea on exertion  Respiratory ROS: no cough, shortness of breath, or wheezing    Allergies: Denies allergy to latex or contrast dye.  Allergies   Allergen Reactions   • Penicillins Anaphylaxis and Hives   • Sulfa Antibiotics Hives       Immunization History:  Influenza: Yes  Pneumococcal: Yes  Tetanus: Yes    Vitals:           /90 (BP Location: Right arm, Patient Position: Lying)   Pulse 60   Temp 98.4 °F (36.9 °C) (Temporal)   Resp 18   SpO2 97%     Physical Exam:  General Appearance:    Alert, cooperative, no distress, appears stated age   Head:    Normocephalic, without obvious abnormality, atraumatic   Lungs:     Clear to auscultation bilaterally, respirations unlabored    Heart:   Regular rate and rhythm, S1 and S2 normal, no murmur, rub    or gallop    Abdomen:    Soft, nontender.  +bowel sounds   Breast Exam:    deferred   Genitalia:    deferred   Extremities:   Extremities normal, atraumatic, no cyanosis or edema   Skin:   Skin color, texture, turgor normal, no rashes or lesions   Neurologic:   Grossly intact   Results Review  LABS:  Lab Results   Component Value Date    WBC 6.96 02/10/2023    HGB 15.8 02/10/2023    HCT 46.7 02/10/2023     MCV 90.0 02/10/2023     02/10/2023    NEUTROABS 5.03 02/10/2023    GLUCOSE 114 (H) 02/10/2023    BUN 19 02/10/2023    CREATININE 0.99 02/10/2023    EGFRIFNONA 82 11/18/2021     02/10/2023    K 5.3 (H) 02/10/2023     02/10/2023    CO2 29.0 02/10/2023    PHOS 2.4 (L) 10/05/2022    CALCIUM 11.4 (H) 02/10/2023    ALBUMIN 4.80 10/05/2022       RADIOLOGY:  No radiology results for the last 3 days     I reviewed the patient's new clinical results.  CBC and CHEM profile from 2/10/2023 reviewed and available within patient's chart.    Cancer Staging (if applicable)  Cancer Patient: __ yes __no __unknown; If yes, clinical stage T:__ N:__M:__, stage group or __N/A    Impression: Patient presents with osteoarthritis of the right knee.    Plan: Dr. Kauffman will perform a total knee arthroplasty-RIGHT.       Mauri Diaz PA-C   02/22/23   7:09 AM EST           Electronically signed by Connor Anderson MD at 02/22/23 0787   Source Note              [facility] Note       Subjective     CC: Follow-up (3 month follow up -- Primary osteoarthritis of right knee)      IVAN Chung is a 67 y.o. male.  Patient is here today with his wife for his preop appointment for right TKA scheduled for tomorrow.    Overall, patient's symptoms are unchanged from his last visit on 11/17/2022.    ROS:    Constiutional:Pt denies fever, chills, nausea, or vomiting.  MSK:as above       Objective      Past Medical History  Past Medical History:   Diagnosis Date   • Arthritis    • Elevated cholesterol    • Hearing loss    • History of COVID-19 02/2021    blood test showed positive antibodies in Feb 2021 (no symptoms)   • History of staph infection 2004    Lost two vertebrae as a result of staph infection   • Hypercalcemia    • Hypertension    • Staph infection 2000    C2-C3   • Wears glasses      Social History     Socioeconomic History   • Marital status:    Tobacco Use   • Smoking  "status: Never   • Smokeless tobacco: Former     Types: Chew     Quit date: 1986   Vaping Use   • Vaping Use: Never used   Substance and Sexual Activity   • Alcohol use: Yes     Alcohol/week: 5.0 standard drinks     Types: 4 Cans of beer, 1 Shots of liquor per week     Comment: SOCIALLY   • Drug use: Never   • Sexual activity: Defer          Physical Exam  /70   Ht 170.2 cm (67.01\")   Wt 99.3 kg (219 lb)   BMI 34.29 kg/m²     Body mass index is 34.29 kg/m².    Patient is well nourished and well developed.        Ortho Exam      Musculoskeletal:  Global Assessment:  Overall assessment of Lower Extremity Muscle Strength and Tone:  Right quadriceps--5/5   Right hamstrings--5/5       Right tibialis anterior--5/5  Right gastroc-soleus--5/5  Right EHL --5/5    Lower Extremity:    Inspection and Palpation:  Right knee:  Tenderness:  Over the medial joint line and moderate severity  Effusion:  1+  Crepitus:  Positive  Pulses:  2+  Ecchymosis:  None  Warmth:  None     ROM:  Right:  Extension: 5    Flexion:120    Instability:    Right:  Lachman Test:  Negative   Varus stress test negative   Valgus stress test negative    Deformities/Malalignments/Discrepancies:    Left:  No deformities   Right:  Genu Varum    Functional Testing:  Te's test:  Negative  Patella grind test:  Positive  Q-angle:  normal          Imaging/Labs/EMG Reviewed:  Imaging Results (Last 24 Hours)     ** No results found for the last 24 hours. **            Assessment     Assessment:  1. Primary osteoarthritis of right knee        Plan:  1. Recommend over the counter anti-inflammatories for pain and/or swelling  2. Right knee arthritis--plans for right total knee arthroplasty tomorrow.  We again answered any questions and concerns that he and his wife had regarding the surgery.  He will start therapy the Friday after his surgery.  We will use vancomycin preoperatively make sure the full dose is infused before we make incision due to his " "history of staph.  Full dose aspirin daily for DVT prophylaxis.  He will stay 23 hours.  We will make sure that he is on the orthopedic floor if at all possible and gets informative and aggressive physical therapy      Connor Anderson MD  02/21/23  13:01 EST      Dictated Utilizing Dragon Dictation.    Electronically signed by Connor Anderson MD at 02/21/23 1303             Connor Anderson MD at 02/21/23 1050              [facility] Note       Subjective     CC: Follow-up (3 month follow up -- Primary osteoarthritis of right knee)      HPI    Ashok Chung is a 67 y.o. male.  Patient is here today with his wife for his preop appointment for right TKA scheduled for tomorrow.    Overall, patient's symptoms are unchanged from his last visit on 11/17/2022.    ROS:    Constiutional:Pt denies fever, chills, nausea, or vomiting.  MSK:as above       Objective      Past Medical History  Past Medical History:   Diagnosis Date   • Arthritis    • Elevated cholesterol    • Hearing loss    • History of COVID-19 02/2021    blood test showed positive antibodies in Feb 2021 (no symptoms)   • History of staph infection 2004    Lost two vertebrae as a result of staph infection   • Hypercalcemia    • Hypertension    • Staph infection 2000    C2-C3   • Wears glasses      Social History     Socioeconomic History   • Marital status:    Tobacco Use   • Smoking status: Never   • Smokeless tobacco: Former     Types: Chew     Quit date: 1986   Vaping Use   • Vaping Use: Never used   Substance and Sexual Activity   • Alcohol use: Yes     Alcohol/week: 5.0 standard drinks     Types: 4 Cans of beer, 1 Shots of liquor per week     Comment: SOCIALLY   • Drug use: Never   • Sexual activity: Defer          Physical Exam  /70   Ht 170.2 cm (67.01\")   Wt 99.3 kg (219 lb)   BMI 34.29 kg/m²     Body mass index is 34.29 kg/m².    Patient is well nourished and well developed.        Ortho " Exam      Musculoskeletal:  Global Assessment:  Overall assessment of Lower Extremity Muscle Strength and Tone:  Right quadriceps--5/5   Right hamstrings--5/5       Right tibialis anterior--5/5  Right gastroc-soleus--5/5  Right EHL --5/5    Lower Extremity:    Inspection and Palpation:  Right knee:  Tenderness:  Over the medial joint line and moderate severity  Effusion:  1+  Crepitus:  Positive  Pulses:  2+  Ecchymosis:  None  Warmth:  None     ROM:  Right:  Extension: 5    Flexion:120    Instability:    Right:  Lachman Test:  Negative   Varus stress test negative   Valgus stress test negative    Deformities/Malalignments/Discrepancies:    Left:  No deformities   Right:  Genu Varum    Functional Testing:  Te's test:  Negative  Patella grind test:  Positive  Q-angle:  normal          Imaging/Labs/EMG Reviewed:  Imaging Results (Last 24 Hours)     ** No results found for the last 24 hours. **            Assessment     Assessment:  1. Primary osteoarthritis of right knee        Plan:  3. Recommend over the counter anti-inflammatories for pain and/or swelling  4. Right knee arthritis--plans for right total knee arthroplasty tomorrow.  We again answered any questions and concerns that he and his wife had regarding the surgery.  He will start therapy the Friday after his surgery.  We will use vancomycin preoperatively make sure the full dose is infused before we make incision due to his history of staph.  Full dose aspirin daily for DVT prophylaxis.  He will stay 23 hours.  We will make sure that he is on the orthopedic floor if at all possible and gets informative and aggressive physical therapy      Connor Anderson MD  02/21/23  13:01 EST      Dictated Utilizing Dragon Dictation.    Electronically signed by Connor Anderson MD at 02/21/23 1303          Physician Progress Notes (most recent note)      Connor Anderson MD at 02/23/23 0802                    Orthopaedic Surgery Progress Note       LOS: 0 days   Patient Care Team:  Marina Cobb PA-C as PCP - General (Physician Assistant)  Margo Garg MD as Consulting Physician (Endocrinology)    POD 1    Subjective     Interval History:   Patient doing well this morning.  Having more thigh pain than anything else.  Denies chest pain or shortness of breath.  Has been able to get up and ambulate with PT.    Objective     Vital Signs:  Temp (24hrs), Av.6 °F (36.4 °C), Min:96.6 °F (35.9 °C), Max:98.1 °F (36.7 °C)    /59 (BP Location: Left arm, Patient Position: Lying)   Pulse 72   Temp 97.8 °F (36.6 °C) (Oral)   Resp 18   SpO2 99%     Labs:  Lab Results (last 24 hours)     Procedure Component Value Units Date/Time    Basic Metabolic Panel [[id number]]  (Abnormal) Collected: 23    Specimen: Blood Updated: 23     Glucose 116 mg/dL      BUN 24 mg/dL      Creatinine 0.78 mg/dL      Sodium 137 mmol/L      Potassium 5.0 mmol/L      Chloride 107 mmol/L      CO2 24.0 mmol/L      Calcium 9.6 mg/dL      BUN/Creatinine Ratio 30.8     Anion Gap 6.0 mmol/L      eGFR 96.5 mL/min/1.73     Narrative:      GFR Normal >60  Chronic Kidney Disease <60  Kidney Failure <15      CBC & Differential [[id number]]  (Abnormal) Collected: 23    Specimen: Blood Updated: 23    Narrative:      The following orders were created for panel order CBC & Differential.  Procedure                               Abnormality         Status                     ---------                               -----------         ------                     CBC Auto Differential[[id number]]        Abnormal            Final result                 Please view results for these tests on the individual orders.    CBC Auto Differential [[id number]]  (Abnormal) Collected: 23    Specimen: Blood Updated: 23     WBC 12.84 10*3/mm3      RBC 3.97 10*6/mm3      Hemoglobin 12.2 g/dL      Hematocrit 36.5 %      MCV 91.9 fL      MCH 30.7 pg       MCHC 33.4 g/dL      RDW 12.5 %      RDW-SD 42.3 fl      MPV 10.5 fL      Platelets 179 10*3/mm3      Neutrophil % 85.9 %      Lymphocyte % 5.5 %      Monocyte % 8.1 %      Eosinophil % 0.0 %      Basophil % 0.2 %      Immature Grans % 0.3 %      Neutrophils, Absolute 11.03 10*3/mm3      Lymphocytes, Absolute 0.71 10*3/mm3      Monocytes, Absolute 1.04 10*3/mm3      Eosinophils, Absolute 0.00 10*3/mm3      Basophils, Absolute 0.02 10*3/mm3      Immature Grans, Absolute 0.04 10*3/mm3      nRBC 0.0 /100 WBC           Physical Exam:  EHL, FHL, gastroc soleus, and tibialis anterior are intact  Toes are pink and warm  Surgical dressing is in place  Palpable dorsalis pedis pulse  Calf is soft and nontender    Postoperative x-ray has been reviewed and looks acceptable    Assessment & Plan     Postoperative day number 1 status post right total knee arthroplasty.    Labs: Hematocrit 37, creatinine 0.78, platelets 179,000    Pain Control: Good overall    PT and OT     DVT prophylaxis: Full dose aspirin daily beginning today and continuing for 6 weeks    Discharge planning: Anticipate discharge home this morning.  Prescriptions for hydrocodone 10 mg, aspirin, Zofran, Colace, Tylenol, ibuprofen and physical therapy have been written    Upon discharge, patient will need follow-up with me in 3 weeks' time.  They will need orthopedic and sports PT in [place] for physical therapy beginning tomorrow.  Standard Exofin Fusion dressing care instructions.  Do not remove.  DME per case management.    Admission Status:  I believe this patient meets INPATIENT status due to the need for care which can only be reasonably provided in a hospital setting such as aggressive/expedited ancillary services and/or consultation services, the necessity for IV medications, close physician monitoring and/or the possible need for procedures.  In such, I feel patient’s risk for adverse outcomes and need for care warrant INPATIENT evaluation and predict the  patient’s care encounter to likely last beyond 2 midnights.        Connor Anderson MD  23  08:02 EST      Electronically signed by Connor Anderson MD at 23 0804          Physical Therapy Notes (most recent note)      Michelle Everett, PT at 23 1506  Version 1 of 1         Patient Name: Ashok Chung  : 1955    MRN: [id number]                              Today's Date: 2023       Admit Date: 2023    Visit Dx:     ICD-10-CM ICD-9-CM   1. Postoperative pain  G89.18 338.18   2. Primary osteoarthritis of right knee  M17.11 715.16   3. Status post total left knee replacement  Z96.652 V43.65     Patient Active Problem List   Diagnosis   • OA (osteoarthritis) of knee   • Status post total left knee replacement   • Hypertension   • Postoperative pain   • Primary osteoarthritis of right knee   • Status post total right knee replacement   • Elevated hemoglobin A1c     Past Medical History:   Diagnosis Date   • Arthritis    • Elevated cholesterol    • Hearing loss    • History of COVID-19 2021    blood test showed positive antibodies in 2021 (no symptoms)   • History of staph infection     Lost two vertebrae as a result of staph infection   • Hypercalcemia    • Hypertension    • Staph infection     C2-C3   • Wears glasses      Past Surgical History:   Procedure Laterality Date   • APPENDECTOMY     • COLONOSCOPY     • HERNIA REPAIR Right     INGUINAL   • SPINE SURGERY     • TONSILLECTOMY     • TOTAL KNEE ARTHROPLASTY Left 2021    Procedure: TOTAL KNEE ARTHROPLASTY LEFT;  Surgeon: Connor Anderson MD;  Location: [facility];  Service: Orthopedics;  Laterality: Left;      General Information     Row Name 23 1824          Physical Therapy Time and Intention    Document Type evaluation  -HP     Mode of Treatment physical therapy  -HP     Row Name 23 1824          General Information    Patient Profile Reviewed yes  -HP     Prior Level of  Function min assist:;all household mobility;community mobility;gait;transfer;bed mobility;ADL's  -     Existing Precautions/Restrictions fall;other (see comments)  adductor canal nerve cath  -     Barriers to Rehab none identified  -     Row Name 02/22/23 1824          Living Environment    People in Home spouse  -     Row Name 02/22/23 1824          Home Main Entrance    Number of Stairs, Main Entrance four  -HP     Row Name 02/22/23 1824          Stairs Within Home, Primary    Number of Stairs, Within Home, Primary none  -HP     Row Name 02/22/23 1824          Cognition    Orientation Status (Cognition) oriented x 3  -     Row Name 02/22/23 1824          Safety Issues, Functional Mobility    Safety Issues Affecting Function (Mobility) insight into deficits/self-awareness;awareness of need for assistance;safety precaution awareness  -     Impairments Affecting Function (Mobility) balance;endurance/activity tolerance;pain;strength;range of motion (ROM)  -           User Key  (r) = Recorded By, (t) = Taken By, (c) = Cosigned By    Initials Name Provider Type     Michelle Everett PT Physical Therapist               Mobility     Row Name 02/22/23 1826          Bed Mobility    Bed Mobility supine-sit  -     Supine-Sit [place] (Bed Mobility) modified independence  -     Row Name 02/22/23 1826          Sit-Stand Transfer    Sit-Stand [place] (Transfers) contact guard;2 person assist  -     Assistive Device (Sit-Stand Transfers) walker, front-wheeled  -HP     Comment, (Sit-Stand Transfer) VC for hand placement  -     Row Name 02/22/23 1826          Gait/Stairs (Locomotion)    [place] Level (Gait) contact guard;2 person assist  -     Assistive Device (Gait) walker, front-wheeled  -HP     Ambulated day of surgery or within 4 hours of PACU discharge yes  -HP     Distance in Feet (Gait) 310  -HP     Deviations/Abnormal Patterns (Gait) bilateral deviations;base of support, wide;gait speed  decreased;stride length decreased;weight shifting decreased  -     Bilateral Gait Deviations forward flexed posture  -     Comment, (Gait/Stairs) Pt amb 310' with FWW and CGAx2. No knee buckling or LOB noted. Pt demonstrated step-through gait pattern and forward flexed posture. VC for increased knee extension and posture correction. Activity limited by fatigue.  -     Row Name 02/22/23 1826          Mobility    Extremity Weight-bearing Status right lower extremity  -     Right Lower Extremity (Weight-bearing Status) weight-bearing as tolerated (WBAT)  -           User Key  (r) = Recorded By, (t) = Taken By, (c) = Cosigned By    Initials Name Provider Type     Michelle Everett, PT Physical Therapist               Obj/Interventions     Row Name 02/22/23 1827          Range of Motion Comprehensive    General Range of Motion lower extremity range of motion deficits identified  -     Comment, General Range of Motion R knee ROM 17-70  -[facility] Name 02/22/23 1827          Strength Comprehensive (MMT)    General Manual Muscle Testing (MMT) Assessment lower extremity strength deficits identified  -     Comment, General Manual Muscle Testing (MMT) Assessment Pt able to perform B active ankle DF and SLR  -     Row Name 02/22/23 1827          Motor Skills    Therapeutic Exercise knee;ankle  -[facility] Name 02/22/23 1827          Knee (Therapeutic Exercise)    Knee (Therapeutic Exercise) strengthening exercise;isometric exercises  -     Knee Isometrics (Therapeutic Exercise) right;quad sets;10 repetitions  -     Knee Strengthening (Therapeutic Exercise) right;SLR (straight leg raise);LAQ (long arc quad);heel slides;3 repetitions  -[facility] Name 02/22/23 1827          Ankle (Therapeutic Exercise)    Ankle (Therapeutic Exercise) AROM (active range of motion)  -     Ankle AROM (Therapeutic Exercise) bilateral;dorsiflexion;plantarflexion;10 repetitions  -[facility] Name 02/22/23 1827          Balance     Balance Assessment sitting static balance;sitting dynamic balance;sit to stand dynamic balance;standing static balance;standing dynamic balance  -     Static Sitting Balance standby assist  -     Dynamic Sitting Balance standby assist  -     Position, Sitting Balance sitting edge of bed  -     Static Standing Balance contact guard  -HP     Dynamic Standing Balance contact guard  -     Position/Device Used, Standing Balance supported;walker, rolling  -HP     Balance Interventions sitting;standing;sit to stand;occupation based/functional task  -     Row Name 02/22/23 1827          Sensory Assessment (Somatosensory)    Sensory Assessment (Somatosensory) LE sensation intact  -           User Key  (r) = Recorded By, (t) = Taken By, (c) = Cosigned By    Initials Name Provider Type     Michelle Everett, PT Physical Therapist               Goals/Plan     Row Name 02/22/23 1830          Bed Mobility Goal 1 (PT)    Activity/Assistive Device (Bed Mobility Goal 1, PT) sit to supine/supine to sit  -HP     [place] Level/Cues Needed (Bed Mobility Goal 1, PT) independent  -HP     Time Frame (Bed Mobility Goal 1, PT) long term goal (LTG);3 days  -HP     Progress/Outcomes (Bed Mobility Goal 1, PT) goal ongoing  -     Row Name 02/22/23 1830          Transfer Goal 1 (PT)    Activity/Assistive Device (Transfer Goal 1, PT) sit-to-stand/stand-to-sit  -HP     [place] Level/Cues Needed (Transfer Goal 1, PT) modified independence  -HP     Time Frame (Transfer Goal 1, PT) long term goal (LTG);3 days  -HP     Progress/Outcome (Transfer Goal 1, PT) goal ongoing  -     Row Name 02/22/23 1830          Gait Training Goal 1 (PT)    Activity/Assistive Device (Gait Training Goal 1, PT) gait (walking locomotion)  -HP     [place] Level (Gait Training Goal 1, PT) modified independence  -HP     Time Frame (Gait Training Goal 1, PT) long term goal (LTG);3 days  -HP     Progress/Outcome (Gait Training Goal 1, PT) goal  ongoing  -     Row Name 02/22/23 1830          ROM Goal 1 (PT)    ROM Goal 1 (PT) R knee ROM 0-90  -HP     Time Frame (ROM Goal 1, PT) long-term goal (LTG);3 days  -HP     Progress/Outcome (ROM Goal 1, PT) goal ongoing  -     Row Name 02/22/23 1830          Stairs Goal 1 (PT)    Activity/Assistive Device (Stairs Goal 1, PT) ascending stairs;descending stairs  -HP     [place] Level/Cues Needed (Stairs Goal 1, PT) modified independence  -HP     Number of Stairs (Stairs Goal 1, PT) 4  -HP     Time Frame (Stairs Goal 1, PT) long term goal (LTG);3 days  -HP     Progress/Outcome (Stairs Goal 1, PT) goal ongoing  -     Row Name 02/22/23 1830          Therapy Assessment/Plan (PT)    Planned Therapy Interventions (PT) bed mobility training;gait training;home exercise program;balance training;patient/family education;transfer training;stretching;strengthening;stair training;ROM (range of motion)  -           User Key  (r) = Recorded By, (t) = Taken By, (c) = Cosigned By    Initials Name Provider Type     Michelle Everett, PT Physical Therapist               Clinical Impression     Row Name 02/22/23 1828          Pain    Pretreatment Pain Rating 7/10  -HP     Posttreatment Pain Rating 7/10  -HP     Pain Location - Side/Orientation Right  -HP     Pain Location generalized  -HP     Pain Location - knee  -HP     Pain Intervention(s) Repositioned;Cold applied;Ambulation/increased activity;Elevated  -     Row Name 02/22/23 1828          Plan of Care Review    Plan of Care Reviewed With spouse;patient  -HP     Progress no change  -HP     Outcome Evaluation PT eval complete. Pt performed bed mobility with SBA. Pt amb 310' with FWW and CGAx2. No knee buckling or LOB noted. Activity limited by fatigue. HEP and precautions reviewed with pt. Recommend d/c home with assist and OPPT when medically appropriate.  -     Row Name 02/22/23 1828          Therapy Assessment/Plan (PT)    Rehab Potential (PT) good, to achieve  stated therapy goals  -     Criteria for Skilled Interventions Met (PT) yes;skilled treatment is necessary;meets criteria  -     Therapy Frequency (PT) 2 times/day  -     Row Name 02/22/23 1828          Vital Signs    Pre Systolic BP Rehab --  VSS  -     Pre Patient Position Supine  -HP     Intra Patient Position Standing  -HP     Post Patient Position Sitting  -     Row Name 02/22/23 1828          Positioning and Restraints    Pre-Treatment Position in bed  -HP     Post Treatment Position chair  -HP     In Chair notified nsg;reclined;sitting;call light within reach;encouraged to call for assist;exit alarm on;with family/caregiver;legs elevated;compression device  -           User Key  (r) = Recorded By, (t) = Taken By, (c) = Cosigned By    Initials Name Provider Type     Michelle Everett, PT Physical Therapist               Outcome Measures     Row Name 02/22/23 1831 02/22/23 1150       How much help from another person do you currently need...    Turning from your back to your side while in flat bed without using bedrails? 4  -HP 3  -BC    Moving from lying on back to sitting on the side of a flat bed without bedrails? 4  -HP 3  -BC    Moving to and from a bed to a chair (including a wheelchair)? 3  -HP 3  -BC    Standing up from a chair using your arms (e.g., wheelchair, bedside chair)? 3  -HP 2  -BC    Climbing 3-5 steps with a railing? 3  -HP 2  -BC    To walk in hospital room? 3  -HP 2  -BC    AM-PAC 6 Clicks Score (PT) 20  -HP 15  -BC    Highest level of mobility 6 --> Walked 10 steps or more  - 4 --> Transferred to chair/commode  -BC    Row Name 02/22/23 1831          PADD    Diagnosis 1  -HP     Gender 2  -HP     Age Group 1  -HP     Gait Distance 1  -HP     Assist Level 1  -HP     Home Support 3  -HP     PADD Score 9  -HP     Patient Preference home with outpatient rehab  -     Prediction by PADD Score directly home (with home health or out-patient rehab)  -     Row Name 02/22/23 1831           Functional Assessment    Outcome Measure Options AM-PAC 6 Clicks Basic Mobility (PT);PADD  -           User Key  (r) = Recorded By, (t) = Taken By, (c) = Cosigned By    Initials Name Provider Type    BC Pretty Flores, RN Registered Nurse     Michelle Everett, PT Physical Therapist                             Physical Therapy Education     Title: PT OT SLP Therapies (In Progress)     Topic: Physical Therapy (Done)     Point: Mobility training (Done)     Learning Progress Summary           Patient Acceptance, E,D, VU,NR by  at 2/22/2023 1831                   Point: Home exercise program (Done)     Learning Progress Summary           Patient Acceptance, E,D, VU,NR by  at 2/22/2023 1831                   Point: Body mechanics (Done)     Learning Progress Summary           Patient Acceptance, E,D, VU,NR by  at 2/22/2023 1831                   Point: Precautions (Done)     Learning Progress Summary           Patient Acceptance, E,D, VU,NR by  at 2/22/2023 1831                               User Key     Initials Effective Dates Name Provider Type Discipline     06/01/21 -  Michelle Everett, WILLIAM Physical Therapist PT              PT Recommendation and Plan  Planned Therapy Interventions (PT): bed mobility training, gait training, home exercise program, balance training, patient/family education, transfer training, stretching, strengthening, stair training, ROM (range of motion)  Plan of Care Reviewed With: spouse, patient  Progress: no change  Outcome Evaluation: PT eval complete. Pt performed bed mobility with SBA. Pt amb 310' with FWW and CGAx2. No knee buckling or LOB noted. Activity limited by fatigue. HEP and precautions reviewed with pt. Recommend d/c home with assist and OPPT when medically appropriate.     Time Calculation:    PT Charges     Row Name 02/22/23 1506             Time Calculation    Start Time 1506  -      PT Received On 02/22/23  -      PT Goal Re-Cert Due Date 03/04/23  -          Timed Charges    14862 - PT Therapeutic Exercise Minutes 10  -HP         Untimed Charges    PT Eval/Re-eval Minutes 50  -HP         Total Minutes    Timed Charges Total Minutes 10  -HP      Untimed Charges Total Minutes 50  -HP       Total Minutes 60  -HP            User Key  (r) = Recorded By, (t) = Taken By, (c) = Cosigned By    Initials Name Provider Type    HP Michelle Everett, PT Physical Therapist              Therapy Charges for Today     Code Description Service Date Service Provider Modifiers Qty    37893474838 HC PT THER PROC EA 15 MIN 2/22/2023 Michelle Everett, PT GP 1    38775900628 HC PT EVAL LOW COMPLEXITY 4 2/22/2023 Michelle Everett, PT GP 1    1953622 HC PT THER SUPP EA 15 MIN 2/22/2023 Michelle Everett, PT GP 3          PT G-Codes  Outcome Measure Options: AM-PAC 6 Clicks Basic Mobility (PT), PADD  AM-PAC 6 Clicks Score (PT): 20  PT Discharge Summary  Anticipated Discharge Disposition (PT): home with assist, home with outpatient therapy services    Michelle Everett, PT  2/22/2023      Electronically signed by Michelle Everett, PT at 02/22/23 9611

## 2023-02-23 NOTE — PLAN OF CARE
Goal Outcome Evaluation:  Plan of Care Reviewed With: patient        Progress: improving  Outcome Evaluation: Pt. presents below baseline function w/decreased strength, AROM and increased pain affecting his ability to safely participate in functional mobility. He performed transfers w/contact guard assist. He ambulated 50' + 50' w/front wheeled walker, contact guard assist. He navigated 8 steps w/L ascending handrail and R single touch cane. No buckling or loss of balance noted. Pt. tolerated progression in ther-ex well to address strength deficits. Reviewed knee precautions, car transfers. Recommend home with family assist and home health PT upon discharge.

## 2023-02-23 NOTE — PLAN OF CARE
Problem: Adult Inpatient Plan of Care  Goal: Plan of Care Review  Recent Flowsheet Documentation  Taken 2/23/2023 0947 by Sasha Machado OT  Progress: improving  Plan of Care Reviewed With: patient  Outcome Evaluation: Pt presents with strength, balance, and ROM deficits limiting mobility and self-care performance. Up in room/bathroom with RW support and CGAx1. Good effort and no knee buckling this session. Education/demonstration completed for precautions and ADL retraining. OT will d/c at this time. Anticipate pt's d/c to home today following PT. No DME needs.

## 2023-02-23 NOTE — PLAN OF CARE
Goal Outcome Evaluation:  Plan of Care Reviewed With: patient        Progress: improving       AOx4, VSS, RA, pain somewhat controlled with round the clock dosing, ICE and infupump, ambulated in barrios with 1 assist, walker and gait, immobilizer placed to prevent buckling r/t weakness at beginning of shift, slept minimal throughout night, ace wrap in place

## 2023-02-24 ENCOUNTER — TELEPHONE (OUTPATIENT)
Dept: ORTHOPEDIC SURGERY | Facility: CLINIC | Age: 68
End: 2023-02-24
Payer: MEDICARE

## 2023-02-24 NOTE — TELEPHONE ENCOUNTER
PATIENT CALLED AND STATED THAT HE HAD A FALL AT 4:30AM THIS MORNING AND IS SCHEDULED FOR OUTPATIENT THERAPY AT 9:30AM THIS MORNING BUT HE'S NOT SURE IF HE SHOULD GO DUE TO RECENT FALL. COULD SOMEONE PLEASE CALL PATIENT?    THANK YOU.

## 2023-02-24 NOTE — TELEPHONE ENCOUNTER
Called patient back. He states that he fell this morning transferring from the toilet to his walker. He did not hit his knee. He fell backward and hit more his buttock area. He states that his incision is a little more sore than yesterday, but otherwise feeling ok. I spoke with Mary Kay and she advised that he still attend therapy today so that they can assess him and remove bandages, etc. If they are concerned for anything further, he will call back ad we will see him today in clinic. He understood.     Tamara

## 2023-02-24 NOTE — PROGRESS NOTES
ESTHELA Fernandes    Nerve Cath Post Op Call    Patient Name: Ashok Chung  :  1955  MRN:  [id number]  Date of Discharge: 2023    Nerve Cath Post Op Call:    Catheter Plan:See [employer] note for call or text communication  Patient/Family instructed to call ON CALL anesthesia provider for any questions or problems.  Patient Follow Up:

## 2023-02-27 ENCOUNTER — TELEPHONE (OUTPATIENT)
Dept: ORTHOPEDIC SURGERY | Facility: CLINIC | Age: 68
End: 2023-02-27
Payer: MEDICARE

## 2023-02-27 NOTE — TELEPHONE ENCOUNTER
Hello,    Patient called and wanted to speak with Tamara. Call back number is [phone].    Dr. Anderson's team, please advice.    Cher Braga

## 2023-02-27 NOTE — TELEPHONE ENCOUNTER
Patient called in today wanting to know if it was ok for him to take zyrtec at this point after surgery. I let him know that there was no reason he should not be able to take it. He understood. He will call back with any further problems or questions.     Tamara

## 2023-03-01 DIAGNOSIS — Z96.651 STATUS POST TOTAL RIGHT KNEE REPLACEMENT: ICD-10-CM

## 2023-03-01 RX ORDER — HYDROCODONE BITARTRATE AND ACETAMINOPHEN 10; 325 MG/1; MG/1
1 TABLET ORAL EVERY 6 HOURS PRN
Qty: 30 TABLET | Refills: 0 | Status: SHIPPED | OUTPATIENT
Start: 2023-03-01 | End: 2023-03-10 | Stop reason: SDUPTHER

## 2023-03-01 NOTE — TELEPHONE ENCOUNTER
Dr. Anderson-please advise [place] refill for pt; 7 days s/p TKA 2/22/23. Thanks!    Mu MATIAS CMA ([facility]), ROT

## 2023-03-10 DIAGNOSIS — Z96.651 STATUS POST TOTAL RIGHT KNEE REPLACEMENT: ICD-10-CM

## 2023-03-10 RX ORDER — HYDROCODONE BITARTRATE AND ACETAMINOPHEN 10; 325 MG/1; MG/1
1 TABLET ORAL EVERY 6 HOURS PRN
Qty: 30 TABLET | Refills: 0 | Status: SHIPPED | OUTPATIENT
Start: 2023-03-10 | End: 2023-03-16 | Stop reason: SDUPTHER

## 2023-03-16 ENCOUNTER — OFFICE VISIT (OUTPATIENT)
Dept: ORTHOPEDIC SURGERY | Facility: CLINIC | Age: 68
End: 2023-03-16
Payer: MEDICARE

## 2023-03-16 VITALS — TEMPERATURE: 97.5 F

## 2023-03-16 DIAGNOSIS — Z96.651 STATUS POST TOTAL RIGHT KNEE REPLACEMENT: Primary | ICD-10-CM

## 2023-03-16 PROCEDURE — 99024 POSTOP FOLLOW-UP VISIT: CPT | Performed by: ORTHOPAEDIC SURGERY

## 2023-03-16 RX ORDER — HYDROCODONE BITARTRATE AND ACETAMINOPHEN 10; 325 MG/1; MG/1
1 TABLET ORAL EVERY 6 HOURS PRN
Qty: 30 TABLET | Refills: 0 | Status: SHIPPED | OUTPATIENT
Start: 2023-03-16

## 2023-03-16 NOTE — PROGRESS NOTES
[facility] [place] Orthopaedic Surgery Clinic Note        Subjective     Post-op (3 weeks s/p Total Knee Arthroplasty- Right  2/22/23)       IVAN Chung is a 67 y.o. male.  Patient returns with his wife today for 3-week follow-up after right total knee arthroplasty on 2/22/2023.  He had a fall postoperatively but feels like he has gotten better over all since the fall.  He is sleeping better overall.          Objective      Physical Exam  Temp 97.5 °F (36.4 °C)     There is no height or weight on file to calculate BMI.        Ortho Exam        Lower Extremity:     Inspection and Palpation:      Right knee:  Calf:  Soft and non tender  Pulses:  2+  Ecchymosis:  None  Warmth:  Appropriate  Incision:  Clean, dry, and intact.  Healing appropriately  Assistive Device: Rolling walker    ROM:  Right:  Extension: 10    flexion: 110      Functional Testing:  Right:  Straight Leg Raise: 5/5      Imaging Reviewed:  Imaging Results (Last 24 Hours)     Procedure Component Value Units Date/Time    XR Knee 3+ View With [place] Right [[id number]] Resulted: 03/16/23 1151     Updated: 03/16/23 1151    Narrative:      Knee X-ray    Indication: status-post TKA    Study:  AP, Lateral, and Sunrise views of Right knee    Comparison: Right knee 2/22/2023    Findings:  No signs of acute fracture are visualized  No signs of loosening are appreciated  Components are well aligned    Impression:  Status post Right total knee arthroplasty. No signs of   loosening or fracture.                Assessment    Assessment:  1. Status post total right knee replacement        Plan:  1. Status post right total knee arthroplasty--patient doing well overall.  He is better off than he was at the 3-week point with his contralateral knee.  He should continue to be vigilant about restoring terminal extension and flexion.  Continue exercise bike.  Continue full dose aspirin daily for DVT prophylaxis.  Reassess in 3 weeks.  Refill his pain medication.      Connor  Thor Anderson MD  03/16/23  12:42 EDT      Dictated Utilizing Dragon Dictation.

## 2023-03-21 RX ORDER — SENNOSIDES 8.6 MG
650 CAPSULE ORAL EVERY 8 HOURS
Qty: 90 TABLET | Refills: 0 | Status: SHIPPED | OUTPATIENT
Start: 2023-03-21

## 2023-03-21 RX ORDER — IBUPROFEN 600 MG/1
600 TABLET ORAL EVERY 6 HOURS PRN
Qty: 90 TABLET | Refills: 0 | Status: SHIPPED | OUTPATIENT
Start: 2023-03-21

## 2023-03-21 RX ORDER — ASPIRIN 325 MG
325 TABLET, DELAYED RELEASE (ENTERIC COATED) ORAL DAILY
Qty: 42 TABLET | Refills: 0 | Status: SHIPPED | OUTPATIENT
Start: 2023-03-21 | End: 2023-03-21

## 2023-04-11 ENCOUNTER — OFFICE VISIT (OUTPATIENT)
Dept: ORTHOPEDIC SURGERY | Facility: CLINIC | Age: 68
End: 2023-04-11
Payer: MEDICARE

## 2023-04-11 DIAGNOSIS — Z96.651 STATUS POST TOTAL RIGHT KNEE REPLACEMENT: Primary | ICD-10-CM

## 2023-04-11 PROCEDURE — 99024 POSTOP FOLLOW-UP VISIT: CPT | Performed by: ORTHOPAEDIC SURGERY

## 2023-04-11 RX ORDER — DOXYCYCLINE 100 MG/1
100 TABLET ORAL 2 TIMES DAILY
Qty: 14 TABLET | Refills: 0 | Status: SHIPPED | OUTPATIENT
Start: 2023-04-11 | End: 2023-04-18

## 2023-04-11 RX ORDER — ASPIRIN 325 MG
TABLET, DELAYED RELEASE (ENTERIC COATED) ORAL
COMMUNITY
Start: 2023-03-21

## 2023-04-11 NOTE — PROGRESS NOTES
[facility] [place] Orthopaedic Surgery Clinic Note        Subjective     Post-op (3 week recheck - 6 weeks s/p Total Knee Arthroplasty- Right 2/22/23)       HPI    Ashok Chung is a 67 y.o. male.  Patient is here with his wife today now 6 weeks out from right total knee arthroplasty on 2/22/2023.  He is doing well overall.  Had a couple of areas over his incision with a little bit of drainage, especially at the superior most aspect of the incision.  Denies fever or chills at home.  Has a history of staph and so he has been worried all weekend.          Objective      Physical Exam  There were no vitals taken for this visit.    There is no height or weight on file to calculate BMI.        Ortho Exam        Lower Extremity:     Inspection and Palpation:      Right knee:  Calf:  Soft and non tender  Effusion:  None  Pulses:  2+  Warmth:  None  Incision:  Healed with small area of erythema at the superior aspect of the incision with no obvious drainage noted.  1 prominent Vicryl at the upper two thirds of the incision.    ROM:  Right:  Extension:0    Flexion:120      Deformities/Malalignments/Discrepancies:    Right:  none    Functional Testing:  Right:  Straight Leg Raise: 5/5  Patella Tracking:  Normal      Imaging Reviewed:  Imaging Results (Last 24 Hours)     ** No results found for the last 24 hours. **            Assessment    Assessment:  1. Status post total right knee replacement        Plan:  1. Status post right total knee arthroplasty--patient is a couple months out at this point.  Plan will be to remove the prominent sutures and put him on 7 days of doxycycline as a precautionary measure although I do not believe he is got a true infection.  Just believe he is having some reaction to the Vicryl or Monocryl below the skin.  Follow-up in a month for reevaluation.  They have been instructed to call if things worsen or do not improve.  Told him to take Kefir to reduce the risk of C. difficile.      Connor Mcrae  MD Justin  04/11/23  12:51 EDT      Dictated Utilizing Dragon Dictation.

## 2023-05-11 ENCOUNTER — OFFICE VISIT (OUTPATIENT)
Dept: ORTHOPEDIC SURGERY | Facility: CLINIC | Age: 68
End: 2023-05-11
Payer: MEDICARE

## 2023-05-11 DIAGNOSIS — Z96.651 STATUS POST TOTAL RIGHT KNEE REPLACEMENT: Primary | ICD-10-CM

## 2023-05-11 NOTE — PROGRESS NOTES
[facility] [place] Orthopaedic Surgery Clinic Note        Subjective     Post-op (1 month recheck - 2.5 months s/p Total Knee Arthroplasty- Right 2/22/23)       IVAN Chung is a 67 y.o. male.  Patient is here for follow-up now 3 months out from right TKA on 2/22/2023.  He is here with his wife today.  He says he is doing well overall.  He is scheduled to be released from physical therapy next week.  Patient had an issue with a suture abscess last month which has resolved.          Objective      Physical Exam  There were no vitals taken for this visit.    There is no height or weight on file to calculate BMI.        Ortho Exam        Lower Extremity:     Inspection and Palpation:      Right knee:  Calf:  Soft and non tender  Effusion:  None  Pulses:  2+  Warmth:  None  Incision:  Healed     ROM:  Right:  Extension:0    Flexion:120      Deformities/Malalignments/Discrepancies:    Right:  none    Functional Testing:  Right:  Straight Leg Raise: 5/5  Patella Tracking:  Normal      Imaging Reviewed:  Imaging Results (Last 24 Hours)     ** No results found for the last 24 hours. **            Assessment    Assessment:  1. Status post total right knee replacement        Plan:  1. Status post right TKA--patient doing great overall.  Follow-up in 3 months.  Continue indefinite antibiotic prophylaxis.  He is also at the 18-month point after left TKA on 11/17/2021.  Doing well from that as well.      Connor Anderson MD  05/11/23  09:48 EDT      Dictated Utilizing Dragon Dictation.

## 2023-08-29 ENCOUNTER — OFFICE VISIT (OUTPATIENT)
Dept: ORTHOPEDIC SURGERY | Facility: CLINIC | Age: 68
End: 2023-08-29
Payer: MEDICARE

## 2023-08-29 VITALS
SYSTOLIC BLOOD PRESSURE: 122 MMHG | HEIGHT: 67 IN | DIASTOLIC BLOOD PRESSURE: 80 MMHG | BODY MASS INDEX: 34.37 KG/M2 | WEIGHT: 219 LBS

## 2023-08-29 DIAGNOSIS — Z96.652 STATUS POST TOTAL LEFT KNEE REPLACEMENT: ICD-10-CM

## 2023-08-29 DIAGNOSIS — Z96.651 STATUS POST TOTAL RIGHT KNEE REPLACEMENT: Primary | ICD-10-CM

## 2023-08-29 PROCEDURE — 3074F SYST BP LT 130 MM HG: CPT | Performed by: ORTHOPAEDIC SURGERY

## 2023-08-29 PROCEDURE — 99212 OFFICE O/P EST SF 10 MIN: CPT | Performed by: ORTHOPAEDIC SURGERY

## 2023-08-29 PROCEDURE — 3079F DIAST BP 80-89 MM HG: CPT | Performed by: ORTHOPAEDIC SURGERY

## 2023-08-29 NOTE — PROGRESS NOTES
"    [facility] [place] [facility] [place]        Subjective     CC: Follow-up (3m f/u; s/p right TKA on 2/22/2023)      HPI    Ashok Chung is a 68 y.o. male.  Patient is here today for follow-up now 6 months out from right TKA on 2/22/2023.  He is having very little problems with his right knee.  He says the left knee occasionally swells on him.    Overall, patient's symptoms are as above.    ROS:    Constiutional:Pt denies fever, chills, nausea, or vomiting.  MSK:as above        Objective      Past Medical History  Past Medical History:   Diagnosis Date    Arthritis     Elevated cholesterol     Hearing loss     History of COVID-19 02/2021    blood test showed positive antibodies in Feb 2021 (no symptoms)    History of staph infection 2004    Lost two vertebrae as a result of staph infection    Hypercalcemia     Hypertension     Staph infection 2000    C2-C3    Wears glasses      Social History     Socioeconomic History    Marital status:    Tobacco Use    Smoking status: Never    Smokeless tobacco: Former     Types: Chew     Quit date: 1986   Vaping Use    Vaping Use: Never used   Substance and Sexual Activity    Alcohol use: Yes     Alcohol/week: 5.0 standard drinks     Types: 4 Cans of beer, 1 Shots of liquor per week     Comment: SOCIALLY    Drug use: Never    Sexual activity: Defer          Physical Exam  /80   Ht 170.2 cm (67.01\")   Wt 99.3 kg (219 lb)   BMI 34.29 kg/mý     Body mass index is 34.29 kg/mý.    Patient is well nourished and well developed.        Ortho Exam        Lower Extremity:     Inspection and Palpation:      Right knee:  Calf:  Soft and non tender  Effusion:  None  Pulses:  2+  Warmth:  None  Incision:  Healed     ROM:  Right:  Extension:0    Flexion:120      Deformities/Malalignments/Discrepancies:    Right:  none    Functional Testing:  Right:  Straight Leg Raise: 5/5  Patella Tracking:  Normal      Imaging/Labs/EMG Reviewed:  Imaging Results (Last 24 Hours)       ** " No results found for the last 24 hours. **              Assessment    Assessment:  1. Status post total right knee replacement    2. Status post total left knee replacement        Plan:  Recommend over the counter anti-inflammatories for pain and/or swelling  Status post bilateral total knee arthroplasties--patient will follow-up in 6 months with an x-ray of both knees.  Continue indefinite antibiotic prophylaxis.  Overall he is doing quite well.      Connor Anderson MD  08/29/23  10:14 EDT      Dictated Utilizing Dragon Dictation.

## 2023-12-19 ENCOUNTER — LAB (OUTPATIENT)
Dept: LAB | Facility: HOSPITAL | Age: 68
End: 2023-12-19
Payer: MEDICARE

## 2023-12-19 DIAGNOSIS — E21.3 HYPERPARATHYROIDISM: ICD-10-CM

## 2023-12-19 LAB
ALBUMIN SERPL-MCNC: 4.6 G/DL (ref 3.5–5.2)
ANION GAP SERPL CALCULATED.3IONS-SCNC: 12.3 MMOL/L (ref 5–15)
BUN SERPL-MCNC: 17 MG/DL (ref 8–23)
BUN/CREAT SERPL: 17.2 (ref 7–25)
CALCIUM SPEC-SCNC: 11.1 MG/DL (ref 8.6–10.5)
CHLORIDE SERPL-SCNC: 105 MMOL/L (ref 98–107)
CO2 SERPL-SCNC: 20.7 MMOL/L (ref 22–29)
CREAT SERPL-MCNC: 0.99 MG/DL (ref 0.76–1.27)
EGFRCR SERPLBLD CKD-EPI 2021: 81.9 ML/MIN/1.73
GLUCOSE SERPL-MCNC: 94 MG/DL (ref 65–99)
PHOSPHATE SERPL-MCNC: 2.7 MG/DL (ref 2.5–4.5)
POTASSIUM SERPL-SCNC: 4.8 MMOL/L (ref 3.5–5.2)
PTH-INTACT SERPL-MCNC: 71.6 PG/ML (ref 15–65)
SODIUM SERPL-SCNC: 138 MMOL/L (ref 136–145)

## 2023-12-19 PROCEDURE — 80069 RENAL FUNCTION PANEL: CPT

## 2023-12-19 PROCEDURE — 83970 ASSAY OF PARATHORMONE: CPT

## 2023-12-19 PROCEDURE — 82306 VITAMIN D 25 HYDROXY: CPT

## 2023-12-20 LAB — 25(OH)D3 SERPL-MCNC: 55.3 NG/ML (ref 30–100)

## 2023-12-27 ENCOUNTER — OFFICE VISIT (OUTPATIENT)
Dept: ENDOCRINOLOGY | Facility: CLINIC | Age: 68
End: 2023-12-27
Payer: MEDICARE

## 2023-12-27 VITALS
WEIGHT: 234 LBS | HEIGHT: 67 IN | SYSTOLIC BLOOD PRESSURE: 146 MMHG | DIASTOLIC BLOOD PRESSURE: 78 MMHG | BODY MASS INDEX: 36.73 KG/M2 | HEART RATE: 66 BPM | OXYGEN SATURATION: 96 %

## 2023-12-27 DIAGNOSIS — E21.3 HYPERPARATHYROIDISM: Primary | ICD-10-CM

## 2023-12-27 PROCEDURE — 3078F DIAST BP <80 MM HG: CPT | Performed by: INTERNAL MEDICINE

## 2023-12-27 PROCEDURE — 99213 OFFICE O/P EST LOW 20 MIN: CPT | Performed by: INTERNAL MEDICINE

## 2023-12-27 PROCEDURE — 3077F SYST BP >= 140 MM HG: CPT | Performed by: INTERNAL MEDICINE

## 2023-12-27 NOTE — PROGRESS NOTES
"Chief Complaint   Patient presents with    Hyperparathyroidism     Follow up         HPI   Ashok Chung is a 68 y.o. male had concerns including Hyperparathyroidism (Follow up ).      Patient reports no significant health changes in the interim since last visit.  He completed labs prior to visit which were reviewed.  He did reduce vitamin D supplementation approximately 6 to 8 weeks ago he reports he is currently taking 2000 international units daily.  He is not taking any calcium.  He does report some constipation which he is able to manage with daily fiber intake.    The following portions of the patient's history were reviewed and updated as appropriate: allergies, current medications, and past social history.    Review of Systems   Gastrointestinal:  Positive for constipation.     /78   Pulse 66   Ht 170.2 cm (67\")   Wt 106 kg (234 lb)   SpO2 96%   BMI 36.65 kg/m²      Physical Exam      Constitutional:  well developed; well nourished  no acute distress  appears stated age   ENT/Thyroid: not examined   Eyes: Conjunctiva: clear   Respiratory:  breathing is unlabored  clear to auscultation bilaterally   Cardiovascular:  regular rate and rhythm   Chest:  Not performed.   Abdomen: Not performed.   : Not performed.   Musculoskeletal: Not performed   Skin: not performed.   Neuro: mental status, speech normal   Psych: mood and affect are within normal limits       Labs/Imaging   Latest Reference Range & Units 12/19/23 11:53   Sodium 136 - 145 mmol/L 138   Potassium 3.5 - 5.2 mmol/L 4.8   Chloride 98 - 107 mmol/L 105   CO2 22.0 - 29.0 mmol/L 20.7 (L)   Anion Gap 5.0 - 15.0 mmol/L 12.3   BUN 8 - 23 mg/dL 17   Creatinine 0.76 - 1.27 mg/dL 0.99   BUN/Creatinine Ratio 7.0 - 25.0  17.2   eGFR >60.0 mL/min/1.73 81.9   Glucose 65 - 99 mg/dL 94   Calcium 8.6 - 10.5 mg/dL 11.1 (H)   Phosphorus 2.5 - 4.5 mg/dL 2.7   Albumin 3.5 - 5.2 g/dL 4.6   PTH Intact (Serial Monitor) 15.0 - 65.0 pg/mL 71.6 (H)   25 Hydroxy, " Vitamin D 30.0 - 100.0 ng/ml 55.3   (L): Data is abnormally low  (H): Data is abnormally high    Diagnoses and all orders for this visit:    1. Hyperparathyroidism (Primary)  -     Vitamin D,25-Hydroxy; Future  -     PTH, Intact; Future  -     Renal Function Panel; Future  Prior labs with PTH mildly elevated in the setting of hypercalcemia and normal vitamin D. 24-hour urine collection was sufficient to rule out FHH.  Again reviewed absolute surgical indications for primary hyperparathyroidism.  At present, patient does not have absolute surgical indication.  We discussed continued monitoring versus referral to surgery for definitive management.  Patient is hesitant to consider surgery given prior postsurgical infection..  In the absence of absolute indication, patient stated preference for monitoring.   He will contact the clinic if something were to change.  We will plan to continue with intermittent monitoring.  Patient will contact the clinic with any clinical changes between visits.  We reviewed the need to maintain adequate hydration as this can trigger a sudden rise in serum calcium.      Return in about 6 months (around 6/27/2024). The patient was instructed to contact the clinic with any interval questions or concerns.    Electronically signed by: Margo Garg MD   Endocrinologist    Dictated Utilizing Dragon Dictation

## 2024-01-01 NOTE — TELEPHONE ENCOUNTER
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 He is requesting a refill on pain medication.  Please advise.    Thanks,     Esthela   Parent

## 2024-03-07 ENCOUNTER — OFFICE VISIT (OUTPATIENT)
Dept: ORTHOPEDIC SURGERY | Facility: CLINIC | Age: 69
End: 2024-03-07
Payer: MEDICARE

## 2024-03-07 VITALS
WEIGHT: 217.4 LBS | BODY MASS INDEX: 34.12 KG/M2 | SYSTOLIC BLOOD PRESSURE: 118 MMHG | DIASTOLIC BLOOD PRESSURE: 74 MMHG | HEIGHT: 67 IN

## 2024-03-07 DIAGNOSIS — Z96.653 STATUS POST BILATERAL KNEE REPLACEMENTS: Primary | ICD-10-CM

## 2024-03-07 RX ORDER — ROSUVASTATIN CALCIUM 5 MG/1
1 TABLET, COATED ORAL DAILY
COMMUNITY
Start: 2024-02-19

## 2024-03-07 NOTE — PROGRESS NOTES
"    [facility] Note        Subjective     CC: Follow-up (6 month f/u -- Status post bilateral total knee arthroplasties. Right TKA ( 2023), Left TKA ( 2021))      IVAN Chung is a 68 y.o. male.  Patient here today status post bilateral TKA.  Right side done 2023 and the left side done 2021.  He is doing very well overall.  Only complaint is that he cannot kneel down on his knees.  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    Overall, patient's symptoms are as above.    ROS:    Constiutional:Pt denies fever, chills, nausea, or vomiting.  MSK:as above        Objective      Past Medical History  Past Medical History:   Diagnosis Date    Arthritis     Elevated cholesterol     Hearing loss     History of COVID-19 2021    blood test showed positive antibodies in 2021 (no symptoms)    History of staph infection     Lost two vertebrae as a result of staph infection    Hypercalcemia     Hypertension     Staph infection     C2-C3    Wears glasses      Social History     Socioeconomic History    Marital status:    Tobacco Use    Smoking status: Never    Smokeless tobacco: Former     Types: Chew     Quit date:    Vaping Use    Vaping status: Never Used   Substance and Sexual Activity    Alcohol use: Yes     Alcohol/week: 5.0 standard drinks of alcohol     Types: 4 Cans of beer, 1 Shots of liquor per week     Comment: SOCIALLY    Drug use: Never    Sexual activity: Defer          Physical Exam  /74   Ht 170.2 cm (67.01\")   Wt 98.6 kg (217 lb 6.4 oz)   BMI 34.04 kg/m²     Body mass index is 34.04 kg/m².    Patient is well nourished and well developed.        Ortho Exam        Lower Extremity:     Inspection and Palpation:      Bilateral knees:  Calf:  Soft and non tender  Effusion:  None  Pulses:  2+  Warmth:  None  Incision:  Healed     ROM:  Right:  Extension:0    Flexion:120  Left:     Extension: 0    Flexion: "  120      Functional Testing:  Bilateral:  Straight Leg Raise: 5/5  Patella Tracking:  Normal          Imaging/Labs/EMG Reviewed:  Imaging Results (Last 24 Hours)       Procedure Component Value Units Date/Time    XR Knee 3 View Bilateral [[id number]] Resulted: 03/07/24 1029     Updated: 03/07/24 1030    Narrative:      Knee X-ray    Indication: status-post TKA    Study:  AP, Lateral, and Sunrise views of Bilateral knee    Comparison: None    Findings:  No signs of acute fracture are visualized  No signs of loosening are appreciated  Components are well aligned    Impression:  Status post Bilateral total knee arthroplasty. No signs of   loosening or fracture.                Assessment    Assessment:  1. Status post bilateral knee replacements        Plan:  Recommend over the counter anti-inflammatories for pain and/or swelling  Status post bilateral TKA--clinically the patient doing quite well overall.  Radiographs are reassuring today.  Continue indefinite antibiotic prophylaxis.  Follow-up in year or sooner if necessary.  He will need x-rays when he returns.      Connor Anderson MD  03/07/24  10:44 EST      Dictated Utilizing Dragon Dictation.

## 2024-06-20 ENCOUNTER — LAB (OUTPATIENT)
Dept: ENDOCRINOLOGY | Facility: CLINIC | Age: 69
End: 2024-06-20
Payer: MEDICARE

## 2024-06-20 DIAGNOSIS — E21.3 HYPERPARATHYROIDISM: ICD-10-CM

## 2024-06-20 PROCEDURE — 83970 ASSAY OF PARATHORMONE: CPT | Performed by: INTERNAL MEDICINE

## 2024-06-20 PROCEDURE — 82306 VITAMIN D 25 HYDROXY: CPT | Performed by: INTERNAL MEDICINE

## 2024-06-20 PROCEDURE — 80069 RENAL FUNCTION PANEL: CPT | Performed by: INTERNAL MEDICINE

## 2024-06-20 PROCEDURE — 36415 COLL VENOUS BLD VENIPUNCTURE: CPT | Performed by: INTERNAL MEDICINE

## 2024-06-21 LAB
25(OH)D3 SERPL-MCNC: 43.6 NG/ML (ref 30–100)
ALBUMIN SERPL-MCNC: 4.5 G/DL (ref 3.5–5.2)
ANION GAP SERPL CALCULATED.3IONS-SCNC: 10.2 MMOL/L (ref 5–15)
BUN SERPL-MCNC: 16 MG/DL (ref 8–23)
BUN/CREAT SERPL: 19.3 (ref 7–25)
CALCIUM SPEC-SCNC: 11.7 MG/DL (ref 8.6–10.5)
CHLORIDE SERPL-SCNC: 103 MMOL/L (ref 98–107)
CO2 SERPL-SCNC: 22.8 MMOL/L (ref 22–29)
CREAT SERPL-MCNC: 0.83 MG/DL (ref 0.76–1.27)
EGFRCR SERPLBLD CKD-EPI 2021: 93.6 ML/MIN/1.73
GLUCOSE SERPL-MCNC: 82 MG/DL (ref 65–99)
PHOSPHATE SERPL-MCNC: 2.6 MG/DL (ref 2.5–4.5)
POTASSIUM SERPL-SCNC: 4.5 MMOL/L (ref 3.5–5.2)
PTH-INTACT SERPL-MCNC: 58.9 PG/ML (ref 15–65)
SODIUM SERPL-SCNC: 136 MMOL/L (ref 136–145)

## 2024-06-28 ENCOUNTER — OFFICE VISIT (OUTPATIENT)
Dept: ENDOCRINOLOGY | Facility: CLINIC | Age: 69
End: 2024-06-28
Payer: MEDICARE

## 2024-06-28 VITALS
DIASTOLIC BLOOD PRESSURE: 74 MMHG | WEIGHT: 209 LBS | OXYGEN SATURATION: 98 % | BODY MASS INDEX: 32.8 KG/M2 | SYSTOLIC BLOOD PRESSURE: 120 MMHG | HEIGHT: 67 IN | HEART RATE: 60 BPM

## 2024-06-28 DIAGNOSIS — R73.09 ELEVATED HEMOGLOBIN A1C: ICD-10-CM

## 2024-06-28 DIAGNOSIS — R63.5 WEIGHT GAIN: ICD-10-CM

## 2024-06-28 DIAGNOSIS — E21.3 HYPERPARATHYROIDISM: Primary | ICD-10-CM

## 2024-06-28 PROCEDURE — 3078F DIAST BP <80 MM HG: CPT | Performed by: INTERNAL MEDICINE

## 2024-06-28 PROCEDURE — 99214 OFFICE O/P EST MOD 30 MIN: CPT | Performed by: INTERNAL MEDICINE

## 2024-06-28 PROCEDURE — 3074F SYST BP LT 130 MM HG: CPT | Performed by: INTERNAL MEDICINE

## 2024-06-28 RX ORDER — CINACALCET 30 MG/1
30 TABLET, FILM COATED ORAL DAILY
Qty: 90 TABLET | Refills: 1 | Status: SHIPPED | OUTPATIENT
Start: 2024-06-28

## 2024-06-28 NOTE — PROGRESS NOTES
"Chief Complaint   Patient presents with    Hyperparathyroidism        HPI   Ashok Chung is a 69 y.o. male had concerns including Hyperparathyroidism.      Patient reports that he is having a gout flare.  He does report that discomfort has improved over the past 24 hours.  He is also noted that constipation has increased somewhat in the last few months.  He inquires whether the parathyroid gland could make it more difficult to lose weight.  He does report he is being treated with a prolonged course of antibiotics due to ongoing issues with staph infection.    The following portions of the patient's history were reviewed and updated as appropriate: allergies, current medications, and past social history.    Review of Systems   Constitutional:  Negative for unexpected weight gain.   Gastrointestinal:  Positive for constipation.        /74 (BP Location: Left arm, Patient Position: Sitting, Cuff Size: Adult)   Pulse 60   Ht 170.2 cm (67\")   Wt 94.8 kg (209 lb)   SpO2 98%   BMI 32.73 kg/m²      Physical Exam      Constitutional:  well developed; well nourished  no acute distress   ENT/Thyroid: not examined   Eyes: Conjunctiva: clear   Respiratory:  breathing is unlabored  clear to auscultation bilaterally   Cardiovascular:  regular rate and rhythm   Chest:  Not performed.   Abdomen: Not performed.   : Not performed.   Musculoskeletal: Not performed   Skin: not performed.   Neuro: mental status, speech normal   Psych: mood and affect are within normal limits       Labs/Imaging   Latest Reference Range & Units 06/20/24 11:25   Sodium 136 - 145 mmol/L 136   Potassium 3.5 - 5.2 mmol/L 4.5   Chloride 98 - 107 mmol/L 103   CO2 22.0 - 29.0 mmol/L 22.8   Anion Gap 5.0 - 15.0 mmol/L 10.2   BUN 8 - 23 mg/dL 16   Creatinine 0.76 - 1.27 mg/dL 0.83   BUN/Creatinine Ratio 7.0 - 25.0  19.3   eGFR >60.0 mL/min/1.73 93.6   Glucose 65 - 99 mg/dL 82   Calcium 8.6 - 10.5 mg/dL 11.7 (H)   Phosphorus 2.5 - 4.5 mg/dL 2.6 " "  Albumin 3.5 - 5.2 g/dL 4.5   PTH Intact (Serial Monitor) 15.0 - 65.0 pg/mL 58.9   25 Hydroxy, Vitamin D 30.0 - 100.0 ng/ml 43.6   (H): Data is abnormally high     Latest Reference Range & Units 09/16/21 10:10   Calcium, 24H Urine 100.0 - 300.0 mg/24 hr 347.6 (H)   (H): Data is abnormally high    Diagnoses and all orders for this visit:    1. Hyperparathyroidism (Primary)  -     Renal Function Panel; Future  Prior laboratory evaluation consistent with primary hyperparathyroidism.  24-hour urine calcium of 347.6 in 2021 is sufficient to rule out FHH.  Patient previously has not had any absolute indication for surgery, however, most recent calcium level was more than 1 point above normal.  Patient remains hesitant to proceed to surgery given history of postsurgical wound infections.  He reports that he might be willing to consider this after completion of prolonged course of antibiotics.  We did discuss using medication to lower serum calcium.  We reviewed this is not a curative measure for hyperparathyroidism.  We reviewed potential adverse effects of Cinacalcet.  Plan to start Cinacalcet 30 mg daily.  Patient will repeat in approximately 1 month for possible dose adjustment.    2. Weight gain  -     TSH; Future  Check TSH    3. Elevated hemoglobin A1c  Hemoglobin A1c was 5.7 In 2023.  Patient does report that his PCP has been monitoring this and he has been told values are \"borderline \".  We discussed potential impact of weight loss on glycemic control.    Other orders  -     cinacalcet (Sensipar) 30 MG tablet; Take 1 tablet by mouth Daily.  Dispense: 90 tablet; Refill: 1         Return in about 6 months (around 12/28/2024) for Next scheduled follow up. The patient was instructed to contact the clinic with any interval questions or concerns.    Electronically signed by: Margo Garg MD   Endocrinologist    Dictated Utilizing Dragon Dictation    "

## 2024-12-20 ENCOUNTER — LAB (OUTPATIENT)
Dept: ENDOCRINOLOGY | Facility: CLINIC | Age: 69
End: 2024-12-20
Payer: MEDICARE

## 2024-12-20 DIAGNOSIS — R63.5 WEIGHT GAIN: ICD-10-CM

## 2024-12-20 DIAGNOSIS — E21.3 HYPERPARATHYROIDISM: ICD-10-CM

## 2024-12-20 LAB
ALBUMIN SERPL-MCNC: 4.3 G/DL (ref 3.5–5.2)
ANION GAP SERPL CALCULATED.3IONS-SCNC: 10.1 MMOL/L (ref 5–15)
BUN SERPL-MCNC: 21 MG/DL (ref 8–23)
BUN/CREAT SERPL: 20.2 (ref 7–25)
CALCIUM SPEC-SCNC: 10.4 MG/DL (ref 8.6–10.5)
CHLORIDE SERPL-SCNC: 108 MMOL/L (ref 98–107)
CO2 SERPL-SCNC: 25.9 MMOL/L (ref 22–29)
CREAT SERPL-MCNC: 1.04 MG/DL (ref 0.76–1.27)
EGFRCR SERPLBLD CKD-EPI 2021: 76.8 ML/MIN/1.73
GLUCOSE SERPL-MCNC: 97 MG/DL (ref 65–99)
PHOSPHATE SERPL-MCNC: 2.6 MG/DL (ref 2.5–4.5)
POTASSIUM SERPL-SCNC: 4.9 MMOL/L (ref 3.5–5.2)
SODIUM SERPL-SCNC: 144 MMOL/L (ref 136–145)
TSH SERPL DL<=0.05 MIU/L-ACNC: 1.96 UIU/ML (ref 0.27–4.2)

## 2024-12-20 PROCEDURE — 80069 RENAL FUNCTION PANEL: CPT | Performed by: INTERNAL MEDICINE

## 2024-12-20 PROCEDURE — 36415 COLL VENOUS BLD VENIPUNCTURE: CPT | Performed by: INTERNAL MEDICINE

## 2024-12-20 PROCEDURE — 84443 ASSAY THYROID STIM HORMONE: CPT | Performed by: INTERNAL MEDICINE

## 2024-12-27 ENCOUNTER — OFFICE VISIT (OUTPATIENT)
Dept: ENDOCRINOLOGY | Facility: CLINIC | Age: 69
End: 2024-12-27
Payer: MEDICARE

## 2024-12-27 VITALS
HEART RATE: 51 BPM | HEIGHT: 67 IN | DIASTOLIC BLOOD PRESSURE: 70 MMHG | SYSTOLIC BLOOD PRESSURE: 122 MMHG | OXYGEN SATURATION: 98 % | BODY MASS INDEX: 33.46 KG/M2 | WEIGHT: 213.2 LBS

## 2024-12-27 DIAGNOSIS — E21.3 HYPERPARATHYROIDISM: Primary | ICD-10-CM

## 2024-12-27 DIAGNOSIS — E55.9 VITAMIN D DEFICIENCY: ICD-10-CM

## 2024-12-27 PROCEDURE — 3074F SYST BP LT 130 MM HG: CPT | Performed by: INTERNAL MEDICINE

## 2024-12-27 PROCEDURE — 99214 OFFICE O/P EST MOD 30 MIN: CPT | Performed by: INTERNAL MEDICINE

## 2024-12-27 PROCEDURE — 3078F DIAST BP <80 MM HG: CPT | Performed by: INTERNAL MEDICINE

## 2024-12-27 RX ORDER — CINACALCET 30 MG/1
30 TABLET, FILM COATED ORAL DAILY
Qty: 90 TABLET | Refills: 1 | Status: SHIPPED | OUTPATIENT
Start: 2024-12-27 | End: 2024-12-27 | Stop reason: SDUPTHER

## 2024-12-27 RX ORDER — VITAMIN K2 90 MCG
1000 CAPSULE ORAL DAILY
Start: 2024-12-27

## 2024-12-27 RX ORDER — CINACALCET 30 MG/1
30 TABLET, FILM COATED ORAL DAILY
Qty: 90 TABLET | Refills: 1 | Status: SHIPPED | OUTPATIENT
Start: 2024-12-27

## 2024-12-27 NOTE — PROGRESS NOTES
"Chief Complaint   Patient presents with    Hyperparathyroidism        HPI   Ashok Chung is a 69 y.o. male had concerns including Hyperparathyroidism.      Patient started on Cinacalcet 30 mg daily following last visit.  He reports some ongoing constipation which is not changed in comparison to prior.  He otherwise feels well. He completed labs prior to appointment which were reviewed.  He is not currently taking vitamin D supplementation.    The following portions of the patient's history were reviewed and updated as appropriate: allergies, current medications, and past social history.    Review of Systems   Gastrointestinal:  Positive for constipation.      /70 (BP Location: Right arm, Patient Position: Sitting, Cuff Size: Adult)   Pulse 51   Ht 170.2 cm (67\")   Wt 96.7 kg (213 lb 3.2 oz)   SpO2 98%   BMI 33.39 kg/m²      Physical Exam      Constitutional:  well developed; well nourished  no acute distress  appears stated age   ENT/Thyroid: not examined   Eyes: Conjunctiva: clear   Respiratory:  breathing is unlabored  clear to auscultation bilaterally   Cardiovascular:  regular rate and rhythm   Chest:  Not performed.   Abdomen: Not performed.   : Not performed.   Musculoskeletal: Not performed   Skin: not performed.   Neuro: mental status, speech normal   Psych: mood and affect are within normal limits       Labs/Imaging   Latest Reference Range & Units 12/20/24 10:16   Sodium 136 - 145 mmol/L 144   Potassium 3.5 - 5.2 mmol/L 4.9   Chloride 98 - 107 mmol/L 108 (H)   CO2 22.0 - 29.0 mmol/L 25.9   Anion Gap 5.0 - 15.0 mmol/L 10.1   BUN 8 - 23 mg/dL 21   Creatinine 0.76 - 1.27 mg/dL 1.04   BUN/Creatinine Ratio 7.0 - 25.0  20.2   eGFR >60.0 mL/min/1.73 76.8   Glucose 65 - 99 mg/dL 97   Calcium 8.6 - 10.5 mg/dL 10.4   Phosphorus 2.5 - 4.5 mg/dL 2.6   Albumin 3.5 - 5.2 g/dL 4.3   TSH Baseline 0.270 - 4.200 uIU/mL 1.960   (H): Data is abnormally high    Diagnoses and all orders for this visit:    1. " Hyperparathyroidism (Primary)  -     Vitamin D,25-Hydroxy; Future  -     PTH, Intact; Future  -     Renal Function Panel; Future  Prior laboratory evaluation consistent with primary hyperparathyroidism. 24-hour urine calcium of 347.6 in 2021 is sufficient to rule out FHH.   Patient remains hesitant to proceed to surgery given history of postsurgical wound infections.  Patient started on Cinacalcet 30 mg daily following last visit.  Recent labs with normal calcium level, 10.4.  Patient denies adverse effects.  We reviewed this is not a curative measure for hyperparathyroidism.  Patient voiced understanding.    2. Vitamin D deficiency  Patient reports that his PCP had him discontinue his supplement in the interim since last visit.  Most recent value was 43.6 in June 2024.  At that time, patient was taking 2000 international units daily.  Discussed options regarding supplementation, following discussion, patient elected to resume vitamin D at a dose of 1000 international units daily.  Will plan to repeat labs at next visit.    Other orders  -     cinacalcet (Sensipar) 30 MG tablet; Take 1 tablet by mouth Daily.  Dispense: 90 tablet; Refill: 1  -     Cholecalciferol (Vitamin D3) 1000 units capsule; Take 1,000 Units by mouth Daily.    Orders for labs were placed to be completed prior to next visit per patient request.    Return in about 6 months (around 6/27/2025). The patient was instructed to contact the clinic with any interval questions or concerns.    Electronically signed by: Margo Garg MD   Endocrinologist    Dictated Utilizing Dragon Dictation

## 2025-03-11 ENCOUNTER — OFFICE VISIT (OUTPATIENT)
Dept: ORTHOPEDIC SURGERY | Facility: CLINIC | Age: 70
End: 2025-03-11
Payer: MEDICARE

## 2025-03-11 VITALS — WEIGHT: 210 LBS | BODY MASS INDEX: 32.96 KG/M2 | HEIGHT: 67 IN

## 2025-03-11 DIAGNOSIS — Z96.653 STATUS POST BILATERAL KNEE REPLACEMENTS: Primary | ICD-10-CM

## 2025-03-11 ASSESSMENT — KOOS JR
KOOS JR SCORE: 84.6
KOOS JR SCORE: 2

## 2025-03-11 NOTE — PROGRESS NOTES
"    [facility] [place]        Subjective     CC: Follow-up (Status post bilateral total knee arthroplasties:/Right TKA (2/22/23), Left TKA ( 11/17/21))      IVAN Chung is a 69 y.o. male.  Patient is here today for follow-up of his bilateral TKAs.  Left side replaced in November 2021 and right side replaced in February 2023.  He is doing well overall.  No major complaints.  Started riding exercise bike 2 weeks ago and has had some lateral sided knee pain on the right but nothing severe he tells me.    Overall, patient's symptoms are as above    ROS:    Constiutional:Pt denies fever, chills, nausea, or vomiting.  MSK:as above        Objective      Past Medical History  Past Medical History:   Diagnosis Date    Arthritis     CTS (carpal tunnel syndrome) 2011    Elevated cholesterol     Hearing loss     History of COVID-19 02/2021    blood test showed positive antibodies in Feb 2021 (no symptoms)    History of staph infection 2004    Lost two vertebrae as a result of staph infection    Hypercalcemia     Hypertension     Staph infection 2000    C2-C3    Wears glasses      Social History     Socioeconomic History    Marital status:    Tobacco Use    Smoking status: Never    Smokeless tobacco: Former     Types: Chew     Quit date: 1986    Tobacco comments:     No chew for over 40 years   Vaping Use    Vaping status: Never Used   Substance and Sexual Activity    Alcohol use: Yes     Alcohol/week: 5.0 standard drinks of alcohol     Types: 4 Cans of beer, 1 Shots of liquor per week     Comment: SOCIALLY    Drug use: Never    Sexual activity: Yes     Partners: Female          Physical Exam  Ht 170.2 cm (67\")   Wt 95.3 kg (210 lb)   BMI 32.89 kg/m²     Body mass index is 32.89 kg/m².    Patient is well nourished and well developed.        Ortho Exam        Lower Extremity:     Inspection and Palpation:      Bilateral knees:  Calf:  Soft and non tender  Effusion:  None  Pulses:  2+  Warmth:  " None  Incision:  Healed     ROM:  Right:  Extension:0    Flexion:120  Left:     Extension: 0    Flexion:  120      Functional Testing:  Bilateral:  Straight Leg Raise: 5/5  Patella Tracking:  Normal          Imaging/Labs/EMG Reviewed and Interpreted:  Imaging Results (Last 24 Hours)       Procedure Component Value Units Date/Time    XR Knee 3 View Bilateral [[id number]] Resulted: 03/11/25 1009     Updated: 03/11/25 1009    Narrative:      Knee X-ray    Indication: status-post TKA    Study:  AP, Lateral, and Sunrise views of Bilateral knee    Comparison: Bilateral knee 3/7/2024    Findings:  No signs of acute fracture are visualized  No signs of loosening are appreciated  Components are well aligned    Impression:  Status post Bilateral cemented total knee arthroplasty. No   signs of loosening or fracture.                Assessment    Assessment:  1. Status post bilateral knee replacements        Plan:  Recommend over the counter anti-inflammatories for pain and/or swelling  Status post bilateral TKA--patient is doing very well overall.  I told him to keep an eye on his lateral sided right knee pain but he should continue working out being as active as possible.  Follow-up in year with x-rays or sooner if necessary.  Continue indefinite antibiotic prophylaxis.      Connor Anderson MD  03/11/25  10:13 EDT      Dictated Utilizing Dragon Dictation.

## 2025-05-28 ENCOUNTER — TELEPHONE (OUTPATIENT)
Dept: ENDOCRINOLOGY | Facility: CLINIC | Age: 70
End: 2025-05-28

## 2025-05-28 NOTE — TELEPHONE ENCOUNTER
The lab orders placed in December are what will be required for appointment.  He can do these prior to visit if he would like to have them available for discussion at visit or following visit, which ever is his preference.

## 2025-05-28 NOTE — TELEPHONE ENCOUNTER
"    Caller: Ashok Chung \"Chaz\"    Relationship to Patient: Self    Phone Number:     [phone]       Reason for Call: THE PT WOULD LIKE TO KNOW IF HE NEEDS TO GET LABS BEFORE HIS NEXT APPOINTMENT. PLEASE ADVISE    "

## 2025-05-29 ENCOUNTER — LAB (OUTPATIENT)
Dept: ENDOCRINOLOGY | Facility: CLINIC | Age: 70
End: 2025-05-29
Payer: MEDICARE

## 2025-05-29 DIAGNOSIS — E21.3 HYPERPARATHYROIDISM: ICD-10-CM

## 2025-05-29 LAB
25(OH)D3 SERPL-MCNC: 45.8 NG/ML (ref 30–100)
ALBUMIN SERPL-MCNC: 4.6 G/DL (ref 3.5–5.2)
ANION GAP SERPL CALCULATED.3IONS-SCNC: 9.6 MMOL/L (ref 5–15)
BUN SERPL-MCNC: 17 MG/DL (ref 8–23)
BUN/CREAT SERPL: 16.5 (ref 7–25)
CALCIUM SPEC-SCNC: 11 MG/DL (ref 8.6–10.5)
CHLORIDE SERPL-SCNC: 100 MMOL/L (ref 98–107)
CO2 SERPL-SCNC: 27.4 MMOL/L (ref 22–29)
CREAT SERPL-MCNC: 1.03 MG/DL (ref 0.76–1.27)
EGFRCR SERPLBLD CKD-EPI 2021: 77.7 ML/MIN/1.73
GLUCOSE SERPL-MCNC: 95 MG/DL (ref 65–99)
PHOSPHATE SERPL-MCNC: 2.5 MG/DL (ref 2.5–4.5)
POTASSIUM SERPL-SCNC: 4.5 MMOL/L (ref 3.5–5.2)
PTH-INTACT SERPL-MCNC: 59.6 PG/ML (ref 15–65)
SODIUM SERPL-SCNC: 137 MMOL/L (ref 136–145)

## 2025-05-29 PROCEDURE — 80069 RENAL FUNCTION PANEL: CPT | Performed by: INTERNAL MEDICINE

## 2025-05-29 PROCEDURE — 82306 VITAMIN D 25 HYDROXY: CPT | Performed by: INTERNAL MEDICINE

## 2025-05-29 PROCEDURE — 36415 COLL VENOUS BLD VENIPUNCTURE: CPT | Performed by: INTERNAL MEDICINE

## 2025-05-29 PROCEDURE — 83970 ASSAY OF PARATHORMONE: CPT | Performed by: INTERNAL MEDICINE

## 2025-06-05 ENCOUNTER — OFFICE VISIT (OUTPATIENT)
Dept: ENDOCRINOLOGY | Facility: CLINIC | Age: 70
End: 2025-06-05
Payer: MEDICARE

## 2025-06-05 VITALS
SYSTOLIC BLOOD PRESSURE: 124 MMHG | HEIGHT: 67 IN | DIASTOLIC BLOOD PRESSURE: 80 MMHG | HEART RATE: 61 BPM | OXYGEN SATURATION: 99 % | WEIGHT: 207 LBS | BODY MASS INDEX: 32.49 KG/M2

## 2025-06-05 DIAGNOSIS — E55.9 VITAMIN D DEFICIENCY: ICD-10-CM

## 2025-06-05 DIAGNOSIS — E21.3 HYPERPARATHYROIDISM: Primary | ICD-10-CM

## 2025-06-05 PROCEDURE — 3074F SYST BP LT 130 MM HG: CPT | Performed by: INTERNAL MEDICINE

## 2025-06-05 PROCEDURE — 3079F DIAST BP 80-89 MM HG: CPT | Performed by: INTERNAL MEDICINE

## 2025-06-05 PROCEDURE — 99214 OFFICE O/P EST MOD 30 MIN: CPT | Performed by: INTERNAL MEDICINE

## 2025-06-05 RX ORDER — CINACALCET 30 MG/1
30 TABLET, FILM COATED ORAL 2 TIMES DAILY
Qty: 180 TABLET | Refills: 1 | Status: SHIPPED | OUTPATIENT
Start: 2025-06-05

## 2025-06-05 NOTE — PROGRESS NOTES
"Chief Complaint   Patient presents with    Hyperparathyroidism        HPI   Ashok Chung is a 70 y.o. male had concerns including Hyperparathyroidism.      Patient presents for follow-up.  He is currently taking Cinacalcet 30 mg daily.  He completed labs prior to appointment which were reviewed.  He reports some constipation, no other new concerns.    The following portions of the patient's history were reviewed and updated as appropriate: allergies, current medications, and past social history.    Review of Systems   Gastrointestinal:  Positive for constipation.        /80   Pulse 61   Ht 170.2 cm (67.01\")   Wt 93.9 kg (207 lb)   SpO2 99%   BMI 32.41 kg/m²      Physical Exam      Constitutional:  well developed; well nourished  no acute distress  appears stated age   ENT/Thyroid: not examined   Eyes: Conjunctiva: clear   Respiratory:  breathing is unlabored  clear to auscultation bilaterally   Cardiovascular:  regular rate and rhythm   Chest:  Not performed.   Abdomen: Not performed.   : Not performed.   Musculoskeletal: Not performed   Skin: not performed.   Neuro: mental status, speech normal   Psych: mood and affect are within normal limits       Labs/Imaging   Latest Reference Range & Units 05/29/25 09:33   Sodium 136 - 145 mmol/L 137   Potassium 3.5 - 5.2 mmol/L 4.5   Chloride 98 - 107 mmol/L 100   CO2 22.0 - 29.0 mmol/L 27.4   Anion Gap 5.0 - 15.0 mmol/L 9.6   BUN 8.0 - 23.0 mg/dL 17.0   Creatinine 0.76 - 1.27 mg/dL 1.03   BUN/Creatinine Ratio 7.0 - 25.0  16.5   eGFR >60.0 mL/min/1.73 77.7   Glucose 65 - 99 mg/dL 95   Calcium 8.6 - 10.5 mg/dL 11.0 (H)   Phosphorus 2.5 - 4.5 mg/dL 2.5   Albumin 3.5 - 5.2 g/dL 4.6   PTH Intact (Serial Monitor) 15.0 - 65.0 pg/mL 59.6   25 Hydroxy, Vitamin D 30.0 - 100.0 ng/ml 45.8   (H): Data is abnormally high    Diagnoses and all orders for this visit:    1. Hyperparathyroidism (Primary)  Prior laboratory evaluation consistent with primary hyperparathyroidism. " 24-hour urine calcium of 347.6 in 2021 is sufficient to rule out FHH.   Patient remains hesitant to proceed to surgery given history of postsurgical wound infections.  Patient completed labs prior to appointment which were reviewed.  Calcium 11, plan to increase Cinacalcet to 30 mg twice daily and repeat labs in 4 to 8 weeks.  Orders were placed for this.  Patient will contact the clinic in the interim between visits with any concerning changes.  He would prefer to complete labs prior to appointment.     2. Vitamin D deficiency  Recent vitamin D level patient will continue current supplement.    Other orders  -     cinacalcet (Sensipar) 30 MG tablet; Take 1 tablet by mouth 2 (Two) Times a Day.  Dispense: 180 tablet; Refill: 1       Return in about 6 months (around 12/5/2025). The patient was instructed to contact the clinic with any interval questions or concerns.    Electronically signed by: Margo Garg MD   Endocrinologist    Dictated Utilizing Dragon Dictation

## (undated) DEVICE — PATIENT RETURN ELECTRODE, SINGLE-USE, CONTACT QUALITY MONITORING, ADULT, WITH 9FT CORD, FOR PATIENTS WEIGING OVER 33LBS. (15KG): Brand: MEGADYNE

## (undated) DEVICE — Device

## (undated) DEVICE — DISPOSABLE TOURNIQUET CUFF SINGLE BLADDER, DUAL PORT AND QUICK CONNECT CONNECTOR: Brand: COLOR CUFF

## (undated) DEVICE — TRAP FLD MINIVAC MEGADYNE 100ML

## (undated) DEVICE — ANTIBACTERIAL UNDYED BRAIDED (POLYGLACTIN 910), SYNTHETIC ABSORBABLE SUTURE: Brand: COATED VICRYL

## (undated) DEVICE — 3 BONE CEMENT MIXER: Brand: MIXEVAC

## (undated) DEVICE — SCRW HEX PERSONA FML 2.5X25MM PK/2
Type: IMPLANTABLE DEVICE | Site: KNEE | Status: NON-FUNCTIONAL
Removed: 2021-11-17

## (undated) DEVICE — SYS CLS SKIN PREMIERPRO EXOFINFUSION 22CM

## (undated) DEVICE — STRYKER PERFORMANCE SERIES SAGITTAL BLADE: Brand: STRYKER PERFORMANCE SERIES

## (undated) DEVICE — DRSNG PAD ABD 8X10IN STRL

## (undated) DEVICE — PENCL ROCKRSWCH MEGADYNE W/HOLSTR 10FT SS

## (undated) DEVICE — PUMP PAIN AUTOFUSER AUTO SELCT NOBOLUS 1TO14ML/HR 550ML DISP

## (undated) DEVICE — GLV SURG PREMIERPRO MIC LTX PF SZ8 BRN

## (undated) DEVICE — ELECTRD BLD EZ CLN STD 2.5IN

## (undated) DEVICE — 3M™ STERI-DRAPE™ INCISE DRAPE 1050 (60CM X 45CM): Brand: STERI-DRAPE™

## (undated) DEVICE — UNDERCAST PADDING: Brand: DEROYAL

## (undated) DEVICE — BLANKT WARM UPPR/BDY ARM/OUT 57X196CM

## (undated) DEVICE — TBG PENCL TELESCP MEGADYNE SMOKE EVAC 10FT

## (undated) DEVICE — TRY EPID SFTY 18G 3.5IN 1T7680

## (undated) DEVICE — KT PUMP INFUBLOCK MDL 2100 PMKITSOLIS

## (undated) DEVICE — BNDG ELAS W/CLIP 6IN 10YD LF STRL

## (undated) DEVICE — STERILE PVP: Brand: MEDLINE INDUSTRIES, INC.

## (undated) DEVICE — PK KN TOTL 10

## (undated) DEVICE — SUT MONOCRYL PLS ANTIB UND 3/0  PS1 27IN

## (undated) DEVICE — NDL HYPO ECLPS SFTY 18G 1 1/2IN

## (undated) DEVICE — PIN DRL NOHEAD TROC 3.2X75MM

## (undated) DEVICE — SYR LUERLOK 30CC

## (undated) DEVICE — CONTAINER,SPECIMEN,OR STERILE,4OZ: Brand: MEDLINE

## (undated) DEVICE — 450 ML BOTTLE OF 0.05% CHLORHEXIDINE GLUCONATE IN 99.95% STERILE WATER FOR IRRIGATION, USP AND APPLICATOR.: Brand: IRRISEPT ANTIMICROBIAL WOUND LAVAGE

## (undated) DEVICE — PAD ARMBRD SURG CONVOL 7.5X20X2IN

## (undated) DEVICE — TB SXN FRAZIER 12F STRL

## (undated) DEVICE — SCRW HEX PERSONA FML 2.5X25MM PK/2
Type: IMPLANTABLE DEVICE | Site: KNEE | Status: NON-FUNCTIONAL
Removed: 2023-02-22